# Patient Record
Sex: MALE | Race: WHITE | NOT HISPANIC OR LATINO | URBAN - NONMETROPOLITAN AREA
[De-identification: names, ages, dates, MRNs, and addresses within clinical notes are randomized per-mention and may not be internally consistent; named-entity substitution may affect disease eponyms.]

---

## 2021-09-16 ENCOUNTER — LAB OUTSIDE AN ENCOUNTER (OUTPATIENT)
Dept: URBAN - NONMETROPOLITAN AREA CLINIC 2 | Facility: CLINIC | Age: 71
End: 2021-09-16

## 2021-09-16 ENCOUNTER — WEB ENCOUNTER (OUTPATIENT)
Dept: URBAN - NONMETROPOLITAN AREA CLINIC 2 | Facility: CLINIC | Age: 71
End: 2021-09-16

## 2021-09-16 ENCOUNTER — TELEPHONE ENCOUNTER (OUTPATIENT)
Dept: URBAN - METROPOLITAN AREA CLINIC 92 | Facility: CLINIC | Age: 71
End: 2021-09-16

## 2021-09-16 ENCOUNTER — OFFICE VISIT (OUTPATIENT)
Dept: URBAN - NONMETROPOLITAN AREA CLINIC 2 | Facility: CLINIC | Age: 71
End: 2021-09-16
Payer: MEDICARE

## 2021-09-16 VITALS
HEART RATE: 59 BPM | HEIGHT: 73 IN | SYSTOLIC BLOOD PRESSURE: 190 MMHG | TEMPERATURE: 97.3 F | BODY MASS INDEX: 24.92 KG/M2 | DIASTOLIC BLOOD PRESSURE: 94 MMHG | WEIGHT: 188 LBS

## 2021-09-16 DIAGNOSIS — K51.018 ULCERATIVE PANCOLITIS WITH OTHER COMPLICATION: ICD-10-CM

## 2021-09-16 DIAGNOSIS — K83.01 PSC (PRIMARY SCLEROSING CHOLANGITIS): ICD-10-CM

## 2021-09-16 DIAGNOSIS — Z12.11 COLON CANCER SCREENING: ICD-10-CM

## 2021-09-16 DIAGNOSIS — R19.7 DIARRHEA, UNSPECIFIED TYPE: ICD-10-CM

## 2021-09-16 PROCEDURE — 99205 OFFICE O/P NEW HI 60 MIN: CPT | Performed by: NURSE PRACTITIONER

## 2021-09-16 PROCEDURE — 99245 OFF/OP CONSLTJ NEW/EST HI 55: CPT | Performed by: NURSE PRACTITIONER

## 2021-09-16 RX ORDER — PROPRANOLOL HYDROCHLORIDE 40 MG/1
AS DIRECTED TABLET ORAL
Status: ACTIVE | COMMUNITY

## 2021-09-16 RX ORDER — BUDESONIDE 3 MG/1
3 CAP PO DAILY CAPSULE, COATED PELLETS ORAL ONCE A DAY
Qty: 90 CAPSULE | Refills: 1 | OUTPATIENT
Start: 2021-09-16

## 2021-09-16 RX ORDER — MESALAMINE 800 MG/1
AS DIRECTED TABLET, DELAYED RELEASE ORAL
Status: ACTIVE | COMMUNITY

## 2021-09-16 NOTE — HPI-TODAY'S VISIT:
Mr. Pavon is a 71-year-old male who is referred by Dr. Justin Hinson for consultation of ulcerative colitis, PSC, and diarrhea.  A copy of this note will be sent to the referring physician.  He states all of his issues began in January 2020.  He developed a viral upper respiratory disease which was likely Covid at the time but not diagnosed.  He has had a downhill drop of his health since.  Over the course of the following months he develops gastrointestinal distress.  He underwent cholecystectomy at [facility] in August 2020 with no relief.  He then had a syncopal episode later that fall and was found to be profoundly anemic.  He had an EGD and colonoscopy by Dr. Becerril and was diagnosed with ulcerative colitis in November 2020.  He was treated with steroids, mesalamine, and Humira.  He has been on and off of prednisone since.  He states so long as he is on prednisone he has 1-3 formed bowel movements daily, however anytime he comes off he goes up to 30 bowel movements daily.  His last flare started 6 to 8 weeks ago, he called Dr. Becerril's office and was placed on 20 mg of prednisone with a slow taper.  He is down to 10 mg daily and having 3-4 urgent bowel movements with some incontinence.  He does report mucus but no bleeding, he does occasionally have nocturnal diarrhea.  This spring he continued to have elevated liver enzymes, he underwent an evaluation for questionable IPMN versus PSC.  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  He was placed on azathioprine along with ursodiol.  Since this summer he continues to struggle with his GI complaints.  He is on Florastor daily.  He is taking mesalamine 2 capsules 3 times daily.  He is on Humira every other week.  He has seen no improvement in his symptoms since starting azathioprine this spring.  Unfortunately we have none of Dr. Becerril's records including his EGD, colonoscopy, pathology, liver biopsy, or imaging.  He is also undergoing neurologic work-up for tremor, however looking back this all seemed to start post viral, and has worsened with steroids.  His blood sugars have been labile along with his blood pressure.  Today he is not doing well and is here for a second opinion.  MB

## 2021-09-20 ENCOUNTER — OFFICE VISIT (OUTPATIENT)
Dept: URBAN - NONMETROPOLITAN AREA SURGERY CENTER 1 | Facility: SURGERY CENTER | Age: 71
End: 2021-09-20
Payer: MEDICARE

## 2021-09-20 ENCOUNTER — CLAIMS CREATED FROM THE CLAIM WINDOW (OUTPATIENT)
Dept: URBAN - METROPOLITAN AREA CLINIC 4 | Facility: CLINIC | Age: 71
End: 2021-09-20
Payer: MEDICARE

## 2021-09-20 DIAGNOSIS — K51.919 ULCERATIVE COLITIS, UNSPECIFIED WITH UNSPECIFIED COMPLICATIONS: ICD-10-CM

## 2021-09-20 DIAGNOSIS — K51.011 CHRONIC ULCERATIVE ENTEROCOLITIS WITH RECTAL BLEEDING: ICD-10-CM

## 2021-09-20 PROCEDURE — 88305 TISSUE EXAM BY PATHOLOGIST: CPT | Performed by: PATHOLOGY

## 2021-09-20 PROCEDURE — 88341 IMHCHEM/IMCYTCHM EA ADD ANTB: CPT | Performed by: PATHOLOGY

## 2021-09-20 PROCEDURE — G8907 PT DOC NO EVENTS ON DISCHARG: HCPCS | Performed by: INTERNAL MEDICINE

## 2021-09-20 PROCEDURE — 45380 COLONOSCOPY AND BIOPSY: CPT | Performed by: INTERNAL MEDICINE

## 2021-09-20 PROCEDURE — 88342 IMHCHEM/IMCYTCHM 1ST ANTB: CPT | Performed by: PATHOLOGY

## 2021-09-20 RX ORDER — MESALAMINE 800 MG/1
AS DIRECTED TABLET, DELAYED RELEASE ORAL
Status: ACTIVE | COMMUNITY

## 2021-09-20 RX ORDER — PROPRANOLOL HYDROCHLORIDE 40 MG/1
AS DIRECTED TABLET ORAL
Status: ACTIVE | COMMUNITY

## 2021-09-20 RX ORDER — BUDESONIDE 3 MG/1
3 CAP PO DAILY CAPSULE, COATED PELLETS ORAL ONCE A DAY
Qty: 90 CAPSULE | Refills: 1 | Status: ACTIVE | COMMUNITY
Start: 2021-09-16

## 2021-09-25 LAB
A/G RATIO: 1.2
ADALIMUMAB DRUG LEVEL: 2.1
ALBUMIN: 3.7
ALKALINE PHOSPHATASE: 238
ALT (SGPT): 40
ANTI-ADALIMUMAB ANTIBODY: 31
AST (SGOT): 30
BASO (ABSOLUTE): 0
BASOS: 1
BILIRUBIN, TOTAL: 0.4
BUN/CREATININE RATIO: 15
BUN: 17
CALCIUM: 9.3
CARBON DIOXIDE, TOTAL: 25
CHLORIDE: 103
CREATININE: 1.1
EGFR IF AFRICN AM: 78
EGFR IF NONAFRICN AM: 67
EOS (ABSOLUTE): 0.1
EOS: 1
GLOBULIN, TOTAL: 3.1
GLUCOSE: 64
HBSAG SCREEN: NEGATIVE
HEMATOCRIT: 41.5
HEMATOLOGY COMMENTS:: (no result)
HEMOGLOBIN: 13.4
HEP A AB, IGM: NEGATIVE
HEP B CORE AB, IGM: NEGATIVE
HEP C VIRUS AB: <0.1
IMMATURE CELLS: (no result)
IMMATURE GRANS (ABS): 0
IMMATURE GRANULOCYTES: 1
INR: 1
LYMPHS (ABSOLUTE): 1.6
LYMPHS: 18
MCH: 29.5
MCHC: 32.3
MCV: 91
MONOCYTES(ABSOLUTE): 0.8
MONOCYTES: 9
NEUTROPHILS (ABSOLUTE): 6.3
NEUTROPHILS: 70
NRBC: (no result)
PLATELETS: 307
POTASSIUM: 3.8
PROTEIN, TOTAL: 6.8
PROTHROMBIN TIME: 10.6
QUANTIFERON CRITERIA: (no result)
QUANTIFERON INCUBATION: (no result)
QUANTIFERON MITOGEN VALUE: 6.92
QUANTIFERON NIL VALUE: 0.04
QUANTIFERON TB1 AG VALUE: 0.08
QUANTIFERON TB2 AG VALUE: 0.09
QUANTIFERON-TB GOLD PLUS: NEGATIVE
RBC: 4.54
RDW: 13.1
SODIUM: 143
WBC: 8.8

## 2021-09-27 ENCOUNTER — TELEPHONE ENCOUNTER (OUTPATIENT)
Dept: URBAN - METROPOLITAN AREA CLINIC 92 | Facility: CLINIC | Age: 71
End: 2021-09-27

## 2021-09-27 RX ORDER — DIPHENHYDRAMINE HCL 2 %
1 CAPSULE AT BEDTIME AS NEEDED CREAM (GRAM) TOPICAL ONCE A DAY
Qty: 30 | Refills: 0 | OUTPATIENT
Start: 2021-09-28 | End: 2021-10-28

## 2021-09-27 RX ORDER — BUDESONIDE 9 MG/1
1 TABLET IN THE MORNING TABLET, EXTENDED RELEASE ORAL ONCE A DAY
Qty: 30 TABLET | Refills: 1 | OUTPATIENT
Start: 2021-09-28 | End: 2021-11-26

## 2021-09-27 RX ORDER — INFLIXIMAB 100 MG/10ML
AS DIRECTED INJECTION, POWDER, LYOPHILIZED, FOR SOLUTION INTRAVENOUS
Qty: 100 MILLIGRAMS | Refills: 0 | OUTPATIENT
Start: 2021-09-28 | End: 2021-10-28

## 2021-09-27 RX ORDER — ACETAMINOPHEN 650 MG
2 TABLETS AS NEEDED TABLET, EXTENDED RELEASE ORAL
Qty: 6 TABLET | Refills: 0 | OUTPATIENT
Start: 2021-09-28 | End: 2021-09-29

## 2021-10-13 ENCOUNTER — WEB ENCOUNTER (OUTPATIENT)
Dept: URBAN - NONMETROPOLITAN AREA CLINIC 2 | Facility: CLINIC | Age: 71
End: 2021-10-13

## 2021-10-13 ENCOUNTER — TELEPHONE ENCOUNTER (OUTPATIENT)
Dept: URBAN - NONMETROPOLITAN AREA CLINIC 2 | Facility: CLINIC | Age: 71
End: 2021-10-13

## 2021-10-20 ENCOUNTER — TELEPHONE ENCOUNTER (OUTPATIENT)
Dept: URBAN - METROPOLITAN AREA CLINIC 92 | Facility: CLINIC | Age: 71
End: 2021-10-20

## 2021-10-27 ENCOUNTER — OFFICE VISIT (OUTPATIENT)
Dept: URBAN - NONMETROPOLITAN AREA CLINIC 1 | Facility: CLINIC | Age: 71
End: 2021-10-27
Payer: MEDICARE

## 2021-10-27 DIAGNOSIS — K51.00 ULCERATIVE PANCOLITIS: ICD-10-CM

## 2021-10-27 PROCEDURE — 96415 CHEMO IV INFUSION ADDL HR: CPT | Performed by: INTERNAL MEDICINE

## 2021-10-27 PROCEDURE — 96413 CHEMO IV INFUSION 1 HR: CPT | Performed by: INTERNAL MEDICINE

## 2021-10-27 RX ORDER — DIPHENHYDRAMINE HCL 2 %
1 CAPSULE AT BEDTIME AS NEEDED CREAM (GRAM) TOPICAL ONCE A DAY
Qty: 30 | Refills: 0 | Status: ACTIVE | COMMUNITY
Start: 2021-09-28 | End: 2021-10-28

## 2021-10-27 RX ORDER — MESALAMINE 800 MG/1
AS DIRECTED TABLET, DELAYED RELEASE ORAL
Status: ACTIVE | COMMUNITY

## 2021-10-27 RX ORDER — BUDESONIDE 9 MG/1
1 TABLET IN THE MORNING TABLET, EXTENDED RELEASE ORAL ONCE A DAY
Qty: 30 TABLET | Refills: 1 | Status: ACTIVE | COMMUNITY
Start: 2021-09-28 | End: 2021-11-26

## 2021-10-27 RX ORDER — BUDESONIDE 3 MG/1
3 CAP PO DAILY CAPSULE, COATED PELLETS ORAL ONCE A DAY
Qty: 90 CAPSULE | Refills: 1 | Status: ACTIVE | COMMUNITY
Start: 2021-09-16

## 2021-10-27 RX ORDER — PROPRANOLOL HYDROCHLORIDE 40 MG/1
AS DIRECTED TABLET ORAL
Status: ACTIVE | COMMUNITY

## 2021-10-27 RX ORDER — INFLIXIMAB 100 MG/10ML
AS DIRECTED INJECTION, POWDER, LYOPHILIZED, FOR SOLUTION INTRAVENOUS
Qty: 100 MILLIGRAMS | Refills: 0 | Status: ACTIVE | COMMUNITY
Start: 2021-09-28 | End: 2021-10-28

## 2021-11-05 ENCOUNTER — OFFICE VISIT (OUTPATIENT)
Dept: URBAN - NONMETROPOLITAN AREA CLINIC 13 | Facility: CLINIC | Age: 71
End: 2021-11-05

## 2021-11-09 ENCOUNTER — OFFICE VISIT (OUTPATIENT)
Dept: URBAN - NONMETROPOLITAN AREA CLINIC 1 | Facility: CLINIC | Age: 71
End: 2021-11-09
Payer: MEDICARE

## 2021-11-09 DIAGNOSIS — K51.00 ULCERATIVE PAN COLITIS: ICD-10-CM

## 2021-11-09 PROCEDURE — 96415 CHEMO IV INFUSION ADDL HR: CPT | Performed by: INTERNAL MEDICINE

## 2021-11-09 PROCEDURE — 96413 CHEMO IV INFUSION 1 HR: CPT | Performed by: INTERNAL MEDICINE

## 2021-11-09 RX ORDER — BUDESONIDE 3 MG/1
3 CAP PO DAILY CAPSULE, COATED PELLETS ORAL ONCE A DAY
Qty: 90 CAPSULE | Refills: 1 | Status: ACTIVE | COMMUNITY
Start: 2021-09-16

## 2021-11-09 RX ORDER — MESALAMINE 800 MG/1
AS DIRECTED TABLET, DELAYED RELEASE ORAL
Status: ACTIVE | COMMUNITY

## 2021-11-09 RX ORDER — BUDESONIDE 9 MG/1
1 TABLET IN THE MORNING TABLET, EXTENDED RELEASE ORAL ONCE A DAY
Qty: 30 TABLET | Refills: 1 | Status: ACTIVE | COMMUNITY
Start: 2021-09-28 | End: 2021-11-26

## 2021-11-09 RX ORDER — PROPRANOLOL HYDROCHLORIDE 40 MG/1
AS DIRECTED TABLET ORAL
Status: ACTIVE | COMMUNITY

## 2021-12-03 ENCOUNTER — TELEPHONE ENCOUNTER (OUTPATIENT)
Dept: URBAN - NONMETROPOLITAN AREA CLINIC 2 | Facility: CLINIC | Age: 71
End: 2021-12-03

## 2021-12-03 RX ORDER — PREDNISONE 10 MG/1
4 TAB PO QD X 5 D, 3 TAB PO QD X 5 DAYS, 2 TAB PO QD X  5 D, 1 TAB PO QD X 5 DAYS, STOP TABLET ORAL AS DIRECTED
Qty: 50 TABLET | Refills: 0 | OUTPATIENT
Start: 2021-12-03 | End: 2021-12-28

## 2021-12-03 RX ORDER — MESALAMINE 800 MG/1
AS DIRECTED TABLET, DELAYED RELEASE ORAL
Status: ACTIVE | COMMUNITY

## 2021-12-03 RX ORDER — PROPRANOLOL HYDROCHLORIDE 40 MG/1
AS DIRECTED TABLET ORAL
Status: ACTIVE | COMMUNITY

## 2021-12-03 RX ORDER — BUDESONIDE 3 MG/1
3 CAP PO DAILY CAPSULE, COATED PELLETS ORAL ONCE A DAY
Qty: 90 CAPSULE | Refills: 1 | Status: ACTIVE | COMMUNITY
Start: 2021-09-16

## 2021-12-06 ENCOUNTER — TELEPHONE ENCOUNTER (OUTPATIENT)
Dept: URBAN - NONMETROPOLITAN AREA CLINIC 2 | Facility: CLINIC | Age: 71
End: 2021-12-06

## 2021-12-06 ENCOUNTER — WEB ENCOUNTER (OUTPATIENT)
Dept: URBAN - NONMETROPOLITAN AREA CLINIC 2 | Facility: CLINIC | Age: 71
End: 2021-12-06

## 2021-12-06 RX ORDER — PREDNISONE 10 MG/1
4 TAB PO QD X 5 D, 3 TAB PO QD X 5 DAYS, 2 TAB PO QD X  5 D, 1 TAB PO QD X 5 DAYS, STOP TABLET ORAL AS DIRECTED
Qty: 50 TABLET | Refills: 0 | Status: ACTIVE | COMMUNITY
Start: 2021-12-03 | End: 2021-12-28

## 2021-12-06 RX ORDER — MESALAMINE 800 MG/1
AS DIRECTED TABLET, DELAYED RELEASE ORAL
Status: ACTIVE | COMMUNITY

## 2021-12-06 RX ORDER — BUDESONIDE 3 MG/1
3 CAP PO DAILY CAPSULE, COATED PELLETS ORAL ONCE A DAY
Qty: 90 CAPSULE | Refills: 1 | Status: ACTIVE | COMMUNITY
Start: 2021-09-16

## 2021-12-06 RX ORDER — PROPRANOLOL HYDROCHLORIDE 40 MG/1
AS DIRECTED TABLET ORAL
Status: ACTIVE | COMMUNITY

## 2021-12-06 RX ORDER — VANCOMYCIN HYDROCHLORIDE 125 MG/1
AS DIRECTED CAPSULE ORAL QID
Qty: 56 CAPSULE | Refills: 0 | OUTPATIENT
Start: 2021-12-06 | End: 2021-12-20

## 2021-12-09 ENCOUNTER — OFFICE VISIT (OUTPATIENT)
Dept: URBAN - NONMETROPOLITAN AREA CLINIC 2 | Facility: CLINIC | Age: 71
End: 2021-12-09

## 2021-12-09 RX ORDER — PROPRANOLOL HYDROCHLORIDE 40 MG/1
AS DIRECTED TABLET ORAL
Status: ACTIVE | COMMUNITY

## 2021-12-09 RX ORDER — MESALAMINE 800 MG/1
AS DIRECTED TABLET, DELAYED RELEASE ORAL
Status: ACTIVE | COMMUNITY

## 2021-12-09 RX ORDER — PREDNISONE 10 MG/1
4 TAB PO QD X 5 D, 3 TAB PO QD X 5 DAYS, 2 TAB PO QD X  5 D, 1 TAB PO QD X 5 DAYS, STOP TABLET ORAL AS DIRECTED
Qty: 50 TABLET | Refills: 0 | Status: ACTIVE | COMMUNITY
Start: 2021-12-03 | End: 2021-12-28

## 2021-12-09 RX ORDER — BUDESONIDE 3 MG/1
3 CAP PO DAILY CAPSULE, COATED PELLETS ORAL ONCE A DAY
Qty: 90 CAPSULE | Refills: 1 | Status: ACTIVE | COMMUNITY
Start: 2021-09-16

## 2021-12-09 RX ORDER — VANCOMYCIN HYDROCHLORIDE 125 MG/1
AS DIRECTED CAPSULE ORAL QID
Qty: 56 CAPSULE | Refills: 0 | Status: ACTIVE | COMMUNITY
Start: 2021-12-06 | End: 2021-12-20

## 2021-12-16 ENCOUNTER — WEB ENCOUNTER (OUTPATIENT)
Dept: URBAN - NONMETROPOLITAN AREA CLINIC 2 | Facility: CLINIC | Age: 71
End: 2021-12-16

## 2021-12-16 RX ORDER — COLESTIPOL HYDROCHLORIDE 1 G/1
1 PO BID TABLET, FILM COATED ORAL BID
Qty: 180 TABLET | Refills: 3 | OUTPATIENT
Start: 2021-12-16

## 2021-12-16 RX ORDER — PROPRANOLOL HYDROCHLORIDE 40 MG/1
AS DIRECTED TABLET ORAL
Status: ACTIVE | COMMUNITY

## 2021-12-16 RX ORDER — MESALAMINE 800 MG/1
AS DIRECTED TABLET, DELAYED RELEASE ORAL
Status: ACTIVE | COMMUNITY

## 2021-12-16 RX ORDER — BUDESONIDE 3 MG/1
3 CAP PO DAILY CAPSULE, COATED PELLETS ORAL ONCE A DAY
Qty: 90 CAPSULE | Refills: 1 | Status: ACTIVE | COMMUNITY
Start: 2021-09-16

## 2021-12-16 RX ORDER — VANCOMYCIN HYDROCHLORIDE 125 MG/1
AS DIRECTED CAPSULE ORAL QID
Qty: 56 CAPSULE | Refills: 0 | Status: ACTIVE | COMMUNITY
Start: 2021-12-06 | End: 2021-12-20

## 2021-12-16 RX ORDER — PREDNISONE 10 MG/1
4 TAB PO QD X 5 D, 3 TAB PO QD X 5 DAYS, 2 TAB PO QD X  5 D, 1 TAB PO QD X 5 DAYS, STOP TABLET ORAL AS DIRECTED
Qty: 50 TABLET | Refills: 0 | Status: ACTIVE | COMMUNITY
Start: 2021-12-03 | End: 2021-12-28

## 2021-12-21 ENCOUNTER — OFFICE VISIT (OUTPATIENT)
Dept: URBAN - NONMETROPOLITAN AREA CLINIC 1 | Facility: CLINIC | Age: 71
End: 2021-12-21
Payer: MEDICARE

## 2021-12-21 DIAGNOSIS — K51.80 CHRONIC PANCOLONIC ULCERATIVE COLITIS: ICD-10-CM

## 2021-12-21 PROCEDURE — 96415 CHEMO IV INFUSION ADDL HR: CPT | Performed by: INTERNAL MEDICINE

## 2021-12-21 PROCEDURE — 96413 CHEMO IV INFUSION 1 HR: CPT | Performed by: INTERNAL MEDICINE

## 2021-12-21 RX ORDER — COLESTIPOL HYDROCHLORIDE 1 G/1
1 PO BID TABLET, FILM COATED ORAL BID
Qty: 180 TABLET | Refills: 3 | Status: ACTIVE | COMMUNITY
Start: 2021-12-16

## 2021-12-21 RX ORDER — BUDESONIDE 3 MG/1
3 CAP PO DAILY CAPSULE, COATED PELLETS ORAL ONCE A DAY
Qty: 90 CAPSULE | Refills: 1 | Status: ACTIVE | COMMUNITY
Start: 2021-09-16

## 2021-12-21 RX ORDER — PROPRANOLOL HYDROCHLORIDE 40 MG/1
AS DIRECTED TABLET ORAL
Status: ACTIVE | COMMUNITY

## 2021-12-21 RX ORDER — PREDNISONE 10 MG/1
4 TAB PO QD X 5 D, 3 TAB PO QD X 5 DAYS, 2 TAB PO QD X  5 D, 1 TAB PO QD X 5 DAYS, STOP TABLET ORAL AS DIRECTED
Qty: 50 TABLET | Refills: 0 | Status: ACTIVE | COMMUNITY
Start: 2021-12-03 | End: 2021-12-28

## 2021-12-21 RX ORDER — MESALAMINE 800 MG/1
AS DIRECTED TABLET, DELAYED RELEASE ORAL
Status: ACTIVE | COMMUNITY

## 2021-12-23 ENCOUNTER — WEB ENCOUNTER (OUTPATIENT)
Dept: URBAN - NONMETROPOLITAN AREA CLINIC 2 | Facility: CLINIC | Age: 71
End: 2021-12-23

## 2021-12-27 ENCOUNTER — WEB ENCOUNTER (OUTPATIENT)
Dept: URBAN - NONMETROPOLITAN AREA CLINIC 2 | Facility: CLINIC | Age: 71
End: 2021-12-27

## 2021-12-29 ENCOUNTER — OUT OF OFFICE VISIT (OUTPATIENT)
Dept: URBAN - METROPOLITAN AREA MEDICAL CENTER 1 | Facility: MEDICAL CENTER | Age: 71
End: 2021-12-29
Payer: MEDICARE

## 2021-12-29 DIAGNOSIS — K51.80 CHRONIC PANCOLONIC ULCERATIVE COLITIS: ICD-10-CM

## 2021-12-29 DIAGNOSIS — A04.71 CLOSTRIDIUM DIFFICILE COLITIS: ICD-10-CM

## 2021-12-29 DIAGNOSIS — R19.7 ACUTE DIARRHEA: ICD-10-CM

## 2021-12-29 PROCEDURE — G8427 DOCREV CUR MEDS BY ELIG CLIN: HCPCS | Performed by: INTERNAL MEDICINE

## 2021-12-29 PROCEDURE — 99222 1ST HOSP IP/OBS MODERATE 55: CPT | Performed by: INTERNAL MEDICINE

## 2021-12-30 ENCOUNTER — OUT OF OFFICE VISIT (OUTPATIENT)
Dept: URBAN - METROPOLITAN AREA MEDICAL CENTER 1 | Facility: MEDICAL CENTER | Age: 71
End: 2021-12-30
Payer: MEDICARE

## 2021-12-30 DIAGNOSIS — R19.7 ACUTE DIARRHEA: ICD-10-CM

## 2021-12-30 PROCEDURE — 45330 DIAGNOSTIC SIGMOIDOSCOPY: CPT | Performed by: INTERNAL MEDICINE

## 2022-01-10 ENCOUNTER — OFFICE VISIT (OUTPATIENT)
Dept: URBAN - NONMETROPOLITAN AREA CLINIC 2 | Facility: CLINIC | Age: 72
End: 2022-01-10

## 2022-01-10 ENCOUNTER — WEB ENCOUNTER (OUTPATIENT)
Dept: URBAN - NONMETROPOLITAN AREA CLINIC 2 | Facility: CLINIC | Age: 72
End: 2022-01-10

## 2022-01-10 RX ORDER — MESALAMINE 800 MG/1
AS DIRECTED TABLET, DELAYED RELEASE ORAL
Status: ACTIVE | COMMUNITY

## 2022-01-10 RX ORDER — PROPRANOLOL HYDROCHLORIDE 40 MG/1
AS DIRECTED TABLET ORAL
Status: ACTIVE | COMMUNITY

## 2022-01-10 RX ORDER — BUDESONIDE 3 MG/1
3 CAP PO DAILY CAPSULE, COATED PELLETS ORAL ONCE A DAY
Qty: 90 CAPSULE | Refills: 1 | Status: ACTIVE | COMMUNITY
Start: 2021-09-16

## 2022-01-10 RX ORDER — COLESTIPOL HYDROCHLORIDE 1 G/1
1 PO BID TABLET, FILM COATED ORAL BID
Qty: 180 TABLET | Refills: 3 | Status: ACTIVE | COMMUNITY
Start: 2021-12-16

## 2022-01-11 ENCOUNTER — OFFICE VISIT (OUTPATIENT)
Dept: URBAN - METROPOLITAN AREA TELEHEALTH 2 | Facility: TELEHEALTH | Age: 72
End: 2022-01-11
Payer: MEDICARE

## 2022-01-11 ENCOUNTER — WEB ENCOUNTER (OUTPATIENT)
Dept: URBAN - NONMETROPOLITAN AREA CLINIC 2 | Facility: CLINIC | Age: 72
End: 2022-01-11

## 2022-01-11 DIAGNOSIS — K51.018 ULCERATIVE PANCOLITIS WITH OTHER COMPLICATION: ICD-10-CM

## 2022-01-11 DIAGNOSIS — R19.7 DIARRHEA, UNSPECIFIED TYPE: ICD-10-CM

## 2022-01-11 DIAGNOSIS — Z12.11 COLON CANCER SCREENING: ICD-10-CM

## 2022-01-11 DIAGNOSIS — K21.9 GASTROESOPHAGEAL REFLUX DISEASE, UNSPECIFIED WHETHER ESOPHAGITIS PRESENT: ICD-10-CM

## 2022-01-11 DIAGNOSIS — K83.01 PSC (PRIMARY SCLEROSING CHOLANGITIS): ICD-10-CM

## 2022-01-11 PROCEDURE — 99214 OFFICE O/P EST MOD 30 MIN: CPT | Performed by: NURSE PRACTITIONER

## 2022-01-11 RX ORDER — PROPRANOLOL HYDROCHLORIDE 40 MG/1
AS DIRECTED TABLET ORAL
Status: ACTIVE | COMMUNITY

## 2022-01-11 RX ORDER — MESALAMINE 800 MG/1
AS DIRECTED TABLET, DELAYED RELEASE ORAL
Status: ACTIVE | COMMUNITY

## 2022-01-11 RX ORDER — FAMOTIDINE 20 MG/1
TAKE 1 TABLET BY MOUTH TWICE A DAY TABLET ORAL TWICE A DAY
Qty: 180 TABLET | Refills: 3 | OUTPATIENT
Start: 2022-01-11

## 2022-01-11 RX ORDER — SODIUM CHLORIDE 0.9 % (FLUSH) 0.9 %
1,000ML OVER 2 HOURS SYRINGE (ML) INJECTION
Qty: 1 INTRAVENOUS BAG | Refills: 0 | OUTPATIENT
Start: 2022-01-11 | End: 2022-01-12

## 2022-01-11 RX ORDER — BUDESONIDE 3 MG/1
3 CAP PO DAILY CAPSULE, COATED PELLETS ORAL ONCE A DAY
Qty: 90 CAPSULE | Refills: 1 | Status: ACTIVE | COMMUNITY
Start: 2021-09-16

## 2022-01-11 RX ORDER — COLESTIPOL HYDROCHLORIDE 1 G/1
1 PO BID TABLET, FILM COATED ORAL BID
Qty: 180 TABLET | Refills: 3 | Status: ACTIVE | COMMUNITY
Start: 2021-12-16

## 2022-01-11 NOTE — HPI-TODAY'S VISIT:
Mr. Pavon is a 71-year-old male who is referred by Dr. Justin Hinson for consultation of ulcerative colitis, PSC, and diarrhea.  A copy of this note will be sent to the referring physician.  He states all of his issues began in January 2020.  He developed a viral upper respiratory disease which was likely Covid at the time but not diagnosed.  He has had a downhill drop of his health since.  Over the course of the following months he develops gastrointestinal distress.  He underwent cholecystectomy at [facility] in August 2020 with no relief.  He then had a syncopal episode later that fall and was found to be profoundly anemic.  He had an EGD and colonoscopy by Dr. Becerril and was diagnosed with ulcerative colitis in November 2020.  He was treated with steroids, mesalamine, and Humira.  He has been on and off of prednisone since.  He states so long as he is on prednisone he has 1-3 formed bowel movements daily, however anytime he comes off he goes up to 30 bowel movements daily.  His last flare started 6 to 8 weeks ago, he called Dr. Becerril's office and was placed on 20 mg of prednisone with a slow taper.  He is down to 10 mg daily and having 3-4 urgent bowel movements with some incontinence.  He does report mucus but no bleeding, he does occasionally have nocturnal diarrhea.  This spring he continued to have elevated liver enzymes, he underwent an evaluation for questionable IPMN versus PSC.  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  He was placed on azathioprine along with ursodiol.  Since this summer he continues to struggle with his GI complaints.  He is on Florastor daily.  He is taking mesalamine 2 capsules 3 times daily.  He is on Humira every other week.  He has seen no improvement in his symptoms since starting azathioprine this spring.  Unfortunately we have none of Dr. Becerril's records including his EGD, colonoscopy, pathology, liver biopsy, or imaging.  He is also undergoing neurologic work-up for tremor, however looking back this all seemed to start post viral, and has worsened with steroids.  His blood sugars have been labile along with his blood pressure.  Today he is not doing well and is here for a second opinion.  MB   1/11/2022 Bony presents for follow-up of ulcerative colitis, questionable history of primary sclerosing cholangitis, and C. difficile. Since his last visit he underwent colonoscopy in September by Dr. Willis with moderate pancolitis. He was transitioned off Humira and started on steroids and Inflectra. After receiving 2 of his induction doses he developed progressive diarrhea. He was tested and found to be C. difficile positive. He completed a course of vancomycin with recurrent symptoms and treated twice. He underwent his third infusion with a flare of severe diarrhea in the third week in December. He was admitted and found to be toxin negative but antigen and PCR positive. He underwent flexible sigmoidoscopy with no significant pseudomembranous colitis. His steroids were discontinued and he was placed on a vancomycin taper by Dr. Carrillo. Today his diarrhea is slowing down. Yesterday he states he had around 15 loose bowel movements. Today he has only had approximately 6. He is not taking Florastor. He has been treated with colestipol in the past but is not taking this recently. I was able to review his liver biopsy ordered by Dr. Becerril, it is unclear that he has PSC from his liver biopsy. He does agree to a second opinion with hepatology at [place]. 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 He does agree to pursue Dificid plus or minus monoclonal antibody infusion if he has recurrence. He does agree to see infectious disease. We had a long discussion regarding dehydration. He does agree to fluids and repeat blood work this week. He understands that if he develops severe diarrhea abdominal cramping fever or distention that he is to proceed back to the emergency department. MB

## 2022-01-13 ENCOUNTER — WEB ENCOUNTER (OUTPATIENT)
Dept: URBAN - NONMETROPOLITAN AREA CLINIC 2 | Facility: CLINIC | Age: 72
End: 2022-01-13

## 2022-01-19 ENCOUNTER — OFFICE VISIT (OUTPATIENT)
Dept: URBAN - METROPOLITAN AREA TELEHEALTH 2 | Facility: TELEHEALTH | Age: 72
End: 2022-01-19

## 2022-01-24 ENCOUNTER — WEB ENCOUNTER (OUTPATIENT)
Dept: URBAN - NONMETROPOLITAN AREA CLINIC 2 | Facility: CLINIC | Age: 72
End: 2022-01-24

## 2022-01-24 ENCOUNTER — TELEPHONE ENCOUNTER (OUTPATIENT)
Dept: URBAN - NONMETROPOLITAN AREA CLINIC 2 | Facility: CLINIC | Age: 72
End: 2022-01-24

## 2022-01-25 ENCOUNTER — OFFICE VISIT (OUTPATIENT)
Dept: URBAN - NONMETROPOLITAN AREA CLINIC 2 | Facility: CLINIC | Age: 72
End: 2022-01-25
Payer: MEDICARE

## 2022-01-25 DIAGNOSIS — K21.9 GASTROESOPHAGEAL REFLUX DISEASE, UNSPECIFIED WHETHER ESOPHAGITIS PRESENT: ICD-10-CM

## 2022-01-25 DIAGNOSIS — A04.72 CLOSTRIDIUM DIFFICILE DIARRHEA: ICD-10-CM

## 2022-01-25 DIAGNOSIS — R19.7 DIARRHEA, UNSPECIFIED TYPE: ICD-10-CM

## 2022-01-25 DIAGNOSIS — Z12.11 COLON CANCER SCREENING: ICD-10-CM

## 2022-01-25 DIAGNOSIS — K51.018 ULCERATIVE PANCOLITIS WITH OTHER COMPLICATION: ICD-10-CM

## 2022-01-25 DIAGNOSIS — K83.01 PSC (PRIMARY SCLEROSING CHOLANGITIS): ICD-10-CM

## 2022-01-25 PROCEDURE — 99214 OFFICE O/P EST MOD 30 MIN: CPT | Performed by: INTERNAL MEDICINE

## 2022-01-25 RX ORDER — PROPRANOLOL HYDROCHLORIDE 40 MG/1
AS DIRECTED TABLET ORAL
Status: ACTIVE | COMMUNITY

## 2022-01-25 RX ORDER — FAMOTIDINE 20 MG/1
TAKE 1 TABLET BY MOUTH TWICE A DAY TABLET ORAL TWICE A DAY
Qty: 180 TABLET | Refills: 3 | Status: ACTIVE | COMMUNITY
Start: 2022-01-11

## 2022-01-25 RX ORDER — COLESTIPOL HYDROCHLORIDE 1 G/1
1 PO BID TABLET, FILM COATED ORAL BID
Qty: 180 TABLET | Refills: 3 | Status: ACTIVE | COMMUNITY
Start: 2021-12-16

## 2022-01-25 RX ORDER — FIDAXOMICIN 200 MG/1
1 TABLET TABLET, FILM COATED ORAL TWICE A DAY
Qty: 20 | OUTPATIENT
Start: 2022-01-25 | End: 2022-02-04

## 2022-01-25 RX ORDER — BUDESONIDE 3 MG/1
3 CAP PO DAILY CAPSULE, COATED PELLETS ORAL ONCE A DAY
Qty: 90 CAPSULE | Refills: 1 | Status: ACTIVE | COMMUNITY
Start: 2021-09-16

## 2022-01-25 RX ORDER — MESALAMINE 800 MG/1
AS DIRECTED TABLET, DELAYED RELEASE ORAL
Status: ACTIVE | COMMUNITY

## 2022-01-25 RX ORDER — VANCOMYCIN HYDROCHLORIDE 125 MG/1
AS DIRECTED CAPSULE ORAL
Qty: 28 CAPSULE | Refills: 1 | OUTPATIENT
Start: 2022-01-25 | End: 2022-02-22

## 2022-01-25 NOTE — HPI-TODAY'S VISIT:
Mr. Pavon is a 71-year-old male who is referred by Dr. Justin Hinson for consultation of ulcerative colitis, PSC, and diarrhea.  A copy of this note will be sent to the referring physician.  He states all of his issues began in January 2020.  He developed a viral upper respiratory disease which was likely Covid at the time but not diagnosed.  He has had a downhill drop of his health since.  Over the course of the following months he develops gastrointestinal distress.  He underwent cholecystectomy at [place] in August 2020 with no relief.  He then had a syncopal episode later that fall and was found to be profoundly anemic.  He had an EGD and colonoscopy by Dr. Becerril and was diagnosed with ulcerative colitis in November 2020.  He was treated with steroids, mesalamine, and Humira.  He has been on and off of prednisone since.  He states so long as he is on prednisone he has 1-3 formed bowel movements daily, however anytime he comes off he goes up to 30 bowel movements daily.  His last flare started 6 to 8 weeks ago, he called Dr. Becerril's office and was placed on 20 mg of prednisone with a slow taper.  He is down to 10 mg daily and having 3-4 urgent bowel movements with some incontinence.  He does report mucus but no bleeding, he does occasionally have nocturnal diarrhea.  This spring he continued to have elevated liver enzymes, he underwent an evaluation for questionable IPMN versus PSC.  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  He was placed on azathioprine along with ursodiol.  Since this summer he continues to struggle with his GI complaints.  He is on Florastor daily.  He is taking mesalamine 2 capsules 3 times daily.  He is on Humira every other week.  He has seen no improvement in his symptoms since starting azathioprine this spring.  Unfortunately we have none of Dr. Becerril's records including his EGD, colonoscopy, pathology, liver biopsy, or imaging.  He is also undergoing neurologic work-up for tremor, however looking back this all seemed to start post viral, and has worsened with steroids.  His blood sugars have been labile along with his blood pressure.  Today he is not doing well and is here for a second opinion.  MB   1/11/2022 Bony presents for follow-up of ulcerative colitis, questionable history of primary sclerosing cholangitis, and C. difficile. Since his last visit he underwent colonoscopy in September by Dr. Willis with moderate pancolitis. He was transitioned off Humira and started on steroids and Inflectra. After receiving 2 of his induction doses he developed progressive diarrhea. He was tested and found to be C. difficile positive. He completed a course of vancomycin with recurrent symptoms and treated twice. He underwent his third infusion with a flare of severe diarrhea in the third week in December. He was admitted and found to be toxin negative but antigen and PCR positive. He underwent flexible sigmoidoscopy with no significant pseudomembranous colitis. His steroids were discontinued and he was placed on a vancomycin taper by Dr. Carrillo. Today his diarrhea is slowing down. Yesterday he states he had around 15 loose bowel movements. Today he has only had approximately 6. He is not taking Florastor. He has been treated with colestipol in the past but is not taking this recently. I was able to review his liver biopsy ordered by Dr. Becerril, it is unclear that he has PSC from his liver biopsy. He does agree to a second opinion with hepatology at [place]. 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 He does agree to pursue Dificid plus or minus monoclonal antibody infusion if he has recurrence. He does agree to see infectious disease. We had a long discussion regarding dehydration. He does agree to fluids and repeat blood work this week. He understands that if he develops severe diarrhea abdominal cramping fever or distention that he is to proceed back to the emergency department. MB 1/25/2022 The patient presents today for follow-up of his ulcerative colitis and C. difficile with severe diarrhea.  Since our last visit, he has not been doing well.  He has been readmitted to the hospital secondary to diarrhea.  He was found to have severe C. difficile colitis.  He is having 10-20 bowel movements daily.  He denies any blood in his stool.  He denies any abdominal pain.  On flexible sigmoidoscopy, this was felt to be secondary to C. difficile.  He is tapering down his vancomycin, and he is taking colestipol twice a day.  We have discussed starting on cholestyramine 4 times a day and changing him to Dificid.  He does have a follow-up with infectious disease.  We are also going to check his labs today to make sure he is not dehydrated, and to make sure he does not need to be admitted for IV fluids.  At this point, we have discussed advancing his diet as tolerated.  Increasing his cholestyramine and adding Dificid.  Until the Dificid is improved, I do want him to take the vancomycin 4 times a day.  I have discussed this plan with the patient and his wife, and they are in agreement.

## 2022-01-26 ENCOUNTER — TELEPHONE ENCOUNTER (OUTPATIENT)
Dept: URBAN - METROPOLITAN AREA CLINIC 92 | Facility: CLINIC | Age: 72
End: 2022-01-26

## 2022-01-26 LAB
ALBUMIN: 3.9
ALKALINE PHOSPHATASE: 199
ALT (SGPT): 28
AST (SGOT): 25
BASO (ABSOLUTE): 0.1
BASOS: 1
BILIRUBIN, DIRECT: 0.12
BILIRUBIN, TOTAL: 0.3
BUN/CREATININE RATIO: 9
BUN: 12
C-REACTIVE PROTEIN, QUANT: 6
CARBON DIOXIDE, TOTAL: 19
CHLORIDE: 102
CREATININE: 1.27
EGFR IF AFRICN AM: 65
EGFR IF NONAFRICN AM: 56
EOS (ABSOLUTE): 0.3
EOS: 4
GLUCOSE: 260
HEMATOCRIT: 45.3
HEMATOLOGY COMMENTS:: (no result)
HEMOGLOBIN: 14.2
IMMATURE CELLS: (no result)
IMMATURE GRANS (ABS): 0
IMMATURE GRANULOCYTES: 0
LYMPHS (ABSOLUTE): 1.6
LYMPHS: 18
MCH: 28.4
MCHC: 31.3
MCV: 91
MONOCYTES(ABSOLUTE): 0.7
MONOCYTES: 8
NEUTROPHILS (ABSOLUTE): 6.2
NEUTROPHILS: 69
NRBC: (no result)
PLATELETS: 322
POTASSIUM: 4.2
PROTEIN, TOTAL: 7
RBC: 5
RDW: 14.1
SEDIMENTATION RATE-WESTERGREN: 36
SODIUM: 139
WBC: 8.9

## 2022-01-31 ENCOUNTER — WEB ENCOUNTER (OUTPATIENT)
Dept: URBAN - NONMETROPOLITAN AREA CLINIC 2 | Facility: CLINIC | Age: 72
End: 2022-01-31

## 2022-01-31 ENCOUNTER — TELEPHONE ENCOUNTER (OUTPATIENT)
Dept: URBAN - METROPOLITAN AREA CLINIC 92 | Facility: CLINIC | Age: 72
End: 2022-01-31

## 2022-01-31 RX ORDER — FIDAXOMICIN 200 MG/1
1 TABLET TABLET, FILM COATED ORAL TWICE A DAY
Qty: 20
Start: 2022-01-25 | End: 2022-02-10

## 2022-02-01 ENCOUNTER — TELEPHONE ENCOUNTER (OUTPATIENT)
Dept: URBAN - NONMETROPOLITAN AREA CLINIC 2 | Facility: CLINIC | Age: 72
End: 2022-02-01

## 2022-02-02 ENCOUNTER — WEB ENCOUNTER (OUTPATIENT)
Dept: URBAN - NONMETROPOLITAN AREA CLINIC 2 | Facility: CLINIC | Age: 72
End: 2022-02-02

## 2022-03-02 ENCOUNTER — TELEPHONE ENCOUNTER (OUTPATIENT)
Dept: URBAN - METROPOLITAN AREA CLINIC 92 | Facility: CLINIC | Age: 72
End: 2022-03-02

## 2022-03-02 ENCOUNTER — OFFICE VISIT (OUTPATIENT)
Dept: URBAN - METROPOLITAN AREA TELEHEALTH 2 | Facility: TELEHEALTH | Age: 72
End: 2022-03-02
Payer: MEDICARE

## 2022-03-02 DIAGNOSIS — K51.018 ULCERATIVE PANCOLITIS WITH OTHER COMPLICATION: ICD-10-CM

## 2022-03-02 DIAGNOSIS — K83.01 PSC (PRIMARY SCLEROSING CHOLANGITIS): ICD-10-CM

## 2022-03-02 DIAGNOSIS — Z12.11 COLON CANCER SCREENING: ICD-10-CM

## 2022-03-02 DIAGNOSIS — R19.7 DIARRHEA, UNSPECIFIED TYPE: ICD-10-CM

## 2022-03-02 DIAGNOSIS — A04.72 CLOSTRIDIUM DIFFICILE DIARRHEA: ICD-10-CM

## 2022-03-02 DIAGNOSIS — K21.9 GASTROESOPHAGEAL REFLUX DISEASE, UNSPECIFIED WHETHER ESOPHAGITIS PRESENT: ICD-10-CM

## 2022-03-02 PROCEDURE — 99214 OFFICE O/P EST MOD 30 MIN: CPT | Performed by: NURSE PRACTITIONER

## 2022-03-02 RX ORDER — INFLIXIMAB 100 MG/10ML
AS DIRECTED INJECTION, POWDER, LYOPHILIZED, FOR SOLUTION INTRAVENOUS
Qty: 100 MILLIGRAMS | Refills: 0 | OUTPATIENT
Start: 2022-03-02 | End: 2022-04-01

## 2022-03-02 RX ORDER — BUDESONIDE 3 MG/1
3 CAP PO DAILY CAPSULE, COATED PELLETS ORAL ONCE A DAY
Qty: 90 CAPSULE | Refills: 1 | OUTPATIENT
Start: 2022-03-02

## 2022-03-02 RX ORDER — MESALAMINE 800 MG/1
AS DIRECTED TABLET, DELAYED RELEASE ORAL
Status: ACTIVE | COMMUNITY

## 2022-03-02 RX ORDER — DIPHENHYDRAMINE HCL 2 %
1 CAPSULE AT BEDTIME AS NEEDED CREAM (GRAM) TOPICAL ONCE A DAY
Qty: 30 | Refills: 0 | OUTPATIENT
Start: 2022-03-02 | End: 2022-04-01

## 2022-03-02 RX ORDER — FAMOTIDINE 20 MG/1
TAKE 1 TABLET BY MOUTH TWICE A DAY TABLET ORAL TWICE A DAY
Qty: 180 TABLET | Refills: 3 | Status: ACTIVE | COMMUNITY
Start: 2022-01-11

## 2022-03-02 RX ORDER — ACETAMINOPHEN 650 MG
2 TABLETS AS NEEDED TABLET, EXTENDED RELEASE ORAL
Qty: 6 TABLET | Refills: 0 | OUTPATIENT
Start: 2022-03-02 | End: 2022-03-03

## 2022-03-02 RX ORDER — PROPRANOLOL HYDROCHLORIDE 40 MG/1
AS DIRECTED TABLET ORAL
Status: ACTIVE | COMMUNITY

## 2022-03-02 NOTE — HPI-TODAY'S VISIT:
Mr. Pavon is a 71-year-old male who is referred by Dr. Justin Hinson for consultation of ulcerative colitis, PSC, and diarrhea.  A copy of this note will be sent to the referring physician.  He states all of his issues began in January 2020.  He developed a viral upper respiratory disease which was likely Covid at the time but not diagnosed.  He has had a downhill drop of his health since.  Over the course of the following months he develops gastrointestinal distress.  He underwent cholecystectomy at [place] in August 2020 with no relief.  He then had a syncopal episode later that fall and was found to be profoundly anemic.  He had an EGD and colonoscopy by Dr. Becerril and was diagnosed with ulcerative colitis in November 2020.  He was treated with steroids, mesalamine, and Humira.  He has been on and off of prednisone since.  He states so long as he is on prednisone he has 1-3 formed bowel movements daily, however anytime he comes off he goes up to 30 bowel movements daily.  His last flare started 6 to 8 weeks ago, he called Dr. Becerril's office and was placed on 20 mg of prednisone with a slow taper.  He is down to 10 mg daily and having 3-4 urgent bowel movements with some incontinence.  He does report mucus but no bleeding, he does occasionally have nocturnal diarrhea.  This spring he continued to have elevated liver enzymes, he underwent an evaluation for questionable IPMN versus PSC.  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  He was placed on azathioprine along with ursodiol.  Since this summer he continues to struggle with his GI complaints.  He is on Florastor daily.  He is taking mesalamine 2 capsules 3 times daily.  He is on Humira every other week.  He has seen no improvement in his symptoms since starting azathioprine this spring.  Unfortunately we have none of Dr. Becerril's records including his EGD, colonoscopy, pathology, liver biopsy, or imaging.  He is also undergoing neurologic work-up for tremor, however looking back this all seemed to start post viral, and has worsened with steroids.  His blood sugars have been labile along with his blood pressure.  Today he is not doing well and is here for a second opinion.  MB   1/11/2022 Bony presents for follow-up of ulcerative colitis, questionable history of primary sclerosing cholangitis, and C. difficile. Since his last visit he underwent colonoscopy in September by Dr. Willis with moderate pancolitis. He was transitioned off Humira and started on steroids and Inflectra. After receiving 2 of his induction doses he developed progressive diarrhea. He was tested and found to be C. difficile positive. He completed a course of vancomycin with recurrent symptoms and treated twice. He underwent his third infusion with a flare of severe diarrhea in the third week in December. He was admitted and found to be toxin negative but antigen and PCR positive. He underwent flexible sigmoidoscopy with no significant pseudomembranous colitis. His steroids were discontinued and he was placed on a vancomycin taper by Dr. Carrillo. Today his diarrhea is slowing down. Yesterday he states he had around 15 loose bowel movements. Today he has only had approximately 6. He is not taking Florastor. He has been treated with colestipol in the past but is not taking this recently. I was able to review his liver biopsy ordered by Dr. Becerril, it is unclear that he has PSC from his liver biopsy. He does agree to a second opinion with hepatology at [place]. 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 He does agree to pursue Dificid plus or minus monoclonal antibody infusion if he has recurrence. He does agree to see infectious disease. We had a long discussion regarding dehydration. He does agree to fluids and repeat blood work this week. He understands that if he develops severe diarrhea abdominal cramping fever or distention that he is to proceed back to the emergency department. MB 1/25/2022 The patient presents today for follow-up of his ulcerative colitis and C. difficile with severe diarrhea.  Since our last visit, he has not been doing well.  He has been readmitted to the hospital secondary to diarrhea.  He was found to have severe C. difficile colitis.  He is having 10-20 bowel movements daily.  He denies any blood in his stool.  He denies any abdominal pain.  On flexible sigmoidoscopy, this was felt to be secondary to C. difficile.  He is tapering down his vancomycin, and he is taking colestipol twice a day.  We have discussed starting on cholestyramine 4 times a day and changing him to Dificid.  He does have a follow-up with infectious disease.  We are also going to check his labs today to make sure he is not dehydrated, and to make sure he does not need to be admitted for IV fluids.  At this point, we have discussed advancing his diet as tolerated.  Increasing his cholestyramine and adding Dificid.  Until the Dificid is improved, I do want him to take the vancomycin 4 times a day.  I have discussed this plan with the patient and his wife, and they are in agreement.  3/2/2022 Bony presents for follow up of UC and c. diff. SInce his last visit he did see ID. He never went on dificid and never had the monoclonal antibody as his repeat c. diff was negative, he completed the vanc taper and had the monoclonal ab infusion. He is doing better during the day, still loose urgent stools after meals. However at night he is having a BM every 45 minutes to an hour with tensmus. His last dose of inflectra 12/21 at 5mg/kg, that completed his induction. He is currently taking orally mesalamine 800mg TID. He is not taking the cholestyramine. His diarrhea at night is the worse. He doesn't feel like this c. diff diarrhea. Today he is still struggling with diarrhea at night and is due for infliximab. MB  This televisit was performed at the patient's home.

## 2022-03-15 ENCOUNTER — WEB ENCOUNTER (OUTPATIENT)
Dept: URBAN - NONMETROPOLITAN AREA CLINIC 2 | Facility: CLINIC | Age: 72
End: 2022-03-15

## 2022-03-22 ENCOUNTER — OFFICE VISIT (OUTPATIENT)
Dept: URBAN - NONMETROPOLITAN AREA CLINIC 1 | Facility: CLINIC | Age: 72
End: 2022-03-22
Payer: MEDICARE

## 2022-03-22 DIAGNOSIS — K51.00 PANCOLITIS: ICD-10-CM

## 2022-03-22 PROCEDURE — 96365 THER/PROPH/DIAG IV INF INIT: CPT | Performed by: INTERNAL MEDICINE

## 2022-03-22 PROCEDURE — 96366 THER/PROPH/DIAG IV INF ADDON: CPT | Performed by: INTERNAL MEDICINE

## 2022-03-22 RX ORDER — DIPHENHYDRAMINE HCL 2 %
1 CAPSULE AT BEDTIME AS NEEDED CREAM (GRAM) TOPICAL ONCE A DAY
Qty: 30 | Refills: 0 | Status: ACTIVE | COMMUNITY
Start: 2022-03-02 | End: 2022-04-01

## 2022-03-22 RX ORDER — PROPRANOLOL HYDROCHLORIDE 40 MG/1
AS DIRECTED TABLET ORAL
Status: ACTIVE | COMMUNITY

## 2022-03-22 RX ORDER — FAMOTIDINE 20 MG/1
TAKE 1 TABLET BY MOUTH TWICE A DAY TABLET ORAL TWICE A DAY
Qty: 180 TABLET | Refills: 3 | Status: ACTIVE | COMMUNITY
Start: 2022-01-11

## 2022-03-22 RX ORDER — INFLIXIMAB 100 MG/10ML
AS DIRECTED INJECTION, POWDER, LYOPHILIZED, FOR SOLUTION INTRAVENOUS
Qty: 100 MILLIGRAMS | Refills: 0 | Status: ACTIVE | COMMUNITY
Start: 2022-03-02 | End: 2022-04-01

## 2022-03-22 RX ORDER — BUDESONIDE 3 MG/1
3 CAP PO DAILY CAPSULE, COATED PELLETS ORAL ONCE A DAY
Qty: 90 CAPSULE | Refills: 1 | Status: ACTIVE | COMMUNITY
Start: 2022-03-02

## 2022-03-22 RX ORDER — MESALAMINE 800 MG/1
AS DIRECTED TABLET, DELAYED RELEASE ORAL
Status: ACTIVE | COMMUNITY

## 2022-03-24 ENCOUNTER — WEB ENCOUNTER (OUTPATIENT)
Dept: URBAN - NONMETROPOLITAN AREA CLINIC 2 | Facility: CLINIC | Age: 72
End: 2022-03-24

## 2022-03-24 RX ORDER — FAMOTIDINE 40 MG/1
TAKE 1 TABLET BY MOUTH TWICE A DAY TABLET, FILM COATED ORAL TWICE A DAY
Qty: 180 TABLET | Refills: 3 | OUTPATIENT
Start: 2022-03-24

## 2022-03-24 RX ORDER — BUDESONIDE 3 MG/1
3 CAP PO DAILY CAPSULE, COATED PELLETS ORAL ONCE A DAY
Qty: 90 CAPSULE | Refills: 1 | Status: ACTIVE | COMMUNITY
Start: 2022-03-02

## 2022-03-24 RX ORDER — MESALAMINE 800 MG/1
AS DIRECTED TABLET, DELAYED RELEASE ORAL
Status: ACTIVE | COMMUNITY

## 2022-03-24 RX ORDER — FAMOTIDINE 20 MG/1
TAKE 1 TABLET BY MOUTH TWICE A DAY TABLET ORAL TWICE A DAY
Qty: 180 TABLET | Refills: 3 | Status: ACTIVE | COMMUNITY
Start: 2022-01-11

## 2022-03-24 RX ORDER — PROPRANOLOL HYDROCHLORIDE 40 MG/1
AS DIRECTED TABLET ORAL
Status: ACTIVE | COMMUNITY

## 2022-03-24 RX ORDER — INFLIXIMAB 100 MG/10ML
AS DIRECTED INJECTION, POWDER, LYOPHILIZED, FOR SOLUTION INTRAVENOUS
Qty: 100 MILLIGRAMS | Refills: 0 | Status: ACTIVE | COMMUNITY
Start: 2022-03-02 | End: 2022-04-01

## 2022-03-24 RX ORDER — DIPHENHYDRAMINE HCL 2 %
1 CAPSULE AT BEDTIME AS NEEDED CREAM (GRAM) TOPICAL ONCE A DAY
Qty: 30 | Refills: 0 | Status: ACTIVE | COMMUNITY
Start: 2022-03-02 | End: 2022-04-01

## 2022-03-25 ENCOUNTER — WEB ENCOUNTER (OUTPATIENT)
Dept: URBAN - NONMETROPOLITAN AREA CLINIC 2 | Facility: CLINIC | Age: 72
End: 2022-03-25

## 2022-03-25 RX ORDER — FAMOTIDINE 40 MG/1
TAKE 1 TABLET BY MOUTH TWICE A DAY TABLET, FILM COATED ORAL TWICE A DAY
Qty: 180 TABLET | Refills: 3
Start: 2022-03-24

## 2022-03-30 ENCOUNTER — OFFICE VISIT (OUTPATIENT)
Dept: URBAN - NONMETROPOLITAN AREA CLINIC 2 | Facility: CLINIC | Age: 72
End: 2022-03-30
Payer: MEDICARE

## 2022-03-30 VITALS
SYSTOLIC BLOOD PRESSURE: 143 MMHG | HEART RATE: 67 BPM | WEIGHT: 185 LBS | HEIGHT: 73 IN | TEMPERATURE: 97.5 F | BODY MASS INDEX: 24.52 KG/M2 | DIASTOLIC BLOOD PRESSURE: 77 MMHG

## 2022-03-30 DIAGNOSIS — K51.018 ULCERATIVE PANCOLITIS WITH OTHER COMPLICATION: ICD-10-CM

## 2022-03-30 DIAGNOSIS — K21.9 GASTROESOPHAGEAL REFLUX DISEASE, UNSPECIFIED WHETHER ESOPHAGITIS PRESENT: ICD-10-CM

## 2022-03-30 DIAGNOSIS — R19.7 DIARRHEA, UNSPECIFIED TYPE: ICD-10-CM

## 2022-03-30 DIAGNOSIS — K83.01 PSC (PRIMARY SCLEROSING CHOLANGITIS): ICD-10-CM

## 2022-03-30 DIAGNOSIS — A04.72 CLOSTRIDIUM DIFFICILE DIARRHEA: ICD-10-CM

## 2022-03-30 DIAGNOSIS — Z12.11 COLON CANCER SCREENING: ICD-10-CM

## 2022-03-30 PROCEDURE — 99214 OFFICE O/P EST MOD 30 MIN: CPT | Performed by: NURSE PRACTITIONER

## 2022-03-30 RX ORDER — DIPHENHYDRAMINE HCL 2 %
1 CAPSULE AT BEDTIME AS NEEDED CREAM (GRAM) TOPICAL ONCE A DAY
Qty: 30 | Refills: 0 | Status: ACTIVE | COMMUNITY
Start: 2022-03-02 | End: 2022-04-01

## 2022-03-30 RX ORDER — MESALAMINE 800 MG/1
AS DIRECTED TABLET, DELAYED RELEASE ORAL
Status: ACTIVE | COMMUNITY

## 2022-03-30 RX ORDER — PROPRANOLOL HYDROCHLORIDE 40 MG/1
AS DIRECTED TABLET ORAL
Status: ACTIVE | COMMUNITY

## 2022-03-30 RX ORDER — FAMOTIDINE 40 MG/1
TAKE 1 TABLET BY MOUTH TWICE A DAY TABLET, FILM COATED ORAL TWICE A DAY
Qty: 180 TABLET | Refills: 3
Start: 2022-01-11

## 2022-03-30 RX ORDER — FAMOTIDINE 40 MG/1
TAKE 1 TABLET BY MOUTH TWICE A DAY TABLET, FILM COATED ORAL TWICE A DAY
Qty: 180 TABLET | Refills: 3 | Status: ACTIVE | COMMUNITY
Start: 2022-03-24

## 2022-03-30 RX ORDER — FLUCONAZOLE 100 MG/1
2 PO DAY ONE AND 1 PO DAILY X 13 DAYS (TOTAL OF 14 DAYS) TABLET ORAL DAILY
Qty: 15 TABLET | Refills: 0 | OUTPATIENT
Start: 2022-03-30 | End: 2022-04-13

## 2022-03-30 RX ORDER — BUDESONIDE 3 MG/1
3 CAP PO DAILY CAPSULE, COATED PELLETS ORAL ONCE A DAY
Qty: 90 CAPSULE | Refills: 1 | Status: ACTIVE | COMMUNITY
Start: 2022-03-02

## 2022-03-30 RX ORDER — HYOSCYAMINE SULFATE 0.12 MG/1
1 TABLET UNDER THE TONGUE AND ALLOW TO DISSOLVE  AS NEEDED TABLET SUBLINGUAL
Qty: 60 TABLET | Refills: 11 | OUTPATIENT
Start: 2022-03-30 | End: 2023-03-25

## 2022-03-30 RX ORDER — FAMOTIDINE 40 MG/1
TAKE 1 TABLET BY MOUTH TWICE A DAY TABLET, FILM COATED ORAL TWICE A DAY
Qty: 180 TABLET | Refills: 3 | Status: ACTIVE | COMMUNITY
Start: 2022-01-11

## 2022-03-30 RX ORDER — INFLIXIMAB 100 MG/10ML
AS DIRECTED INJECTION, POWDER, LYOPHILIZED, FOR SOLUTION INTRAVENOUS
Qty: 100 MILLIGRAMS | Refills: 0 | Status: ACTIVE | COMMUNITY
Start: 2022-03-02 | End: 2022-04-01

## 2022-03-30 NOTE — HPI-TODAY'S VISIT:
Mr. Pavon is a 71-year-old male who is referred by Dr. Justin Hinson for consultation of ulcerative colitis, PSC, and diarrhea.  A copy of this note will be sent to the referring physician.  He states all of his issues began in January 2020.  He developed a viral upper respiratory disease which was likely Covid at the time but not diagnosed.  He has had a downhill drop of his health since.  Over the course of the following months he develops gastrointestinal distress.  He underwent cholecystectomy at [place] in August 2020 with no relief.  He then had a syncopal episode later that fall and was found to be profoundly anemic.  He had an EGD and colonoscopy by Dr. Becerril and was diagnosed with ulcerative colitis in November 2020.  He was treated with steroids, mesalamine, and Humira.  He has been on and off of prednisone since.  He states so long as he is on prednisone he has 1-3 formed bowel movements daily, however anytime he comes off he goes up to 30 bowel movements daily.  His last flare started 6 to 8 weeks ago, he called Dr. Becerril's office and was placed on 20 mg of prednisone with a slow taper.  He is down to 10 mg daily and having 3-4 urgent bowel movements with some incontinence.  He does report mucus but no bleeding, he does occasionally have nocturnal diarrhea.  This spring he continued to have elevated liver enzymes, he underwent an evaluation for questionable IPMN versus PSC.  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  He was placed on azathioprine along with ursodiol.  Since this summer he continues to struggle with his GI complaints.  He is on Florastor daily.  He is taking mesalamine 2 capsules 3 times daily.  He is on Humira every other week.  He has seen no improvement in his symptoms since starting azathioprine this spring.  Unfortunately we have none of Dr. Becerril's records including his EGD, colonoscopy, pathology, liver biopsy, or imaging.  He is also undergoing neurologic work-up for tremor, however looking back this all seemed to start post viral, and has worsened with steroids.  His blood sugars have been labile along with his blood pressure.  Today he is not doing well and is here for a second opinion.  MB   1/11/2022 Bony presents for follow-up of ulcerative colitis, questionable history of primary sclerosing cholangitis, and C. difficile. Since his last visit he underwent colonoscopy in September by Dr. Willis with moderate pancolitis. He was transitioned off Humira and started on steroids and Inflectra. After receiving 2 of his induction doses he developed progressive diarrhea. He was tested and found to be C. difficile positive. He completed a course of vancomycin with recurrent symptoms and treated twice. He underwent his third infusion with a flare of severe diarrhea in the third week in December. He was admitted and found to be toxin negative but antigen and PCR positive. He underwent flexible sigmoidoscopy with no significant pseudomembranous colitis. His steroids were discontinued and he was placed on a vancomycin taper by Dr. Carrillo. Today his diarrhea is slowing down. Yesterday he states he had around 15 loose bowel movements. Today he has only had approximately 6. He is not taking Florastor. He has been treated with colestipol in the past but is not taking this recently. I was able to review his liver biopsy ordered by Dr. Becerril, it is unclear that he has PSC from his liver biopsy. He does agree to a second opinion with hepatology at [facility]. 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 He does agree to pursue Dificid plus or minus monoclonal antibody infusion if he has recurrence. He does agree to see infectious disease. We had a long discussion regarding dehydration. He does agree to fluids and repeat blood work this week. He understands that if he develops severe diarrhea abdominal cramping fever or distention that he is to proceed back to the emergency department. MB 1/25/2022 The patient presents today for follow-up of his ulcerative colitis and C. difficile with severe diarrhea.  Since our last visit, he has not been doing well.  He has been readmitted to the hospital secondary to diarrhea.  He was found to have severe C. difficile colitis.  He is having 10-20 bowel movements daily.  He denies any blood in his stool.  He denies any abdominal pain.  On flexible sigmoidoscopy, this was felt to be secondary to C. difficile.  He is tapering down his vancomycin, and he is taking colestipol twice a day.  We have discussed starting on cholestyramine 4 times a day and changing him to Dificid.  He does have a follow-up with infectious disease.  We are also going to check his labs today to make sure he is not dehydrated, and to make sure he does not need to be admitted for IV fluids.  At this point, we have discussed advancing his diet as tolerated.  Increasing his cholestyramine and adding Dificid.  Until the Dificid is improved, I do want him to take the vancomycin 4 times a day.  I have discussed this plan with the patient and his wife, and they are in agreement.  3/2/2022 Bony presents for follow up of UC and c. diff. SInce his last visit he did see ID. He never went on dificid and never had the monoclonal antibody as his repeat c. diff was negative, he completed the vanc taper and had the monoclonal ab infusion. He is doing better during the day, still loose urgent stools after meals. However at night he is having a BM every 45 minutes to an hour with tensmus. His last dose of inflectra 12/21 at 5mg/kg, that completed his induction. He is currently taking orally mesalamine 800mg TID. He is not taking the cholestyramine. His diarrhea at night is the worse. He doesn't feel like this c. diff diarrhea. Today he is still struggling with diarrhea at night and is due for infliximab. MB  This televisit was performed at the patient's home. 3/30/2022 Bony presents for follow-up of ulcerative colitis, C. difficile, reflux, diarrhea.  Since his last visit he continues to slowly improve.  He did follow-up with Dr. Landau with no indication of monoclonal antibody and repeat negative stool for C. difficile.  Currently he got his second maintenance dose of infliximab last week.  He is down to 4-5 loose bowel movements daily.  He is struggling with tenesmus but this is slowly improving.  He is almost done with his 8-week course of budesonide.  He is on cholestyramine twice daily, Florastor twice daily, and mesalamine 800 p.o. 3 times daily.  His reflux has been flaring since being off pantoprazole.  He does have some odynophagia.  I am concerned he may have a component of Candida.  He agrees to empiric treatment of Diflucan for 14 days.  His last EGD was in 2020 with reflux and gastritis by Dr. Becerril.  He is still awaiting his appointment with hepatology.  He is off azathioprine and Miles for now.  He is due for repeat blood work today.  I do want him to keep a stool study at home in case he has further symptoms of C. difficile as he lives an hour away.  We will see each other again after his next maintenance dose infusion and continue to monitor his symptoms closely.  MB

## 2022-04-05 ENCOUNTER — WEB ENCOUNTER (OUTPATIENT)
Dept: URBAN - NONMETROPOLITAN AREA CLINIC 2 | Facility: CLINIC | Age: 72
End: 2022-04-05

## 2022-04-05 RX ORDER — MESALAMINE 800 MG/1
1 PILL THREE TIMES DAILY TABLET, DELAYED RELEASE ORAL THREE TIMES A DAY
Qty: 90 | Refills: 11

## 2022-04-11 ENCOUNTER — OFFICE VISIT (OUTPATIENT)
Dept: URBAN - METROPOLITAN AREA TELEHEALTH 2 | Facility: TELEHEALTH | Age: 72
End: 2022-04-11
Payer: MEDICARE

## 2022-04-11 DIAGNOSIS — E11.9 DIABETES: ICD-10-CM

## 2022-04-11 DIAGNOSIS — K51.80 CHRONIC PANCOLONIC ULCERATIVE COLITIS: ICD-10-CM

## 2022-04-11 PROCEDURE — 97802 MEDICAL NUTRITION INDIV IN: CPT | Performed by: DIETITIAN, REGISTERED

## 2022-04-11 RX ORDER — BUDESONIDE 3 MG/1
3 CAP PO DAILY CAPSULE, COATED PELLETS ORAL ONCE A DAY
Qty: 90 CAPSULE | Refills: 1 | Status: ACTIVE | COMMUNITY
Start: 2022-03-02

## 2022-04-11 RX ORDER — HYOSCYAMINE SULFATE 0.12 MG/1
1 TABLET UNDER THE TONGUE AND ALLOW TO DISSOLVE  AS NEEDED TABLET SUBLINGUAL
Qty: 60 TABLET | Refills: 11 | Status: ACTIVE | COMMUNITY
Start: 2022-03-30 | End: 2023-03-25

## 2022-04-11 RX ORDER — FAMOTIDINE 40 MG/1
TAKE 1 TABLET BY MOUTH TWICE A DAY TABLET, FILM COATED ORAL TWICE A DAY
Qty: 180 TABLET | Refills: 3 | Status: ACTIVE | COMMUNITY
Start: 2022-01-11

## 2022-04-11 RX ORDER — FAMOTIDINE 40 MG/1
TAKE 1 TABLET BY MOUTH TWICE A DAY TABLET, FILM COATED ORAL TWICE A DAY
Qty: 180 TABLET | Refills: 3 | Status: ACTIVE | COMMUNITY
Start: 2022-03-24

## 2022-04-11 RX ORDER — PROPRANOLOL HYDROCHLORIDE 40 MG/1
AS DIRECTED TABLET ORAL
Status: ACTIVE | COMMUNITY

## 2022-04-11 RX ORDER — MESALAMINE 800 MG/1
1 PILL THREE TIMES DAILY TABLET, DELAYED RELEASE ORAL THREE TIMES A DAY
Qty: 90 | Refills: 11 | Status: ACTIVE | COMMUNITY

## 2022-04-11 RX ORDER — FLUCONAZOLE 100 MG/1
2 PO DAY ONE AND 1 PO DAILY X 13 DAYS (TOTAL OF 14 DAYS) TABLET ORAL DAILY
Qty: 15 TABLET | Refills: 0 | Status: ACTIVE | COMMUNITY
Start: 2022-03-30 | End: 2022-04-13

## 2022-04-19 ENCOUNTER — ERX REFILL RESPONSE (OUTPATIENT)
Dept: URBAN - NONMETROPOLITAN AREA CLINIC 2 | Facility: CLINIC | Age: 72
End: 2022-04-19

## 2022-04-19 RX ORDER — BUDESONIDE 3 MG/1
3 CAP PO DAILY CAPSULE, COATED PELLETS ORAL ONCE A DAY
Qty: 90 CAPSULE | Refills: 1 | OUTPATIENT

## 2022-05-04 ENCOUNTER — WEB ENCOUNTER (OUTPATIENT)
Dept: URBAN - NONMETROPOLITAN AREA CLINIC 2 | Facility: CLINIC | Age: 72
End: 2022-05-04

## 2022-05-04 ENCOUNTER — OFFICE VISIT (OUTPATIENT)
Dept: URBAN - METROPOLITAN AREA TELEHEALTH 2 | Facility: TELEHEALTH | Age: 72
End: 2022-05-04
Payer: MEDICARE

## 2022-05-04 DIAGNOSIS — A04.72 CLOSTRIDIUM DIFFICILE DIARRHEA: ICD-10-CM

## 2022-05-04 DIAGNOSIS — K21.9 GASTROESOPHAGEAL REFLUX DISEASE, UNSPECIFIED WHETHER ESOPHAGITIS PRESENT: ICD-10-CM

## 2022-05-04 DIAGNOSIS — K51.018 ULCERATIVE PANCOLITIS WITH OTHER COMPLICATION: ICD-10-CM

## 2022-05-04 DIAGNOSIS — K83.01 PSC (PRIMARY SCLEROSING CHOLANGITIS): ICD-10-CM

## 2022-05-04 PROCEDURE — 99214 OFFICE O/P EST MOD 30 MIN: CPT | Performed by: NURSE PRACTITIONER

## 2022-05-04 RX ORDER — PROPRANOLOL HYDROCHLORIDE 40 MG/1
AS DIRECTED TABLET ORAL
Status: ACTIVE | COMMUNITY

## 2022-05-04 RX ORDER — FAMOTIDINE 40 MG/1
TAKE 1 TABLET BY MOUTH TWICE A DAY TABLET, FILM COATED ORAL TWICE A DAY
Qty: 180 TABLET | Refills: 3 | Status: ACTIVE | COMMUNITY
Start: 2022-03-24

## 2022-05-04 RX ORDER — FAMOTIDINE 40 MG/1
TAKE 1 TABLET BY MOUTH TWICE A DAY TABLET, FILM COATED ORAL TWICE A DAY
Qty: 180 TABLET | Refills: 3 | Status: ACTIVE | COMMUNITY
Start: 2022-01-11

## 2022-05-04 RX ORDER — BUDESONIDE 3 MG/1
3 CAP PO DAILY CAPSULE, COATED PELLETS ORAL ONCE A DAY
Qty: 90 CAPSULE | Refills: 1 | Status: ACTIVE | COMMUNITY

## 2022-05-04 RX ORDER — METRONIDAZOLE 500 MG/1
1 TABLET TABLET ORAL THREE TIMES A DAY
Qty: 30 TABLETS | Refills: 0 | OUTPATIENT

## 2022-05-04 RX ORDER — HYOSCYAMINE SULFATE 0.12 MG/1
1 TABLET UNDER THE TONGUE AND ALLOW TO DISSOLVE  AS NEEDED TABLET SUBLINGUAL
Qty: 60 TABLET | Refills: 11 | Status: ACTIVE | COMMUNITY
Start: 2022-03-30 | End: 2023-03-25

## 2022-05-04 RX ORDER — MESALAMINE 800 MG/1
1 PILL THREE TIMES DAILY TABLET, DELAYED RELEASE ORAL THREE TIMES A DAY
Qty: 90 | Refills: 11 | Status: ACTIVE | COMMUNITY

## 2022-05-04 NOTE — HPI-TODAY'S VISIT:
Mr. Pavon is a 71-year-old male who is referred by Dr. Justin Hinson for consultation of ulcerative colitis, PSC, and diarrhea.  A copy of this note will be sent to the referring physician.  He states all of his issues began in January 2020.  He developed a viral upper respiratory disease which was likely Covid at the time but not diagnosed.  He has had a downhill drop of his health since.  Over the course of the following months he develops gastrointestinal distress.  He underwent cholecystectomy at [place] in August 2020 with no relief.  He then had a syncopal episode later that fall and was found to be profoundly anemic.  He had an EGD and colonoscopy by Dr. Becerril and was diagnosed with ulcerative colitis in November 2020.  He was treated with steroids, mesalamine, and Humira.  He has been on and off of prednisone since.  He states so long as he is on prednisone he has 1-3 formed bowel movements daily, however anytime he comes off he goes up to 30 bowel movements daily.  His last flare started 6 to 8 weeks ago, he called Dr. Becerril's office and was placed on 20 mg of prednisone with a slow taper.  He is down to 10 mg daily and having 3-4 urgent bowel movements with some incontinence.  He does report mucus but no bleeding, he does occasionally have nocturnal diarrhea.  This spring he continued to have elevated liver enzymes, he underwent an evaluation for questionable IPMN versus PSC.  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  He was placed on azathioprine along with ursodiol.  Since this summer he continues to struggle with his GI complaints.  He is on Florastor daily.  He is taking mesalamine 2 capsules 3 times daily.  He is on Humira every other week.  He has seen no improvement in his symptoms since starting azathioprine this spring.  Unfortunately we have none of Dr. Becerril's records including his EGD, colonoscopy, pathology, liver biopsy, or imaging.  He is also undergoing neurologic work-up for tremor, however looking back this all seemed to start post viral, and has worsened with steroids.  His blood sugars have been labile along with his blood pressure.  Today he is not doing well and is here for a second opinion.  MB   1/11/2022 Bony presents for follow-up of ulcerative colitis, questionable history of primary sclerosing cholangitis, and C. difficile. Since his last visit he underwent colonoscopy in September by Dr. Willis with moderate pancolitis. He was transitioned off Humira and started on steroids and Inflectra. After receiving 2 of his induction doses he developed progressive diarrhea. He was tested and found to be C. difficile positive. He completed a course of vancomycin with recurrent symptoms and treated twice. He underwent his third infusion with a flare of severe diarrhea in the third week in December. He was admitted and found to be toxin negative but antigen and PCR positive. He underwent flexible sigmoidoscopy with no significant pseudomembranous colitis. His steroids were discontinued and he was placed on a vancomycin taper by Dr. Carrillo. Today his diarrhea is slowing down. Yesterday he states he had around 15 loose bowel movements. Today he has only had approximately 6. He is not taking Florastor. He has been treated with colestipol in the past but is not taking this recently. I was able to review his liver biopsy ordered by Dr. Becerril, it is unclear that he has PSC from his liver biopsy. He does agree to a second opinion with hepatology at [place]. 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 He does agree to pursue Dificid plus or minus monoclonal antibody infusion if he has recurrence. He does agree to see infectious disease. We had a long discussion regarding dehydration. He does agree to fluids and repeat blood work this week. He understands that if he develops severe diarrhea abdominal cramping fever or distention that he is to proceed back to the emergency department. MB 1/25/2022 The patient presents today for follow-up of his ulcerative colitis and C. difficile with severe diarrhea.  Since our last visit, he has not been doing well.  He has been readmitted to the hospital secondary to diarrhea.  He was found to have severe C. difficile colitis.  He is having 10-20 bowel movements daily.  He denies any blood in his stool.  He denies any abdominal pain.  On flexible sigmoidoscopy, this was felt to be secondary to C. difficile.  He is tapering down his vancomycin, and he is taking colestipol twice a day.  We have discussed starting on cholestyramine 4 times a day and changing him to Dificid.  He does have a follow-up with infectious disease.  We are also going to check his labs today to make sure he is not dehydrated, and to make sure he does not need to be admitted for IV fluids.  At this point, we have discussed advancing his diet as tolerated.  Increasing his cholestyramine and adding Dificid.  Until the Dificid is improved, I do want him to take the vancomycin 4 times a day.  I have discussed this plan with the patient and his wife, and they are in agreement.  3/2/2022 Bony presents for follow up of UC and c. diff. SInce his last visit he did see ID. He never went on dificid and never had the monoclonal antibody as his repeat c. diff was negative, he completed the vanc taper and had the monoclonal ab infusion. He is doing better during the day, still loose urgent stools after meals. However at night he is having a BM every 45 minutes to an hour with tensmus. His last dose of inflectra 12/21 at 5mg/kg, that completed his induction. He is currently taking orally mesalamine 800mg TID. He is not taking the cholestyramine. His diarrhea at night is the worse. He doesn't feel like this c. diff diarrhea. Today he is still struggling with diarrhea at night and is due for infliximab. MB  This televisit was performed at the patient's home. 3/30/2022 Bony presents for follow-up of ulcerative colitis, C. difficile, reflux, diarrhea.  Since his last visit he continues to slowly improve.  He did follow-up with Dr. Landau with no indication of monoclonal antibody and repeat negative stool for C. difficile.  Currently he got his second maintenance dose of infliximab last week.  He is down to 4-5 loose bowel movements daily.  He is struggling with tenesmus but this is slowly improving.  He is almost done with his 8-week course of budesonide.  He is on cholestyramine twice daily, Florastor twice daily, and mesalamine 800 p.o. 3 times daily.  His reflux has been flaring since being off pantoprazole.  He does have some odynophagia.  I am concerned he may have a component of Candida.  He agrees to empiric treatment of Diflucan for 14 days.  His last EGD was in 2020 with reflux and gastritis by Dr. Becerril.  He is still awaiting his appointment with hepatology.  He is off azathioprine and Miles for now.  He is due for repeat blood work today.  I do want him to keep a stool study at home in case he has further symptoms of C. difficile as he lives an hour away.  We will see each other again after his next maintenance dose infusion and continue to monitor his symptoms closely.  MB   5/4/2022 Bony presents for follow up of UC, C. diff, reflux and diarrhea. He states last week he states he was feeling well. He was have a soft BM a few times a day. 6 days ago he developed acute urgent and watery diarrhea with a BM 20-30 times daily. He says he has incredible brain fog and incredible fatigue. He slept for 26 hours. He is up all night using the restroom. He is due for his next infliximab on 5/15. He denies any abdominal pain. He is very confused and is halucinating. Today he is very weak, he is down to 176lb. He is eating but not able to keep much down in regard to his diarrhea. MB

## 2022-05-05 ENCOUNTER — LAB OUTSIDE AN ENCOUNTER (OUTPATIENT)
Dept: URBAN - NONMETROPOLITAN AREA CLINIC 2 | Facility: CLINIC | Age: 72
End: 2022-05-05

## 2022-05-06 LAB — C DIFFICILE TOXINS A+B, EIA: NEGATIVE

## 2022-05-09 ENCOUNTER — WEB ENCOUNTER (OUTPATIENT)
Dept: URBAN - NONMETROPOLITAN AREA CLINIC 2 | Facility: CLINIC | Age: 72
End: 2022-05-09

## 2022-05-09 ENCOUNTER — TELEPHONE ENCOUNTER (OUTPATIENT)
Dept: URBAN - METROPOLITAN AREA CLINIC 92 | Facility: CLINIC | Age: 72
End: 2022-05-09

## 2022-05-09 LAB
A/G RATIO: (no result)
ALBUMIN: (no result)
ALKALINE PHOSPHATASE: (no result)
ALT (SGPT): (no result)
ANTI-INFLIXIMAB ANTIBODY: (no result)
AST (SGOT): (no result)
BASO (ABSOLUTE): (no result)
BASOS: (no result)
BILIRUBIN, TOTAL: (no result)
BUN/CREATININE RATIO: (no result)
BUN: (no result)
C-REACTIVE PROTEIN, QUANT: (no result)
CALCIUM: (no result)
CARBON DIOXIDE, TOTAL: (no result)
CHLORIDE: (no result)
CREATININE: (no result)
EGFR: (no result)
EOS (ABSOLUTE): (no result)
EOS: (no result)
GLOBULIN, TOTAL: (no result)
GLUCOSE: (no result)
HEMATOCRIT: (no result)
HEMATOLOGY COMMENTS:: (no result)
HEMOGLOBIN: (no result)
IMMATURE CELLS: (no result)
IMMATURE GRANS (ABS): (no result)
IMMATURE GRANULOCYTES: (no result)
INFLIXIMAB DRUG LEVEL: (no result)
LYMPHS (ABSOLUTE): (no result)
LYMPHS: (no result)
Lab: (no result)
Lab: (no result)
MCH: (no result)
MCHC: (no result)
MCV: (no result)
MONOCYTES(ABSOLUTE): (no result)
MONOCYTES: (no result)
NEUTROPHILS (ABSOLUTE): (no result)
NEUTROPHILS: (no result)
NRBC: (no result)
PLATELETS: (no result)
POTASSIUM: (no result)
PROTEIN, TOTAL: (no result)
RBC: (no result)
RDW: (no result)
REQUEST PROBLEM: (no result)
SEDIMENTATION RATE-WESTERGREN: (no result)
SODIUM: (no result)
SPECIMEN STATUS REPORT: (no result)
WBC: (no result)
WHITE BLOOD CELLS (WBC), STOOL: (no result)

## 2022-05-10 ENCOUNTER — WEB ENCOUNTER (OUTPATIENT)
Dept: URBAN - NONMETROPOLITAN AREA CLINIC 2 | Facility: CLINIC | Age: 72
End: 2022-05-10

## 2022-05-11 ENCOUNTER — WEB ENCOUNTER (OUTPATIENT)
Dept: URBAN - NONMETROPOLITAN AREA CLINIC 2 | Facility: CLINIC | Age: 72
End: 2022-05-11

## 2022-05-11 ENCOUNTER — LAB OUTSIDE AN ENCOUNTER (OUTPATIENT)
Dept: URBAN - NONMETROPOLITAN AREA CLINIC 2 | Facility: CLINIC | Age: 72
End: 2022-05-11

## 2022-05-11 RX ORDER — HYOSCYAMINE SULFATE 0.12 MG/1
1 TABLET UNDER THE TONGUE AND ALLOW TO DISSOLVE  AS NEEDED TABLET SUBLINGUAL
Qty: 60 TABLET | Refills: 11 | Status: ACTIVE | COMMUNITY
Start: 2022-03-30 | End: 2023-03-25

## 2022-05-11 RX ORDER — FAMOTIDINE 40 MG/1
TAKE 1 TABLET BY MOUTH TWICE A DAY TABLET, FILM COATED ORAL TWICE A DAY
Qty: 180 TABLET | Refills: 3 | Status: ACTIVE | COMMUNITY
Start: 2022-01-11

## 2022-05-11 RX ORDER — BUDESONIDE 3 MG/1
3 CAP PO DAILY CAPSULE, COATED PELLETS ORAL ONCE A DAY
Qty: 90 CAPSULE | Refills: 1 | Status: ACTIVE | COMMUNITY

## 2022-05-11 RX ORDER — METRONIDAZOLE 500 MG/1
1 TABLET TABLET ORAL THREE TIMES A DAY
Qty: 30 TABLETS | Refills: 0 | Status: ACTIVE | COMMUNITY

## 2022-05-11 RX ORDER — PROPRANOLOL HYDROCHLORIDE 40 MG/1
AS DIRECTED TABLET ORAL
Status: ACTIVE | COMMUNITY

## 2022-05-11 RX ORDER — PREDNISONE 10 MG/1
4 TAB BY MOUTH DAILY X 14 DAYS, 3 TAB DAILY X 7 DAYS, 2 TAB DAILY X 7 DAYS, 1 TAB DAILY X 7 DAYS, 1/2 TAB DAILY X 7-14 DAYS TABLET ORAL AS DIRECTED
Qty: 105 TABLET | Refills: 0 | OUTPATIENT
Start: 2022-05-11 | End: 2022-06-29

## 2022-05-11 RX ORDER — FAMOTIDINE 40 MG/1
TAKE 1 TABLET BY MOUTH TWICE A DAY TABLET, FILM COATED ORAL TWICE A DAY
Qty: 180 TABLET | Refills: 3 | Status: ACTIVE | COMMUNITY
Start: 2022-03-24

## 2022-05-11 RX ORDER — MESALAMINE 800 MG/1
1 PILL THREE TIMES DAILY TABLET, DELAYED RELEASE ORAL THREE TIMES A DAY
Qty: 90 | Refills: 11 | Status: ACTIVE | COMMUNITY

## 2022-05-16 ENCOUNTER — TELEPHONE ENCOUNTER (OUTPATIENT)
Dept: URBAN - METROPOLITAN AREA CLINIC 92 | Facility: CLINIC | Age: 72
End: 2022-05-16

## 2022-05-16 LAB
A/G RATIO: 1.2
ALBUMIN: 3.6
ALKALINE PHOSPHATASE: 136
ALT (SGPT): 31
ANTI-INFLIXIMAB ANTIBODY: 159
AST (SGOT): 18
BASO (ABSOLUTE): 0
BASOS: 0
BILIRUBIN, TOTAL: 0.8
BUN/CREATININE RATIO: 9
BUN: 12
C-REACTIVE PROTEIN, QUANT: 41
CALCIUM: 9.3
CARBON DIOXIDE, TOTAL: 24
CHLORIDE: 99
CREATININE: 1.35
EGFR: 56
EOS (ABSOLUTE): 0.1
EOS: 1
GLOBULIN, TOTAL: 3
GLUCOSE: 188
HEMATOCRIT: 42.1
HEMATOLOGY COMMENTS:: (no result)
HEMOGLOBIN: 13.2
IMMATURE CELLS: (no result)
IMMATURE GRANS (ABS): 0
IMMATURE GRANULOCYTES: 0
INFLIXIMAB DRUG LEVEL: <0.4
LYMPHS (ABSOLUTE): 0.9
LYMPHS: 11
MCH: 27.4
MCHC: 31.4
MCV: 87
MONOCYTES(ABSOLUTE): 0.5
MONOCYTES: 7
NEUTROPHILS (ABSOLUTE): 6.5
NEUTROPHILS: 81
NRBC: (no result)
PLATELETS: 244
POTASSIUM: 3.9
PROTEIN, TOTAL: 6.6
RBC: 4.82
RDW: 12.4
SEDIMENTATION RATE-WESTERGREN: 11
SODIUM: 137
WBC: 8

## 2022-05-16 RX ORDER — INFLIXIMAB 100 MG/10ML
AS DIRECTED INJECTION, POWDER, LYOPHILIZED, FOR SOLUTION INTRAVENOUS
Qty: 100 MILLIGRAMS | Refills: 0 | OUTPATIENT
Start: 2022-05-17 | End: 2022-06-16

## 2022-05-16 RX ORDER — ACETAMINOPHEN 650 MG
2 TABLETS AS NEEDED TABLET, EXTENDED RELEASE ORAL
Qty: 6 TABLET | Refills: 0 | OUTPATIENT
Start: 2022-05-17 | End: 2022-05-18

## 2022-05-16 RX ORDER — DIPHENHYDRAMINE HCL 2 %
1 CAPSULE AT BEDTIME AS NEEDED CREAM (GRAM) TOPICAL ONCE A DAY
Qty: 30 | Refills: 0 | OUTPATIENT
Start: 2022-05-17 | End: 2022-06-16

## 2022-05-17 ENCOUNTER — CLAIMS CREATED FROM THE CLAIM WINDOW (OUTPATIENT)
Dept: URBAN - NONMETROPOLITAN AREA CLINIC 1 | Facility: CLINIC | Age: 72
End: 2022-05-17
Payer: MEDICARE

## 2022-05-17 ENCOUNTER — TELEPHONE ENCOUNTER (OUTPATIENT)
Dept: URBAN - METROPOLITAN AREA CLINIC 23 | Facility: CLINIC | Age: 72
End: 2022-05-17

## 2022-05-17 ENCOUNTER — CLAIMS CREATED FROM THE CLAIM WINDOW (OUTPATIENT)
Dept: URBAN - NONMETROPOLITAN AREA CLINIC 1 | Facility: CLINIC | Age: 72
End: 2022-05-17

## 2022-05-17 DIAGNOSIS — Z12.11 COLON CANCER SCREENING: ICD-10-CM

## 2022-05-17 PROCEDURE — 992 APS NON BILLABLE: Performed by: INTERNAL MEDICINE

## 2022-05-17 PROCEDURE — 993 AGA: Performed by: INTERNAL MEDICINE

## 2022-05-17 RX ORDER — FAMOTIDINE 40 MG/1
TAKE 1 TABLET BY MOUTH TWICE A DAY TABLET, FILM COATED ORAL TWICE A DAY
Qty: 180 TABLET | Refills: 3 | Status: ACTIVE | COMMUNITY
Start: 2022-01-11

## 2022-05-17 RX ORDER — PROPRANOLOL HYDROCHLORIDE 40 MG/1
AS DIRECTED TABLET ORAL
Status: ACTIVE | COMMUNITY

## 2022-05-17 RX ORDER — MESALAMINE 800 MG/1
1 PILL THREE TIMES DAILY TABLET, DELAYED RELEASE ORAL THREE TIMES A DAY
Qty: 90 | Refills: 11 | Status: ACTIVE | COMMUNITY

## 2022-05-17 RX ORDER — FAMOTIDINE 40 MG/1
TAKE 1 TABLET BY MOUTH TWICE A DAY TABLET, FILM COATED ORAL TWICE A DAY
Qty: 180 TABLET | Refills: 3 | Status: ACTIVE | COMMUNITY
Start: 2022-03-24

## 2022-05-17 RX ORDER — PREDNISONE 10 MG/1
4 TAB BY MOUTH DAILY X 14 DAYS, 3 TAB DAILY X 7 DAYS, 2 TAB DAILY X 7 DAYS, 1 TAB DAILY X 7 DAYS, 1/2 TAB DAILY X 7-14 DAYS TABLET ORAL AS DIRECTED
Qty: 105 TABLET | Refills: 0 | Status: ACTIVE | COMMUNITY
Start: 2022-05-11 | End: 2022-06-29

## 2022-05-17 RX ORDER — HYOSCYAMINE SULFATE 0.12 MG/1
1 TABLET UNDER THE TONGUE AND ALLOW TO DISSOLVE  AS NEEDED TABLET SUBLINGUAL
Qty: 60 TABLET | Refills: 11 | Status: ACTIVE | COMMUNITY
Start: 2022-03-30 | End: 2023-03-25

## 2022-05-17 RX ORDER — METRONIDAZOLE 500 MG/1
1 TABLET TABLET ORAL THREE TIMES A DAY
Qty: 30 TABLETS | Refills: 0 | Status: ACTIVE | COMMUNITY

## 2022-05-17 RX ORDER — BUDESONIDE 3 MG/1
3 CAP PO DAILY CAPSULE, COATED PELLETS ORAL ONCE A DAY
Qty: 90 CAPSULE | Refills: 1 | Status: ACTIVE | COMMUNITY

## 2022-05-23 ENCOUNTER — TELEPHONE ENCOUNTER (OUTPATIENT)
Dept: URBAN - NONMETROPOLITAN AREA CLINIC 13 | Facility: CLINIC | Age: 72
End: 2022-05-23

## 2022-05-23 ENCOUNTER — WEB ENCOUNTER (OUTPATIENT)
Dept: URBAN - NONMETROPOLITAN AREA CLINIC 2 | Facility: CLINIC | Age: 72
End: 2022-05-23

## 2022-05-25 ENCOUNTER — CLAIMS CREATED FROM THE CLAIM WINDOW (OUTPATIENT)
Dept: URBAN - NONMETROPOLITAN AREA CLINIC 1 | Facility: CLINIC | Age: 72
End: 2022-05-25
Payer: MEDICARE

## 2022-05-25 ENCOUNTER — OFFICE VISIT (OUTPATIENT)
Dept: URBAN - NONMETROPOLITAN AREA CLINIC 1 | Facility: CLINIC | Age: 72
End: 2022-05-25

## 2022-05-25 ENCOUNTER — CLAIMS CREATED FROM THE CLAIM WINDOW (OUTPATIENT)
Dept: URBAN - NONMETROPOLITAN AREA CLINIC 1 | Facility: CLINIC | Age: 72
End: 2022-05-25

## 2022-05-25 DIAGNOSIS — K51.80 ULCERATIVE COLITIS: ICD-10-CM

## 2022-05-25 PROCEDURE — 96413 CHEMO IV INFUSION 1 HR: CPT | Performed by: INTERNAL MEDICINE

## 2022-05-25 PROCEDURE — 96365 THER/PROPH/DIAG IV INF INIT: CPT | Performed by: INTERNAL MEDICINE

## 2022-05-25 PROCEDURE — 96366 THER/PROPH/DIAG IV INF ADDON: CPT | Performed by: INTERNAL MEDICINE

## 2022-05-25 PROCEDURE — 96415 CHEMO IV INFUSION ADDL HR: CPT | Performed by: INTERNAL MEDICINE

## 2022-05-25 RX ORDER — PREDNISONE 10 MG/1
4 TAB BY MOUTH DAILY X 14 DAYS, 3 TAB DAILY X 7 DAYS, 2 TAB DAILY X 7 DAYS, 1 TAB DAILY X 7 DAYS, 1/2 TAB DAILY X 7-14 DAYS TABLET ORAL AS DIRECTED
Qty: 105 TABLET | Refills: 0 | Status: ACTIVE | COMMUNITY
Start: 2022-05-11 | End: 2022-06-29

## 2022-05-25 RX ORDER — MESALAMINE 800 MG/1
1 PILL THREE TIMES DAILY TABLET, DELAYED RELEASE ORAL THREE TIMES A DAY
Qty: 90 | Refills: 11 | Status: ACTIVE | COMMUNITY

## 2022-05-25 RX ORDER — BUDESONIDE 3 MG/1
3 CAP PO DAILY CAPSULE, COATED PELLETS ORAL ONCE A DAY
Qty: 90 CAPSULE | Refills: 1 | Status: ACTIVE | COMMUNITY

## 2022-05-25 RX ORDER — FAMOTIDINE 40 MG/1
TAKE 1 TABLET BY MOUTH TWICE A DAY TABLET, FILM COATED ORAL TWICE A DAY
Qty: 180 TABLET | Refills: 3 | Status: ACTIVE | COMMUNITY
Start: 2022-03-24

## 2022-05-25 RX ORDER — METRONIDAZOLE 500 MG/1
1 TABLET TABLET ORAL THREE TIMES A DAY
Qty: 30 TABLETS | Refills: 0 | Status: ACTIVE | COMMUNITY

## 2022-05-25 RX ORDER — HYOSCYAMINE SULFATE 0.12 MG/1
1 TABLET UNDER THE TONGUE AND ALLOW TO DISSOLVE  AS NEEDED TABLET SUBLINGUAL
Qty: 60 TABLET | Refills: 11 | Status: ACTIVE | COMMUNITY
Start: 2022-03-30 | End: 2023-03-25

## 2022-05-25 RX ORDER — PROPRANOLOL HYDROCHLORIDE 40 MG/1
AS DIRECTED TABLET ORAL
Status: ACTIVE | COMMUNITY

## 2022-05-25 RX ORDER — DIPHENHYDRAMINE HCL 2 %
1 CAPSULE AT BEDTIME AS NEEDED CREAM (GRAM) TOPICAL ONCE A DAY
Qty: 30 | Refills: 0 | Status: ACTIVE | COMMUNITY
Start: 2022-05-17 | End: 2022-06-16

## 2022-05-25 RX ORDER — INFLIXIMAB 100 MG/10ML
AS DIRECTED INJECTION, POWDER, LYOPHILIZED, FOR SOLUTION INTRAVENOUS
Qty: 100 MILLIGRAMS | Refills: 0 | Status: ACTIVE | COMMUNITY
Start: 2022-05-17 | End: 2022-06-16

## 2022-05-25 RX ORDER — FAMOTIDINE 40 MG/1
TAKE 1 TABLET BY MOUTH TWICE A DAY TABLET, FILM COATED ORAL TWICE A DAY
Qty: 180 TABLET | Refills: 3 | Status: ACTIVE | COMMUNITY
Start: 2022-01-11

## 2022-05-26 ENCOUNTER — WEB ENCOUNTER (OUTPATIENT)
Dept: URBAN - NONMETROPOLITAN AREA CLINIC 2 | Facility: CLINIC | Age: 72
End: 2022-05-26

## 2022-05-30 ENCOUNTER — WEB ENCOUNTER (OUTPATIENT)
Dept: URBAN - NONMETROPOLITAN AREA CLINIC 2 | Facility: CLINIC | Age: 72
End: 2022-05-30

## 2022-05-31 ENCOUNTER — OFFICE VISIT (OUTPATIENT)
Dept: URBAN - NONMETROPOLITAN AREA CLINIC 2 | Facility: CLINIC | Age: 72
End: 2022-05-31

## 2022-06-01 ENCOUNTER — WEB ENCOUNTER (OUTPATIENT)
Dept: URBAN - NONMETROPOLITAN AREA CLINIC 2 | Facility: CLINIC | Age: 72
End: 2022-06-01

## 2022-06-03 ENCOUNTER — TELEPHONE ENCOUNTER (OUTPATIENT)
Dept: URBAN - NONMETROPOLITAN AREA CLINIC 2 | Facility: CLINIC | Age: 72
End: 2022-06-03

## 2022-06-16 ENCOUNTER — OFFICE VISIT (OUTPATIENT)
Dept: URBAN - METROPOLITAN AREA MEDICAL CENTER 1 | Facility: MEDICAL CENTER | Age: 72
End: 2022-06-16
Payer: MEDICARE

## 2022-06-16 DIAGNOSIS — R19.7 ACUTE DIARRHEA: ICD-10-CM

## 2022-06-16 DIAGNOSIS — K51.00 ACUTE ULCERATIVE PANCOLITIS: ICD-10-CM

## 2022-06-16 PROCEDURE — 45331 SIGMOIDOSCOPY AND BIOPSY: CPT | Performed by: INTERNAL MEDICINE

## 2022-06-22 ENCOUNTER — OFFICE VISIT (OUTPATIENT)
Dept: URBAN - NONMETROPOLITAN AREA CLINIC 2 | Facility: CLINIC | Age: 72
End: 2022-06-22
Payer: MEDICARE

## 2022-06-22 VITALS
BODY MASS INDEX: 23.84 KG/M2 | TEMPERATURE: 97.8 F | HEIGHT: 72 IN | SYSTOLIC BLOOD PRESSURE: 114 MMHG | HEART RATE: 91 BPM | WEIGHT: 176 LBS | DIASTOLIC BLOOD PRESSURE: 68 MMHG

## 2022-06-22 DIAGNOSIS — A04.72 CLOSTRIDIUM DIFFICILE DIARRHEA: ICD-10-CM

## 2022-06-22 DIAGNOSIS — K21.9 GASTROESOPHAGEAL REFLUX DISEASE, UNSPECIFIED WHETHER ESOPHAGITIS PRESENT: ICD-10-CM

## 2022-06-22 DIAGNOSIS — K83.01 PSC (PRIMARY SCLEROSING CHOLANGITIS): ICD-10-CM

## 2022-06-22 DIAGNOSIS — Z12.11 COLON CANCER SCREENING: ICD-10-CM

## 2022-06-22 DIAGNOSIS — K51.018 ULCERATIVE PANCOLITIS WITH OTHER COMPLICATION: ICD-10-CM

## 2022-06-22 DIAGNOSIS — R19.7 DIARRHEA, UNSPECIFIED TYPE: ICD-10-CM

## 2022-06-22 PROCEDURE — 99214 OFFICE O/P EST MOD 30 MIN: CPT | Performed by: NURSE PRACTITIONER

## 2022-06-22 RX ORDER — FAMOTIDINE 40 MG/1
TAKE 1 TABLET BY MOUTH TWICE A DAY TABLET, FILM COATED ORAL TWICE A DAY
Qty: 180 TABLET | Refills: 3 | Status: ACTIVE | COMMUNITY
Start: 2022-03-24

## 2022-06-22 RX ORDER — HYOSCYAMINE SULFATE 0.12 MG/1
1 TABLET UNDER THE TONGUE AND ALLOW TO DISSOLVE  AS NEEDED TABLET SUBLINGUAL
Qty: 60 TABLET | Refills: 11 | Status: ACTIVE | COMMUNITY
Start: 2022-03-30 | End: 2023-03-25

## 2022-06-22 RX ORDER — PREDNISONE 10 MG/1
4 TAB BY MOUTH DAILY X 14 DAYS, 3 TAB DAILY X 7 DAYS, 2 TAB DAILY X 7 DAYS, 1 TAB DAILY X 7 DAYS, 1/2 TAB DAILY X 7-14 DAYS TABLET ORAL AS DIRECTED
Qty: 105 TABLET | Refills: 0 | Status: ACTIVE | COMMUNITY
Start: 2022-05-11 | End: 2022-06-29

## 2022-06-22 RX ORDER — BUDESONIDE 3 MG/1
3 CAP PO DAILY CAPSULE, COATED PELLETS ORAL ONCE A DAY
Qty: 90 CAPSULE | Refills: 1 | Status: ACTIVE | COMMUNITY

## 2022-06-22 RX ORDER — MESALAMINE 800 MG/1
1 PILL THREE TIMES DAILY TABLET, DELAYED RELEASE ORAL THREE TIMES A DAY
Qty: 90 | Refills: 11 | Status: ACTIVE | COMMUNITY

## 2022-06-22 RX ORDER — FAMOTIDINE 40 MG/1
TAKE 1 TABLET BY MOUTH TWICE A DAY TABLET, FILM COATED ORAL TWICE A DAY
Qty: 180 TABLET | Refills: 3 | Status: ACTIVE | COMMUNITY
Start: 2022-01-11

## 2022-06-22 RX ORDER — METRONIDAZOLE 500 MG/1
1 TABLET TABLET ORAL THREE TIMES A DAY
Qty: 30 TABLETS | Refills: 0 | Status: ACTIVE | COMMUNITY

## 2022-06-22 RX ORDER — PROPRANOLOL HYDROCHLORIDE 40 MG/1
AS DIRECTED TABLET ORAL
Status: ACTIVE | COMMUNITY

## 2022-06-22 RX ORDER — PREDNISONE 10 MG/1
4 TAB BY MOUTH DAILY X 14 DAYS, 3 TAB DAILY X 7 DAYS, 2 TAB DAILY X 7 DAYS, 1 TAB DAILY X 7 DAYS, 1/2 TAB DAILY X 7-14 DAYS TABLET ORAL AS DIRECTED
Qty: 105 TABLET | Refills: 0 | OUTPATIENT
Start: 2022-06-22 | End: 2022-08-10

## 2022-06-22 NOTE — HPI-TODAY'S VISIT:
Mr. Pavon is a 71-year-old male who is referred by Dr. Justin Hinson for consultation of ulcerative colitis, PSC, and diarrhea.  A copy of this note will be sent to the referring physician.  He states all of his issues began in January 2020.  He developed a viral upper respiratory disease which was likely Covid at the time but not diagnosed.  He has had a downhill drop of his health since.  Over the course of the following months he develops gastrointestinal distress.  He underwent cholecystectomy at [place] in August 2020 with no relief.  He then had a syncopal episode later that fall and was found to be profoundly anemic.  He had an EGD and colonoscopy by Dr. Becerril and was diagnosed with ulcerative colitis in November 2020.  He was treated with steroids, mesalamine, and Humira.  He has been on and off of prednisone since.  He states so long as he is on prednisone he has 1-3 formed bowel movements daily, however anytime he comes off he goes up to 30 bowel movements daily.  His last flare started 6 to 8 weeks ago, he called Dr. Becerril's office and was placed on 20 mg of prednisone with a slow taper.  He is down to 10 mg daily and having 3-4 urgent bowel movements with some incontinence.  He does report mucus but no bleeding, he does occasionally have nocturnal diarrhea.  This spring he continued to have elevated liver enzymes, he underwent an evaluation for questionable IPMN versus PSC.  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  He was placed on azathioprine along with ursodiol.  Since this summer he continues to struggle with his GI complaints.  He is on Florastor daily.  He is taking mesalamine 2 capsules 3 times daily.  He is on Humira every other week.  He has seen no improvement in his symptoms since starting azathioprine this spring.  Unfortunately we have none of Dr. Becerril's records including his EGD, colonoscopy, pathology, liver biopsy, or imaging.  He is also undergoing neurologic work-up for tremor, however looking back this all seemed to start post viral, and has worsened with steroids.  His blood sugars have been labile along with his blood pressure.  Today he is not doing well and is here for a second opinion.  MB   1/11/2022 Bony presents for follow-up of ulcerative colitis, questionable history of primary sclerosing cholangitis, and C. difficile. Since his last visit he underwent colonoscopy in September by Dr. Willis with moderate pancolitis. He was transitioned off Humira and started on steroids and Inflectra. After receiving 2 of his induction doses he developed progressive diarrhea. He was tested and found to be C. difficile positive. He completed a course of vancomycin with recurrent symptoms and treated twice. He underwent his third infusion with a flare of severe diarrhea in the third week in December. He was admitted and found to be toxin negative but antigen and PCR positive. He underwent flexible sigmoidoscopy with no significant pseudomembranous colitis. His steroids were discontinued and he was placed on a vancomycin taper by Dr. Carrillo. Today his diarrhea is slowing down. Yesterday he states he had around 15 loose bowel movements. Today he has only had approximately 6. He is not taking Florastor. He has been treated with colestipol in the past but is not taking this recently. I was able to review his liver biopsy ordered by Dr. Becerril, it is unclear that he has PSC from his liver biopsy. He does agree to a second opinion with hepatology at [facility]. 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 He does agree to pursue Dificid plus or minus monoclonal antibody infusion if he has recurrence. He does agree to see infectious disease. We had a long discussion regarding dehydration. He does agree to fluids and repeat blood work this week. He understands that if he develops severe diarrhea abdominal cramping fever or distention that he is to proceed back to the emergency department. MB 1/25/2022 The patient presents today for follow-up of his ulcerative colitis and C. difficile with severe diarrhea.  Since our last visit, he has not been doing well.  He has been readmitted to the hospital secondary to diarrhea.  He was found to have severe C. difficile colitis.  He is having 10-20 bowel movements daily.  He denies any blood in his stool.  He denies any abdominal pain.  On flexible sigmoidoscopy, this was felt to be secondary to C. difficile.  He is tapering down his vancomycin, and he is taking colestipol twice a day.  We have discussed starting on cholestyramine 4 times a day and changing him to Dificid.  He does have a follow-up with infectious disease.  We are also going to check his labs today to make sure he is not dehydrated, and to make sure he does not need to be admitted for IV fluids.  At this point, we have discussed advancing his diet as tolerated.  Increasing his cholestyramine and adding Dificid.  Until the Dificid is improved, I do want him to take the vancomycin 4 times a day.  I have discussed this plan with the patient and his wife, and they are in agreement.  3/2/2022 Bony presents for follow up of UC and c. diff. SInce his last visit he did see ID. He never went on dificid and never had the monoclonal antibody as his repeat c. diff was negative, he completed the vanc taper and had the monoclonal ab infusion. He is doing better during the day, still loose urgent stools after meals. However at night he is having a BM every 45 minutes to an hour with tensmus. His last dose of inflectra 12/21 at 5mg/kg, that completed his induction. He is currently taking orally mesalamine 800mg TID. He is not taking the cholestyramine. His diarrhea at night is the worse. He doesn't feel like this c. diff diarrhea. Today he is still struggling with diarrhea at night and is due for infliximab. MB  This televisit was performed at the patient's home. 3/30/2022 Bony presents for follow-up of ulcerative colitis, C. difficile, reflux, diarrhea.  Since his last visit he continues to slowly improve.  He did follow-up with Dr. Landau with no indication of monoclonal antibody and repeat negative stool for C. difficile.  Currently he got his second maintenance dose of infliximab last week.  He is down to 4-5 loose bowel movements daily.  He is struggling with tenesmus but this is slowly improving.  He is almost done with his 8-week course of budesonide.  He is on cholestyramine twice daily, Florastor twice daily, and mesalamine 800 p.o. 3 times daily.  His reflux has been flaring since being off pantoprazole.  He does have some odynophagia.  I am concerned he may have a component of Candida.  He agrees to empiric treatment of Diflucan for 14 days.  His last EGD was in 2020 with reflux and gastritis by Dr. Becerril.  He is still awaiting his appointment with hepatology.  He is off azathioprine and Miles for now.  He is due for repeat blood work today.  I do want him to keep a stool study at home in case he has further symptoms of C. difficile as he lives an hour away.  We will see each other again after his next maintenance dose infusion and continue to monitor his symptoms closely.  MB   5/4/2022 Bony presents for follow up of UC, C. diff, reflux and diarrhea. He states last week he states he was feeling well. He was have a soft BM a few times a day. 6 days ago he developed acute urgent and watery diarrhea with a BM 20-30 times daily. He says he has incredible brain fog and incredible fatigue. He slept for 26 hours. He is up all night using the restroom. He is due for his next infliximab on 5/15. He denies any abdominal pain. He is very confused and is halucinating. Today he is very weak, he is down to 176lb. He is eating but not able to keep much down in regard to his diarrhea. MB 6/22/2022 Bony presents for follow-up of ulcerative colitis.  Since his last visit his repeat drug titers show elevated serum antibody and 0 serum drug level.  We increased his infliximab dose to 10 mg/kg and have scheduled this every 6 weeks.  He received a dose on May 25 and had also started a prednisone taper which she is finishing up this week down to 5 mg.  His flexible sigmoidoscopy in June shows moderate left-sided disease, his biopsies actually showed mildly active disease.  Today we have discussed his work-up.  He agrees to repeat his infliximab titers at trough to see if he has any further antibody development and to see if his serum drug level is appropriate at the higher dose.  If not he agrees to consider via [facility].  If so he agrees to give the infliximab a few more doses to see if things improve.  He can continues the mesalamine 3 times daily, the Florastor twice daily, and the cholestyramine twice daily.  MB

## 2022-06-27 ENCOUNTER — TELEPHONE ENCOUNTER (OUTPATIENT)
Dept: URBAN - NONMETROPOLITAN AREA CLINIC 2 | Facility: CLINIC | Age: 72
End: 2022-06-27

## 2022-06-29 ENCOUNTER — LAB OUTSIDE AN ENCOUNTER (OUTPATIENT)
Dept: URBAN - NONMETROPOLITAN AREA CLINIC 2 | Facility: CLINIC | Age: 72
End: 2022-06-29

## 2022-07-05 ENCOUNTER — OFFICE VISIT (OUTPATIENT)
Dept: URBAN - NONMETROPOLITAN AREA CLINIC 1 | Facility: CLINIC | Age: 72
End: 2022-07-05
Payer: MEDICARE

## 2022-07-05 ENCOUNTER — WEB ENCOUNTER (OUTPATIENT)
Dept: URBAN - NONMETROPOLITAN AREA CLINIC 2 | Facility: CLINIC | Age: 72
End: 2022-07-05

## 2022-07-05 VITALS
WEIGHT: 184.2 LBS | HEIGHT: 72 IN | SYSTOLIC BLOOD PRESSURE: 145 MMHG | BODY MASS INDEX: 24.95 KG/M2 | DIASTOLIC BLOOD PRESSURE: 71 MMHG

## 2022-07-05 DIAGNOSIS — K51.80 CHRONIC PANCOLONIC ULCERATIVE COLITIS: ICD-10-CM

## 2022-07-05 PROCEDURE — 96366 THER/PROPH/DIAG IV INF ADDON: CPT | Performed by: INTERNAL MEDICINE

## 2022-07-05 PROCEDURE — 96365 THER/PROPH/DIAG IV INF INIT: CPT | Performed by: INTERNAL MEDICINE

## 2022-07-05 RX ORDER — METRONIDAZOLE 500 MG/1
1 TABLET TABLET ORAL THREE TIMES A DAY
Qty: 30 TABLETS | Refills: 0 | Status: ACTIVE | COMMUNITY

## 2022-07-05 RX ORDER — BUDESONIDE 3 MG/1
3 CAP PO DAILY CAPSULE, COATED PELLETS ORAL ONCE A DAY
Qty: 90 CAPSULE | Refills: 1 | Status: ACTIVE | COMMUNITY

## 2022-07-05 RX ORDER — FAMOTIDINE 40 MG/1
TAKE 1 TABLET BY MOUTH TWICE A DAY TABLET, FILM COATED ORAL TWICE A DAY
Qty: 180 TABLET | Refills: 3 | Status: ACTIVE | COMMUNITY
Start: 2022-03-24

## 2022-07-05 RX ORDER — PROPRANOLOL HYDROCHLORIDE 40 MG/1
AS DIRECTED TABLET ORAL
Status: ACTIVE | COMMUNITY

## 2022-07-05 RX ORDER — PREDNISONE 10 MG/1
4 TAB BY MOUTH DAILY X 14 DAYS, 3 TAB DAILY X 7 DAYS, 2 TAB DAILY X 7 DAYS, 1 TAB DAILY X 7 DAYS, 1/2 TAB DAILY X 7-14 DAYS TABLET ORAL AS DIRECTED
Qty: 105 TABLET | Refills: 0 | Status: ACTIVE | COMMUNITY
Start: 2022-06-22 | End: 2022-08-10

## 2022-07-05 RX ORDER — HYOSCYAMINE SULFATE 0.12 MG/1
1 TABLET UNDER THE TONGUE AND ALLOW TO DISSOLVE  AS NEEDED TABLET SUBLINGUAL
Qty: 60 TABLET | Refills: 11 | Status: ACTIVE | COMMUNITY
Start: 2022-03-30 | End: 2023-03-25

## 2022-07-05 RX ORDER — FAMOTIDINE 40 MG/1
TAKE 1 TABLET BY MOUTH TWICE A DAY TABLET, FILM COATED ORAL TWICE A DAY
Qty: 180 TABLET | Refills: 3 | Status: ACTIVE | COMMUNITY
Start: 2022-01-11

## 2022-07-05 RX ORDER — MESALAMINE 800 MG/1
1 PILL THREE TIMES DAILY TABLET, DELAYED RELEASE ORAL THREE TIMES A DAY
Qty: 90 | Refills: 11 | Status: ACTIVE | COMMUNITY

## 2022-07-07 ENCOUNTER — TELEPHONE ENCOUNTER (OUTPATIENT)
Dept: URBAN - NONMETROPOLITAN AREA CLINIC 1 | Facility: CLINIC | Age: 72
End: 2022-07-07

## 2022-07-07 LAB
A/G RATIO: 1.2
ALBUMIN: 3.8
ALKALINE PHOSPHATASE: 124
ALT (SGPT): 20
ANTI-INFLIXIMAB ANTIBODY: 45
AST (SGOT): 18
BASO (ABSOLUTE): 0
BASOS: 1
BILIRUBIN, TOTAL: 0.4
BUN/CREATININE RATIO: 9
BUN: 11
CALCIUM: 9.2
CARBON DIOXIDE, TOTAL: 23
CHLORIDE: 97
CREATININE: 1.29
EGFR: 59
EOS (ABSOLUTE): 0.1
EOS: 1
GLOBULIN, TOTAL: 3.1
GLUCOSE: 249
HEMATOCRIT: 41.8
HEMATOLOGY COMMENTS:: (no result)
HEMOGLOBIN: 13.4
IMMATURE CELLS: (no result)
IMMATURE GRANS (ABS): 0
IMMATURE GRANULOCYTES: 0
INFLIXIMAB DRUG LEVEL: 3.2
LYMPHS (ABSOLUTE): 1.3
LYMPHS: 18
MCH: 27.5
MCHC: 32.1
MCV: 86
MONOCYTES(ABSOLUTE): 0.5
MONOCYTES: 7
NEUTROPHILS (ABSOLUTE): 5.4
NEUTROPHILS: 73
NRBC: (no result)
PLATELETS: 299
POTASSIUM: 3.8
PROTEIN, TOTAL: 6.9
RBC: 4.88
RDW: 15
SODIUM: 136
WBC: 7.4

## 2022-07-07 RX ORDER — VEDOLIZUMAB 300 MG/5ML
AS DIRECTED INJECTION, POWDER, LYOPHILIZED, FOR SOLUTION INTRAVENOUS
Qty: 300 MILLIGRAMS | Refills: 0 | OUTPATIENT
Start: 2022-07-12 | End: 2022-10-10

## 2022-07-11 ENCOUNTER — WEB ENCOUNTER (OUTPATIENT)
Dept: URBAN - NONMETROPOLITAN AREA CLINIC 2 | Facility: CLINIC | Age: 72
End: 2022-07-11

## 2022-07-29 ENCOUNTER — TELEPHONE ENCOUNTER (OUTPATIENT)
Dept: URBAN - METROPOLITAN AREA CLINIC 98 | Facility: CLINIC | Age: 72
End: 2022-07-29

## 2022-08-16 ENCOUNTER — OFFICE VISIT (OUTPATIENT)
Dept: URBAN - NONMETROPOLITAN AREA CLINIC 1 | Facility: CLINIC | Age: 72
End: 2022-08-16

## 2022-08-16 RX ORDER — HYOSCYAMINE SULFATE 0.12 MG/1
1 TABLET UNDER THE TONGUE AND ALLOW TO DISSOLVE  AS NEEDED TABLET SUBLINGUAL
Qty: 60 TABLET | Refills: 11 | Status: ACTIVE | COMMUNITY
Start: 2022-03-30 | End: 2023-03-25

## 2022-08-16 RX ORDER — PROPRANOLOL HYDROCHLORIDE 40 MG/1
AS DIRECTED TABLET ORAL
Status: ACTIVE | COMMUNITY

## 2022-08-16 RX ORDER — BUDESONIDE 3 MG/1
3 CAP PO DAILY CAPSULE, COATED PELLETS ORAL ONCE A DAY
Qty: 90 CAPSULE | Refills: 1 | Status: ACTIVE | COMMUNITY

## 2022-08-16 RX ORDER — FAMOTIDINE 40 MG/1
TAKE 1 TABLET BY MOUTH TWICE A DAY TABLET, FILM COATED ORAL TWICE A DAY
Qty: 180 TABLET | Refills: 3 | Status: ACTIVE | COMMUNITY
Start: 2022-03-24

## 2022-08-16 RX ORDER — FAMOTIDINE 40 MG/1
TAKE 1 TABLET BY MOUTH TWICE A DAY TABLET, FILM COATED ORAL TWICE A DAY
Qty: 180 TABLET | Refills: 3 | Status: ACTIVE | COMMUNITY
Start: 2022-01-11

## 2022-08-16 RX ORDER — MESALAMINE 800 MG/1
1 PILL THREE TIMES DAILY TABLET, DELAYED RELEASE ORAL THREE TIMES A DAY
Qty: 90 | Refills: 11 | Status: ACTIVE | COMMUNITY

## 2022-08-16 RX ORDER — METRONIDAZOLE 500 MG/1
1 TABLET TABLET ORAL THREE TIMES A DAY
Qty: 30 TABLETS | Refills: 0 | Status: ACTIVE | COMMUNITY

## 2022-08-16 RX ORDER — VEDOLIZUMAB 300 MG/5ML
AS DIRECTED INJECTION, POWDER, LYOPHILIZED, FOR SOLUTION INTRAVENOUS
Qty: 300 MILLIGRAMS | Refills: 0 | Status: ACTIVE | COMMUNITY
Start: 2022-07-12 | End: 2022-10-10

## 2022-08-24 ENCOUNTER — OFFICE VISIT (OUTPATIENT)
Dept: URBAN - NONMETROPOLITAN AREA CLINIC 1 | Facility: CLINIC | Age: 72
End: 2022-08-24
Payer: MEDICARE

## 2022-08-24 VITALS
BODY MASS INDEX: 26.3 KG/M2 | DIASTOLIC BLOOD PRESSURE: 59 MMHG | HEIGHT: 72 IN | WEIGHT: 194.2 LBS | SYSTOLIC BLOOD PRESSURE: 118 MMHG

## 2022-08-24 DIAGNOSIS — K51.00 ULCERATIVE PANCOLITIS: ICD-10-CM

## 2022-08-24 PROCEDURE — 96365 THER/PROPH/DIAG IV INF INIT: CPT | Performed by: INTERNAL MEDICINE

## 2022-08-24 RX ORDER — VEDOLIZUMAB 300 MG/5ML
AS DIRECTED INJECTION, POWDER, LYOPHILIZED, FOR SOLUTION INTRAVENOUS
Qty: 300 MILLIGRAMS | Refills: 0 | Status: ACTIVE | COMMUNITY
Start: 2022-07-12 | End: 2022-10-10

## 2022-08-24 RX ORDER — METRONIDAZOLE 500 MG/1
1 TABLET TABLET ORAL THREE TIMES A DAY
Qty: 30 TABLETS | Refills: 0 | Status: ACTIVE | COMMUNITY

## 2022-08-24 RX ORDER — HYOSCYAMINE SULFATE 0.12 MG/1
1 TABLET UNDER THE TONGUE AND ALLOW TO DISSOLVE  AS NEEDED TABLET SUBLINGUAL
Qty: 60 TABLET | Refills: 11 | Status: ACTIVE | COMMUNITY
Start: 2022-03-30 | End: 2023-03-25

## 2022-08-24 RX ORDER — PROPRANOLOL HYDROCHLORIDE 40 MG/1
AS DIRECTED TABLET ORAL
Status: ACTIVE | COMMUNITY

## 2022-08-24 RX ORDER — MESALAMINE 800 MG/1
1 PILL THREE TIMES DAILY TABLET, DELAYED RELEASE ORAL THREE TIMES A DAY
Qty: 90 | Refills: 11 | Status: ACTIVE | COMMUNITY

## 2022-08-24 RX ORDER — FAMOTIDINE 40 MG/1
TAKE 1 TABLET BY MOUTH TWICE A DAY TABLET, FILM COATED ORAL TWICE A DAY
Qty: 180 TABLET | Refills: 3 | Status: ACTIVE | COMMUNITY
Start: 2022-03-24

## 2022-08-24 RX ORDER — FAMOTIDINE 40 MG/1
TAKE 1 TABLET BY MOUTH TWICE A DAY TABLET, FILM COATED ORAL TWICE A DAY
Qty: 180 TABLET | Refills: 3 | Status: ACTIVE | COMMUNITY
Start: 2022-01-11

## 2022-08-24 RX ORDER — BUDESONIDE 3 MG/1
3 CAP PO DAILY CAPSULE, COATED PELLETS ORAL ONCE A DAY
Qty: 90 CAPSULE | Refills: 1 | Status: ACTIVE | COMMUNITY

## 2022-09-01 ENCOUNTER — WEB ENCOUNTER (OUTPATIENT)
Dept: URBAN - NONMETROPOLITAN AREA CLINIC 2 | Facility: CLINIC | Age: 72
End: 2022-09-01

## 2022-09-01 RX ORDER — FAMOTIDINE 40 MG/1
TAKE 1 TABLET BY MOUTH TWICE A DAY TABLET, FILM COATED ORAL TWICE A DAY
Qty: 180 TABLET | Refills: 3 | Status: ACTIVE | COMMUNITY
Start: 2022-03-24

## 2022-09-01 RX ORDER — FAMOTIDINE 40 MG/1
TAKE 1 TABLET BY MOUTH TWICE A DAY TABLET, FILM COATED ORAL TWICE A DAY
Qty: 180 TABLET | Refills: 3 | Status: ACTIVE | COMMUNITY
Start: 2022-01-11

## 2022-09-01 RX ORDER — METRONIDAZOLE 500 MG/1
1 TABLET TABLET ORAL THREE TIMES A DAY
Qty: 30 TABLETS | Refills: 0 | Status: ACTIVE | COMMUNITY

## 2022-09-01 RX ORDER — PROPRANOLOL HYDROCHLORIDE 40 MG/1
AS DIRECTED TABLET ORAL
Status: ACTIVE | COMMUNITY

## 2022-09-01 RX ORDER — HYOSCYAMINE SULFATE 0.12 MG/1
1 TABLET UNDER THE TONGUE AND ALLOW TO DISSOLVE  AS NEEDED TABLET SUBLINGUAL
Qty: 60 TABLET | Refills: 11 | Status: ACTIVE | COMMUNITY
Start: 2022-03-30 | End: 2023-03-25

## 2022-09-01 RX ORDER — PREDNISONE 10 MG/1
4 TABLET DAILY BY MOUTH X 3 DAYS, 3 TAB DAILY X 3 DAYS, 2 TAB DAILY X 3 DAYS, 1 TAB DAILY X 3 DAYS TABLET ORAL AS DIRECTED
Qty: 30 TABLET | Refills: 0 | OUTPATIENT
Start: 2022-09-01 | End: 2022-09-13

## 2022-09-01 RX ORDER — MESALAMINE 800 MG/1
1 PILL THREE TIMES DAILY TABLET, DELAYED RELEASE ORAL THREE TIMES A DAY
Qty: 90 | Refills: 11 | Status: ACTIVE | COMMUNITY

## 2022-09-01 RX ORDER — VEDOLIZUMAB 300 MG/5ML
AS DIRECTED INJECTION, POWDER, LYOPHILIZED, FOR SOLUTION INTRAVENOUS
Qty: 300 MILLIGRAMS | Refills: 0 | Status: ACTIVE | COMMUNITY
Start: 2022-07-12 | End: 2022-10-10

## 2022-09-01 RX ORDER — BUDESONIDE 3 MG/1
3 CAP PO DAILY CAPSULE, COATED PELLETS ORAL ONCE A DAY
Qty: 90 CAPSULE | Refills: 1 | Status: ACTIVE | COMMUNITY

## 2022-09-02 ENCOUNTER — OFFICE VISIT (OUTPATIENT)
Dept: URBAN - NONMETROPOLITAN AREA CLINIC 2 | Facility: CLINIC | Age: 72
End: 2022-09-02
Payer: MEDICARE

## 2022-09-02 VITALS
HEART RATE: 70 BPM | BODY MASS INDEX: 25.6 KG/M2 | TEMPERATURE: 97.5 F | WEIGHT: 189 LBS | DIASTOLIC BLOOD PRESSURE: 68 MMHG | SYSTOLIC BLOOD PRESSURE: 141 MMHG | HEIGHT: 72 IN

## 2022-09-02 DIAGNOSIS — R19.7 DIARRHEA, UNSPECIFIED TYPE: ICD-10-CM

## 2022-09-02 DIAGNOSIS — Z12.11 COLON CANCER SCREENING: ICD-10-CM

## 2022-09-02 DIAGNOSIS — A04.72 CLOSTRIDIUM DIFFICILE DIARRHEA: ICD-10-CM

## 2022-09-02 DIAGNOSIS — K51.018 ULCERATIVE PANCOLITIS WITH OTHER COMPLICATION: ICD-10-CM

## 2022-09-02 DIAGNOSIS — K83.01 PSC (PRIMARY SCLEROSING CHOLANGITIS): ICD-10-CM

## 2022-09-02 DIAGNOSIS — K21.9 GASTROESOPHAGEAL REFLUX DISEASE, UNSPECIFIED WHETHER ESOPHAGITIS PRESENT: ICD-10-CM

## 2022-09-02 PROCEDURE — 99214 OFFICE O/P EST MOD 30 MIN: CPT | Performed by: NURSE PRACTITIONER

## 2022-09-02 RX ORDER — PROPRANOLOL HYDROCHLORIDE 40 MG/1
AS DIRECTED TABLET ORAL
Status: ACTIVE | COMMUNITY

## 2022-09-02 RX ORDER — FAMOTIDINE 40 MG/1
TAKE 1 TABLET BY MOUTH TWICE A DAY TABLET, FILM COATED ORAL TWICE A DAY
Qty: 180 TABLET | Refills: 3 | Status: ACTIVE | COMMUNITY
Start: 2022-03-24

## 2022-09-02 RX ORDER — HYOSCYAMINE SULFATE 0.12 MG/1
1 TABLET UNDER THE TONGUE AND ALLOW TO DISSOLVE  AS NEEDED TABLET SUBLINGUAL
Qty: 60 TABLET | Refills: 11 | Status: ACTIVE | COMMUNITY
Start: 2022-03-30 | End: 2023-03-25

## 2022-09-02 RX ORDER — BUDESONIDE 3 MG/1
3 CAP PO DAILY CAPSULE, COATED PELLETS ORAL ONCE A DAY
Qty: 90 CAPSULE | Refills: 1 | Status: ACTIVE | COMMUNITY

## 2022-09-02 RX ORDER — MESALAMINE 800 MG/1
1 PILL THREE TIMES DAILY TABLET, DELAYED RELEASE ORAL THREE TIMES A DAY
Qty: 90 | Refills: 11 | Status: ACTIVE | COMMUNITY

## 2022-09-02 RX ORDER — FAMOTIDINE 40 MG/1
TAKE 1 TABLET BY MOUTH TWICE A DAY TABLET, FILM COATED ORAL TWICE A DAY
Qty: 180 TABLET | Refills: 3 | Status: ACTIVE | COMMUNITY
Start: 2022-01-11

## 2022-09-02 RX ORDER — METRONIDAZOLE 500 MG/1
1 TABLET TABLET ORAL THREE TIMES A DAY
Qty: 30 TABLETS | Refills: 0 | Status: ACTIVE | COMMUNITY

## 2022-09-02 RX ORDER — VEDOLIZUMAB 300 MG/5ML
AS DIRECTED INJECTION, POWDER, LYOPHILIZED, FOR SOLUTION INTRAVENOUS
Qty: 300 MILLIGRAMS | Refills: 0 | Status: ACTIVE | COMMUNITY
Start: 2022-07-12 | End: 2022-10-10

## 2022-09-02 RX ORDER — PREDNISONE 10 MG/1
4 TABLET DAILY BY MOUTH X 3 DAYS, 3 TAB DAILY X 3 DAYS, 2 TAB DAILY X 3 DAYS, 1 TAB DAILY X 3 DAYS TABLET ORAL AS DIRECTED
Qty: 30 TABLET | Refills: 0 | Status: ACTIVE | COMMUNITY
Start: 2022-09-01 | End: 2022-09-13

## 2022-09-02 NOTE — HPI-TODAY'S VISIT:
Mr. Pavon is a 71-year-old male who is referred by Dr. Justin Hinson for consultation of ulcerative colitis, PSC, and diarrhea.  A copy of this note will be sent to the referring physician.  He states all of his issues began in January 2020.  He developed a viral upper respiratory disease which was likely Covid at the time but not diagnosed.  He has had a downhill drop of his health since.  Over the course of the following months he develops gastrointestinal distress.  He underwent cholecystectomy at [facility] in August 2020 with no relief.  He then had a syncopal episode later that fall and was found to be profoundly anemic.  He had an EGD and colonoscopy by Dr. Becerril and was diagnosed with ulcerative colitis in November 2020.  He was treated with steroids, mesalamine, and Humira.  He has been on and off of prednisone since.  He states so long as he is on prednisone he has 1-3 formed bowel movements daily, however anytime he comes off he goes up to 30 bowel movements daily.  His last flare started 6 to 8 weeks ago, he called Dr. Becerril's office and was placed on 20 mg of prednisone with a slow taper.  He is down to 10 mg daily and having 3-4 urgent bowel movements with some incontinence.  He does report mucus but no bleeding, he does occasionally have nocturnal diarrhea.  This spring he continued to have elevated liver enzymes, he underwent an evaluation for questionable IPMN versus PSC.  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  He was placed on azathioprine along with ursodiol.  Since this summer he continues to struggle with his GI complaints.  He is on Florastor daily.  He is taking mesalamine 2 capsules 3 times daily.  He is on Humira every other week.  He has seen no improvement in his symptoms since starting azathioprine this spring.  Unfortunately we have none of Dr. Becerril's records including his EGD, colonoscopy, pathology, liver biopsy, or imaging.  He is also undergoing neurologic work-up for tremor, however looking back this all seemed to start post viral, and has worsened with steroids.  His blood sugars have been labile along with his blood pressure.  Today he is not doing well and is here for a second opinion.  MB   1/11/2022 Bony presents for follow-up of ulcerative colitis, questionable history of primary sclerosing cholangitis, and C. difficile. Since his last visit he underwent colonoscopy in September by Dr. Willis with moderate pancolitis. He was transitioned off Humira and started on steroids and Inflectra. After receiving 2 of his induction doses he developed progressive diarrhea. He was tested and found to be C. difficile positive. He completed a course of vancomycin with recurrent symptoms and treated twice. He underwent his third infusion with a flare of severe diarrhea in the third week in December. He was admitted and found to be toxin negative but antigen and PCR positive. He underwent flexible sigmoidoscopy with no significant pseudomembranous colitis. His steroids were discontinued and he was placed on a vancomycin taper by Dr. Carrillo. Today his diarrhea is slowing down. Yesterday he states he had around 15 loose bowel movements. Today he has only had approximately 6. He is not taking Florastor. He has been treated with colestipol in the past but is not taking this recently. I was able to review his liver biopsy ordered by Dr. Becerril, it is unclear that he has PSC from his liver biopsy. He does agree to a second opinion with hepatology at [facility]. 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 He does agree to pursue Dificid plus or minus monoclonal antibody infusion if he has recurrence. He does agree to see infectious disease. We had a long discussion regarding dehydration. He does agree to fluids and repeat blood work this week. He understands that if he develops severe diarrhea abdominal cramping fever or distention that he is to proceed back to the emergency department. MB 1/25/2022 The patient presents today for follow-up of his ulcerative colitis and C. difficile with severe diarrhea.  Since our last visit, he has not been doing well.  He has been readmitted to the hospital secondary to diarrhea.  He was found to have severe C. difficile colitis.  He is having 10-20 bowel movements daily.  He denies any blood in his stool.  He denies any abdominal pain.  On flexible sigmoidoscopy, this was felt to be secondary to C. difficile.  He is tapering down his vancomycin, and he is taking colestipol twice a day.  We have discussed starting on cholestyramine 4 times a day and changing him to Dificid.  He does have a follow-up with infectious disease.  We are also going to check his labs today to make sure he is not dehydrated, and to make sure he does not need to be admitted for IV fluids.  At this point, we have discussed advancing his diet as tolerated.  Increasing his cholestyramine and adding Dificid.  Until the Dificid is improved, I do want him to take the vancomycin 4 times a day.  I have discussed this plan with the patient and his wife, and they are in agreement.  3/2/2022 Bony presents for follow up of UC and c. diff. SInce his last visit he did see ID. He never went on dificid and never had the monoclonal antibody as his repeat c. diff was negative, he completed the vanc taper and had the monoclonal ab infusion. He is doing better during the day, still loose urgent stools after meals. However at night he is having a BM every 45 minutes to an hour with tensmus. His last dose of inflectra 12/21 at 5mg/kg, that completed his induction. He is currently taking orally mesalamine 800mg TID. He is not taking the cholestyramine. His diarrhea at night is the worse. He doesn't feel like this c. diff diarrhea. Today he is still struggling with diarrhea at night and is due for infliximab. MB  This televisit was performed at the patient's home. 3/30/2022 Bony presents for follow-up of ulcerative colitis, C. difficile, reflux, diarrhea.  Since his last visit he continues to slowly improve.  He did follow-up with Dr. Landau with no indication of monoclonal antibody and repeat negative stool for C. difficile.  Currently he got his second maintenance dose of infliximab last week.  He is down to 4-5 loose bowel movements daily.  He is struggling with tenesmus but this is slowly improving.  He is almost done with his 8-week course of budesonide.  He is on cholestyramine twice daily, Florastor twice daily, and mesalamine 800 p.o. 3 times daily.  His reflux has been flaring since being off pantoprazole.  He does have some odynophagia.  I am concerned he may have a component of Candida.  He agrees to empiric treatment of Diflucan for 14 days.  His last EGD was in 2020 with reflux and gastritis by Dr. Becerril.  He is still awaiting his appointment with hepatology.  He is off azathioprine and Miles for now.  He is due for repeat blood work today.  I do want him to keep a stool study at home in case he has further symptoms of C. difficile as he lives an hour away.  We will see each other again after his next maintenance dose infusion and continue to monitor his symptoms closely.  MB   5/4/2022 Bony presents for follow up of UC, C. diff, reflux and diarrhea. He states last week he states he was feeling well. He was have a soft BM a few times a day. 6 days ago he developed acute urgent and watery diarrhea with a BM 20-30 times daily. He says he has incredible brain fog and incredible fatigue. He slept for 26 hours. He is up all night using the restroom. He is due for his next infliximab on 5/15. He denies any abdominal pain. He is very confused and is halucinating. Today he is very weak, he is down to 176lb. He is eating but not able to keep much down in regard to his diarrhea. MB 6/22/2022 Bony presents for follow-up of ulcerative colitis.  Since his last visit his repeat drug titers show elevated serum antibody and 0 serum drug level.  We increased his infliximab dose to 10 mg/kg and have scheduled this every 6 weeks.  He received a dose on May 25 and had also started a prednisone taper which she is finishing up this week down to 5 mg.  His flexible sigmoidoscopy in June shows moderate left-sided disease, his biopsies actually showed mildly active disease.  Today we have discussed his work-up.  He agrees to repeat his infliximab titers at trough to see if he has any further antibody development and to see if his serum drug level is appropriate at the higher dose.  If not he agrees to consider via Zaria Jules.  If so he agrees to give the infliximab a few more doses to see if things improve.  He can continues the mesalamine 3 times daily, the Florastor twice daily, and the cholestyramine twice daily.  MB 9/2/2022 Bony presents for follow-up of ulcerative colitis.  Since his last visit his repeat infliximab titers done June 29 at trough shows a low titer of antibody development and a low serum drug level even despite 10 mg/kg every 6-week dosing.  He did receive 2 doses at the high dose at the increased interval.  His repeat flexible sigmoidoscopy in June reveals left-sided moderate disease.  We made the decision to transition to Entyvio.  Since his last visit he has gotten 1 dose of Entyvio.  He did have significant arthralgias after the infusion however he also stopped his prednisone taper 2 days prior to starting the Entyvio.  He contacted me earlier this week with up to 10 watery bowel movements again daily.  We restarted a prednisone taper and his diarrhea is better today.  His arthralgias are also better.  His blood sugars remain labile however he has been managing this with insulin.  Today we have discussed his plan.  We will plan to proceed with induction of Entyvio.  I will see him after induction and check his titers.  If he does not have appropriate serum drug level would consider every 4 week dosing depending on his sequela.  MB

## 2022-09-07 ENCOUNTER — TELEPHONE ENCOUNTER (OUTPATIENT)
Dept: URBAN - NONMETROPOLITAN AREA CLINIC 1 | Facility: CLINIC | Age: 72
End: 2022-09-07

## 2022-09-07 ENCOUNTER — OFFICE VISIT (OUTPATIENT)
Dept: URBAN - NONMETROPOLITAN AREA CLINIC 1 | Facility: CLINIC | Age: 72
End: 2022-09-07
Payer: MEDICARE

## 2022-09-07 VITALS
HEIGHT: 72 IN | WEIGHT: 195 LBS | BODY MASS INDEX: 26.41 KG/M2 | SYSTOLIC BLOOD PRESSURE: 182 MMHG | DIASTOLIC BLOOD PRESSURE: 77 MMHG

## 2022-09-07 DIAGNOSIS — K51.00 ULCERATIVE PANCOLITIS: ICD-10-CM

## 2022-09-07 PROCEDURE — 96365 THER/PROPH/DIAG IV INF INIT: CPT | Performed by: INTERNAL MEDICINE

## 2022-09-07 RX ORDER — FAMOTIDINE 40 MG/1
TAKE 1 TABLET BY MOUTH TWICE A DAY TABLET, FILM COATED ORAL TWICE A DAY
Qty: 180 TABLET | Refills: 3 | Status: ACTIVE | COMMUNITY
Start: 2022-03-24

## 2022-09-07 RX ORDER — MESALAMINE 800 MG/1
1 PILL THREE TIMES DAILY TABLET, DELAYED RELEASE ORAL THREE TIMES A DAY
Qty: 90 | Refills: 11 | Status: ACTIVE | COMMUNITY

## 2022-09-07 RX ORDER — METRONIDAZOLE 500 MG/1
1 TABLET TABLET ORAL THREE TIMES A DAY
Qty: 30 TABLETS | Refills: 0 | Status: ACTIVE | COMMUNITY

## 2022-09-07 RX ORDER — PROPRANOLOL HYDROCHLORIDE 40 MG/1
AS DIRECTED TABLET ORAL
Status: ACTIVE | COMMUNITY

## 2022-09-07 RX ORDER — VEDOLIZUMAB 300 MG/5ML
AS DIRECTED INJECTION, POWDER, LYOPHILIZED, FOR SOLUTION INTRAVENOUS
Qty: 300 MILLIGRAMS | Refills: 0 | Status: ACTIVE | COMMUNITY
Start: 2022-07-12 | End: 2022-10-10

## 2022-09-07 RX ORDER — PREDNISONE 10 MG/1
4 TABLET DAILY BY MOUTH X 3 DAYS, 3 TAB DAILY X 3 DAYS, 2 TAB DAILY X 3 DAYS, 1 TAB DAILY X 3 DAYS TABLET ORAL AS DIRECTED
Qty: 30 TABLET | Refills: 0 | Status: ACTIVE | COMMUNITY
Start: 2022-09-01 | End: 2022-09-13

## 2022-09-07 RX ORDER — BUDESONIDE 3 MG/1
3 CAP PO DAILY CAPSULE, COATED PELLETS ORAL ONCE A DAY
Qty: 90 CAPSULE | Refills: 1 | Status: ACTIVE | COMMUNITY

## 2022-09-07 RX ORDER — FAMOTIDINE 40 MG/1
TAKE 1 TABLET BY MOUTH TWICE A DAY TABLET, FILM COATED ORAL TWICE A DAY
Qty: 180 TABLET | Refills: 3 | Status: ACTIVE | COMMUNITY
Start: 2022-01-11

## 2022-09-07 RX ORDER — HYOSCYAMINE SULFATE 0.12 MG/1
1 TABLET UNDER THE TONGUE AND ALLOW TO DISSOLVE  AS NEEDED TABLET SUBLINGUAL
Qty: 60 TABLET | Refills: 11 | Status: ACTIVE | COMMUNITY
Start: 2022-03-30 | End: 2023-03-25

## 2022-09-10 LAB
A/G RATIO: 1.1
ABSOLUTE BASOPHILS: 21
ABSOLUTE EOSINOPHILS: 0
ABSOLUTE LYMPHOCYTES: 880
ABSOLUTE MONOCYTES: 297
ABSOLUTE NEUTROPHILS: 9402
ALBUMIN: 3.8
ALKALINE PHOSPHATASE: 123
ALT (SGPT): 20
AST (SGOT): 17
BASOPHILS: 0.2
BILIRUBIN, TOTAL: 0.3
BUN/CREATININE RATIO: 11
BUN: 16
C-REACTIVE PROTEIN, QUANT: 26.6
CALCIUM: 9.5
CARBON DIOXIDE, TOTAL: 21
CHLORIDE: 102
CREATININE: 1.43
EGFR: 52
EOSINOPHILS: 0
GLOBULIN, TOTAL: 3.6
GLUCOSE: 280
HEMATOCRIT: 35.9
HEMOGLOBIN: 11.9
LYMPHOCYTES: 8.3
MCH: 27.2
MCHC: 33.1
MCV: 82
MONOCYTES: 2.8
MPV: 11.4
NEUTROPHILS: 88.7
PLATELET COUNT: 379
POTASSIUM: 4.5
PROTEIN, TOTAL: 7.4
RDW: 13
RED BLOOD CELL COUNT: 4.38
SED RATE BY MODIFIED: 55
SODIUM: 135
VEDOLIZUMAB AB, S: <9.8
VEDOLIZUMAB QN, S: 29.7
VEMAB INTERPRETATION: (no result)
WHITE BLOOD CELL COUNT: 10.6

## 2022-09-14 ENCOUNTER — TELEPHONE ENCOUNTER (OUTPATIENT)
Dept: URBAN - METROPOLITAN AREA CLINIC 92 | Facility: CLINIC | Age: 72
End: 2022-09-14

## 2022-09-19 ENCOUNTER — WEB ENCOUNTER (OUTPATIENT)
Dept: URBAN - NONMETROPOLITAN AREA CLINIC 2 | Facility: CLINIC | Age: 72
End: 2022-09-19

## 2022-09-20 ENCOUNTER — OFFICE VISIT (OUTPATIENT)
Dept: URBAN - NONMETROPOLITAN AREA CLINIC 1 | Facility: CLINIC | Age: 72
End: 2022-09-20

## 2022-09-20 ENCOUNTER — OFFICE VISIT (OUTPATIENT)
Dept: URBAN - NONMETROPOLITAN AREA CLINIC 2 | Facility: CLINIC | Age: 72
End: 2022-09-20
Payer: MEDICARE

## 2022-09-20 ENCOUNTER — TELEPHONE ENCOUNTER (OUTPATIENT)
Dept: URBAN - METROPOLITAN AREA CLINIC 92 | Facility: CLINIC | Age: 72
End: 2022-09-20

## 2022-09-20 VITALS
HEIGHT: 72 IN | BODY MASS INDEX: 26.01 KG/M2 | SYSTOLIC BLOOD PRESSURE: 95 MMHG | WEIGHT: 192 LBS | TEMPERATURE: 97.5 F | DIASTOLIC BLOOD PRESSURE: 63 MMHG | HEART RATE: 68 BPM

## 2022-09-20 DIAGNOSIS — A04.72 CLOSTRIDIUM DIFFICILE DIARRHEA: ICD-10-CM

## 2022-09-20 DIAGNOSIS — K83.01 PSC (PRIMARY SCLEROSING CHOLANGITIS): ICD-10-CM

## 2022-09-20 DIAGNOSIS — R19.7 DIARRHEA, UNSPECIFIED TYPE: ICD-10-CM

## 2022-09-20 DIAGNOSIS — K51.018 ULCERATIVE PANCOLITIS WITH OTHER COMPLICATION: ICD-10-CM

## 2022-09-20 DIAGNOSIS — Z12.11 COLON CANCER SCREENING: ICD-10-CM

## 2022-09-20 DIAGNOSIS — K21.9 GASTROESOPHAGEAL REFLUX DISEASE, UNSPECIFIED WHETHER ESOPHAGITIS PRESENT: ICD-10-CM

## 2022-09-20 DIAGNOSIS — K51.00 ULCERATIVE PANCOLITIS: ICD-10-CM

## 2022-09-20 PROCEDURE — 99214 OFFICE O/P EST MOD 30 MIN: CPT | Performed by: INTERNAL MEDICINE

## 2022-09-20 RX ORDER — PREDNISONE 10 MG/1
4 TAB PO X 7D, 3 TAB PO X 7D 2 TAB PO X 7 DAYS 1TAB PO X 7DAYS TABLET ORAL ONCE A DAY
Qty: 70 TABLET | Refills: 0 | OUTPATIENT
Start: 2022-09-20 | End: 2022-10-18

## 2022-09-20 RX ORDER — MESALAMINE 800 MG/1
1 PILL THREE TIMES DAILY TABLET, DELAYED RELEASE ORAL THREE TIMES A DAY
Qty: 90 | Refills: 11 | Status: ACTIVE | COMMUNITY

## 2022-09-20 RX ORDER — METRONIDAZOLE 500 MG/1
1 TABLET TABLET ORAL THREE TIMES A DAY
Qty: 30 TABLETS | Refills: 0 | Status: ACTIVE | COMMUNITY

## 2022-09-20 RX ORDER — UPADACITINIB 45 MG/1
1 TABLET TABLET, EXTENDED RELEASE ORAL ONCE A DAY
Qty: 28 TABLET | Refills: 1 | OUTPATIENT
Start: 2022-09-20 | End: 2022-11-14

## 2022-09-20 RX ORDER — FAMOTIDINE 40 MG/1
TAKE 1 TABLET BY MOUTH TWICE A DAY TABLET, FILM COATED ORAL TWICE A DAY
Qty: 180 TABLET | Refills: 3 | Status: ACTIVE | COMMUNITY
Start: 2022-03-24

## 2022-09-20 RX ORDER — HYOSCYAMINE SULFATE 0.12 MG/1
1 TABLET UNDER THE TONGUE AND ALLOW TO DISSOLVE  AS NEEDED TABLET SUBLINGUAL
Qty: 60 TABLET | Refills: 11 | Status: ACTIVE | COMMUNITY
Start: 2022-03-30 | End: 2023-03-25

## 2022-09-20 RX ORDER — VEDOLIZUMAB 300 MG/5ML
AS DIRECTED INJECTION, POWDER, LYOPHILIZED, FOR SOLUTION INTRAVENOUS
Qty: 300 MILLIGRAMS | Refills: 0 | Status: ACTIVE | COMMUNITY
Start: 2022-07-12 | End: 2022-10-10

## 2022-09-20 RX ORDER — PROPRANOLOL HYDROCHLORIDE 40 MG/1
AS DIRECTED TABLET ORAL
Status: ACTIVE | COMMUNITY

## 2022-09-20 RX ORDER — FAMOTIDINE 40 MG/1
TAKE 1 TABLET BY MOUTH TWICE A DAY TABLET, FILM COATED ORAL TWICE A DAY
Qty: 180 TABLET | Refills: 3 | Status: ACTIVE | COMMUNITY
Start: 2022-01-11

## 2022-09-20 RX ORDER — BUDESONIDE 3 MG/1
3 CAP PO DAILY CAPSULE, COATED PELLETS ORAL ONCE A DAY
Qty: 90 CAPSULE | Refills: 1 | Status: ACTIVE | COMMUNITY

## 2022-09-20 RX ORDER — UPADACITINIB 15 MG/1
1 TABLET TABLET, EXTENDED RELEASE ORAL ONCE A DAY
Qty: 30 TABLET | Refills: 11 | OUTPATIENT
Start: 2022-09-20 | End: 2023-09-15

## 2022-09-20 NOTE — HPI-TODAY'S VISIT:
Mr. Pavon is a 71-year-old male who is referred by Dr. Justin Hinson for consultation of ulcerative colitis, PSC, and diarrhea.  A copy of this note will be sent to the referring physician.  He states all of his issues began in January 2020.  He developed a viral upper respiratory disease which was likely Covid at the time but not diagnosed.  He has had a downhill drop of his health since.  Over the course of the following months he develops gastrointestinal distress.  He underwent cholecystectomy at [facility] in August 2020 with no relief.  He then had a syncopal episode later that fall and was found to be profoundly anemic.  He had an EGD and colonoscopy by Dr. Becerril and was diagnosed with ulcerative colitis in November 2020.  He was treated with steroids, mesalamine, and Humira.  He has been on and off of prednisone since.  He states so long as he is on prednisone he has 1-3 formed bowel movements daily, however anytime he comes off he goes up to 30 bowel movements daily.  His last flare started 6 to 8 weeks ago, he called Dr. Becerril's office and was placed on 20 mg of prednisone with a slow taper.  He is down to 10 mg daily and having 3-4 urgent bowel movements with some incontinence.  He does report mucus but no bleeding, he does occasionally have nocturnal diarrhea.  This spring he continued to have elevated liver enzymes, he underwent an evaluation for questionable IPMN versus PSC.  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  He was placed on azathioprine along with ursodiol.  Since this summer he continues to struggle with his GI complaints.  He is on Florastor daily.  He is taking mesalamine 2 capsules 3 times daily.  He is on Humira every other week.  He has seen no improvement in his symptoms since starting azathioprine this spring.  Unfortunately we have none of Dr. Becerril's records including his EGD, colonoscopy, pathology, liver biopsy, or imaging.  He is also undergoing neurologic work-up for tremor, however looking back this all seemed to start post viral, and has worsened with steroids.  His blood sugars have been labile along with his blood pressure.  Today he is not doing well and is here for a second opinion.  MB   1/11/2022 Bony presents for follow-up of ulcerative colitis, questionable history of primary sclerosing cholangitis, and C. difficile. Since his last visit he underwent colonoscopy in September by Dr. Willis with moderate pancolitis. He was transitioned off Humira and started on steroids and Inflectra. After receiving 2 of his induction doses he developed progressive diarrhea. He was tested and found to be C. difficile positive. He completed a course of vancomycin with recurrent symptoms and treated twice. He underwent his third infusion with a flare of severe diarrhea in the third week in December. He was admitted and found to be toxin negative but antigen and PCR positive. He underwent flexible sigmoidoscopy with no significant pseudomembranous colitis. His steroids were discontinued and he was placed on a vancomycin taper by Dr. Carrillo. Today his diarrhea is slowing down. Yesterday he states he had around 15 loose bowel movements. Today he has only had approximately 6. He is not taking Florastor. He has been treated with colestipol in the past but is not taking this recently. I was able to review his liver biopsy ordered by Dr. Becerril, it is unclear that he has PSC from his liver biopsy. He does agree to a second opinion with hepatology at [facility]. 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 He does agree to pursue Dificid plus or minus monoclonal antibody infusion if he has recurrence. He does agree to see infectious disease. We had a long discussion regarding dehydration. He does agree to fluids and repeat blood work this week. He understands that if he develops severe diarrhea abdominal cramping fever or distention that he is to proceed back to the emergency department. MB 1/25/2022 The patient presents today for follow-up of his ulcerative colitis and C. difficile with severe diarrhea.  Since our last visit, he has not been doing well.  He has been readmitted to the hospital secondary to diarrhea.  He was found to have severe C. difficile colitis.  He is having 10-20 bowel movements daily.  He denies any blood in his stool.  He denies any abdominal pain.  On flexible sigmoidoscopy, this was felt to be secondary to C. difficile.  He is tapering down his vancomycin, and he is taking colestipol twice a day.  We have discussed starting on cholestyramine 4 times a day and changing him to Dificid.  He does have a follow-up with infectious disease.  We are also going to check his labs today to make sure he is not dehydrated, and to make sure he does not need to be admitted for IV fluids.  At this point, we have discussed advancing his diet as tolerated.  Increasing his cholestyramine and adding Dificid.  Until the Dificid is improved, I do want him to take the vancomycin 4 times a day.  I have discussed this plan with the patient and his wife, and they are in agreement.  3/2/2022 Bony presents for follow up of UC and c. diff. SInce his last visit he did see ID. He never went on dificid and never had the monoclonal antibody as his repeat c. diff was negative, he completed the vanc taper and had the monoclonal ab infusion. He is doing better during the day, still loose urgent stools after meals. However at night he is having a BM every 45 minutes to an hour with tensmus. His last dose of inflectra 12/21 at 5mg/kg, that completed his induction. He is currently taking orally mesalamine 800mg TID. He is not taking the cholestyramine. His diarrhea at night is the worse. He doesn't feel like this c. diff diarrhea. Today he is still struggling with diarrhea at night and is due for infliximab. MB  This televisit was performed at the patient's home. 3/30/2022 Bony presents for follow-up of ulcerative colitis, C. difficile, reflux, diarrhea.  Since his last visit he continues to slowly improve.  He did follow-up with Dr. Landau with no indication of monoclonal antibody and repeat negative stool for C. difficile.  Currently he got his second maintenance dose of infliximab last week.  He is down to 4-5 loose bowel movements daily.  He is struggling with tenesmus but this is slowly improving.  He is almost done with his 8-week course of budesonide.  He is on cholestyramine twice daily, Florastor twice daily, and mesalamine 800 p.o. 3 times daily.  His reflux has been flaring since being off pantoprazole.  He does have some odynophagia.  I am concerned he may have a component of Candida.  He agrees to empiric treatment of Diflucan for 14 days.  His last EGD was in 2020 with reflux and gastritis by Dr. Becerril.  He is still awaiting his appointment with hepatology.  He is off azathioprine and Miles for now.  He is due for repeat blood work today.  I do want him to keep a stool study at home in case he has further symptoms of C. difficile as he lives an hour away.  We will see each other again after his next maintenance dose infusion and continue to monitor his symptoms closely.  MB   5/4/2022 Bony presents for follow up of UC, C. diff, reflux and diarrhea. He states last week he states he was feeling well. He was have a soft BM a few times a day. 6 days ago he developed acute urgent and watery diarrhea with a BM 20-30 times daily. He says he has incredible brain fog and incredible fatigue. He slept for 26 hours. He is up all night using the restroom. He is due for his next infliximab on 5/15. He denies any abdominal pain. He is very confused and is halucinating. Today he is very weak, he is down to 176lb. He is eating but not able to keep much down in regard to his diarrhea. MB 6/22/2022 Bony presents for follow-up of ulcerative colitis.  Since his last visit his repeat drug titers show elevated serum antibody and 0 serum drug level.  We increased his infliximab dose to 10 mg/kg and have scheduled this every 6 weeks.  He received a dose on May 25 and had also started a prednisone taper which she is finishing up this week down to 5 mg.  His flexible sigmoidoscopy in June shows moderate left-sided disease, his biopsies actually showed mildly active disease.  Today we have discussed his work-up.  He agrees to repeat his infliximab titers at trough to see if he has any further antibody development and to see if his serum drug level is appropriate at the higher dose.  If not he agrees to consider via Zaria Jules.  If so he agrees to give the infliximab a few more doses to see if things improve.  He can continues the mesalamine 3 times daily, the Florastor twice daily, and the cholestyramine twice daily.  MB 9/2/2022 Bony presents for follow-up of ulcerative colitis.  Since his last visit his repeat infliximab titers done June 29 at trough shows a low titer of antibody development and a low serum drug level even despite 10 mg/kg every 6-week dosing.  He did receive 2 doses at the high dose at the increased interval.  His repeat flexible sigmoidoscopy in June reveals left-sided moderate disease.  We made the decision to transition to Entyvio.  Since his last visit he has gotten 1 dose of Entyvio.  He did have significant arthralgias after the infusion however he also stopped his prednisone taper 2 days prior to starting the Entyvio.  He contacted me earlier this week with up to 10 watery bowel movements again daily.  We restarted a prednisone taper and his diarrhea is better today.  His arthralgias are also better.  His blood sugars remain labile however he has been managing this with insulin.  Today we have discussed his plan.  We will plan to proceed with induction of Entyvio.  I will see him after induction and check his titers.  If he does not have appropriate serum drug level would consider every 4 week dosing depending on his sequela.  MB 9/20/2022 The patient presents today for follow-up of his ulcerative colitis.  Since her last visit, we switched him from infliximab to Entyvio.  He has gotten 2 doses of Entyvio.  After both of his doses, he developed significant arthralgias.  Also, over the past 5 days, he has been off prednisone.  He is having up to 20 loose watery bowel movements daily.  He feels extremely weak and lethargic.  On his last colonoscopy, he was found to have active colitis.  At this point, we have discussed several options.  We have discussed proceeding with a Yuan inhibitor with Rinvoq.  We are going to try to get this approved through his insurance.  In the meantime, I am going to give him another prednisone taper.  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  They could be a component of coming off prednisone.  He also feels that he has long COVID, and he is wondering if this is contributing to his symptoms as well.  Fortunately, his labs are fairly normal other than his creatinine has been mildly elevated.  Today, we are going to switch him from Entyvio to Rinvoq and proceed with prednisone taper.  We have discussed the other option would possibly be Stelara.

## 2022-10-07 ENCOUNTER — P2P PATIENT RECORD (OUTPATIENT)
Age: 72
End: 2022-10-07

## 2022-10-10 ENCOUNTER — WEB ENCOUNTER (OUTPATIENT)
Dept: URBAN - NONMETROPOLITAN AREA CLINIC 2 | Facility: CLINIC | Age: 72
End: 2022-10-10

## 2022-10-11 ENCOUNTER — OFFICE VISIT (OUTPATIENT)
Dept: URBAN - NONMETROPOLITAN AREA CLINIC 1 | Facility: CLINIC | Age: 72
End: 2022-10-11

## 2022-10-12 ENCOUNTER — OFFICE VISIT (OUTPATIENT)
Dept: URBAN - NONMETROPOLITAN AREA CLINIC 1 | Facility: CLINIC | Age: 72
End: 2022-10-12

## 2022-10-14 ENCOUNTER — WEB ENCOUNTER (OUTPATIENT)
Dept: URBAN - NONMETROPOLITAN AREA CLINIC 2 | Facility: CLINIC | Age: 72
End: 2022-10-14

## 2022-10-17 ENCOUNTER — WEB ENCOUNTER (OUTPATIENT)
Dept: URBAN - NONMETROPOLITAN AREA CLINIC 2 | Facility: CLINIC | Age: 72
End: 2022-10-17

## 2022-10-19 ENCOUNTER — WEB ENCOUNTER (OUTPATIENT)
Dept: URBAN - NONMETROPOLITAN AREA CLINIC 2 | Facility: CLINIC | Age: 72
End: 2022-10-19

## 2022-10-19 RX ORDER — FAMOTIDINE 40 MG/1
TAKE 1 TABLET BY MOUTH TWICE A DAY TABLET, FILM COATED ORAL TWICE A DAY
Qty: 180 TABLET | Refills: 3 | Status: ACTIVE | COMMUNITY
Start: 2022-03-24

## 2022-10-19 RX ORDER — PREDNISONE 10 MG/1
4 TAB BY MOUTH DAILY X 14 DAYS, 3 TAB DAILY X 7 DAYS, 2 TAB DAILY X 7 DAYS, 1 TAB DAILY X 7 DAYS, 1/2 TAB DAILY X 7-14 DAYS TABLET ORAL AS DIRECTED
Qty: 105 TABLET | Refills: 0 | OUTPATIENT
Start: 2022-10-19 | End: 2022-12-06

## 2022-10-19 RX ORDER — UPADACITINIB 45 MG/1
1 TABLET TABLET, EXTENDED RELEASE ORAL ONCE A DAY
Qty: 28 TABLET | Refills: 1 | Status: ACTIVE | COMMUNITY
Start: 2022-09-20 | End: 2022-11-14

## 2022-10-19 RX ORDER — METRONIDAZOLE 500 MG/1
1 TABLET TABLET ORAL THREE TIMES A DAY
Qty: 30 TABLETS | Refills: 0 | Status: ACTIVE | COMMUNITY

## 2022-10-19 RX ORDER — MESALAMINE 800 MG/1
1 PILL THREE TIMES DAILY TABLET, DELAYED RELEASE ORAL THREE TIMES A DAY
Qty: 90 | Refills: 11 | Status: ACTIVE | COMMUNITY

## 2022-10-19 RX ORDER — FAMOTIDINE 40 MG/1
TAKE 1 TABLET BY MOUTH TWICE A DAY TABLET, FILM COATED ORAL TWICE A DAY
Qty: 180 TABLET | Refills: 3 | Status: ACTIVE | COMMUNITY
Start: 2022-01-11

## 2022-10-19 RX ORDER — HYOSCYAMINE SULFATE 0.12 MG/1
1 TABLET UNDER THE TONGUE AND ALLOW TO DISSOLVE  AS NEEDED TABLET SUBLINGUAL
Qty: 60 TABLET | Refills: 11 | Status: ACTIVE | COMMUNITY
Start: 2022-03-30 | End: 2023-03-25

## 2022-10-19 RX ORDER — UPADACITINIB 15 MG/1
1 TABLET TABLET, EXTENDED RELEASE ORAL ONCE A DAY
Qty: 30 TABLET | Refills: 11 | Status: ACTIVE | COMMUNITY
Start: 2022-09-20 | End: 2023-09-15

## 2022-10-19 RX ORDER — BUDESONIDE 3 MG/1
3 CAP PO DAILY CAPSULE, COATED PELLETS ORAL ONCE A DAY
Qty: 90 CAPSULE | Refills: 1 | Status: ACTIVE | COMMUNITY

## 2022-10-19 RX ORDER — PROPRANOLOL HYDROCHLORIDE 40 MG/1
AS DIRECTED TABLET ORAL
Status: ACTIVE | COMMUNITY

## 2022-10-20 ENCOUNTER — WEB ENCOUNTER (OUTPATIENT)
Dept: URBAN - NONMETROPOLITAN AREA CLINIC 2 | Facility: CLINIC | Age: 72
End: 2022-10-20

## 2022-10-20 RX ORDER — UPADACITINIB 15 MG/1
1 TABLET TABLET, EXTENDED RELEASE ORAL ONCE A DAY
Qty: 30 TABLET | Refills: 11
Start: 2022-09-20 | End: 2023-10-21

## 2022-10-20 RX ORDER — UPADACITINIB 45 MG/1
1 TABLET TABLET, EXTENDED RELEASE ORAL ONCE A DAY
Qty: 28 TABLET | Refills: 1
Start: 2022-09-20 | End: 2022-12-21

## 2022-10-24 ENCOUNTER — WEB ENCOUNTER (OUTPATIENT)
Dept: URBAN - NONMETROPOLITAN AREA CLINIC 2 | Facility: CLINIC | Age: 72
End: 2022-10-24

## 2022-10-25 ENCOUNTER — OFFICE VISIT (OUTPATIENT)
Dept: URBAN - NONMETROPOLITAN AREA CLINIC 2 | Facility: CLINIC | Age: 72
End: 2022-10-25

## 2022-10-25 ENCOUNTER — WEB ENCOUNTER (OUTPATIENT)
Dept: URBAN - NONMETROPOLITAN AREA CLINIC 2 | Facility: CLINIC | Age: 72
End: 2022-10-25

## 2022-11-07 ENCOUNTER — WEB ENCOUNTER (OUTPATIENT)
Dept: URBAN - NONMETROPOLITAN AREA CLINIC 2 | Facility: CLINIC | Age: 72
End: 2022-11-07

## 2022-11-08 ENCOUNTER — WEB ENCOUNTER (OUTPATIENT)
Dept: URBAN - NONMETROPOLITAN AREA CLINIC 2 | Facility: CLINIC | Age: 72
End: 2022-11-08

## 2022-11-11 ENCOUNTER — WEB ENCOUNTER (OUTPATIENT)
Dept: URBAN - NONMETROPOLITAN AREA CLINIC 2 | Facility: CLINIC | Age: 72
End: 2022-11-11

## 2022-11-16 ENCOUNTER — OFFICE VISIT (OUTPATIENT)
Dept: URBAN - METROPOLITAN AREA TELEHEALTH 2 | Facility: TELEHEALTH | Age: 72
End: 2022-11-16
Payer: MEDICARE

## 2022-11-16 DIAGNOSIS — R19.4 ALTERATION IN BOWEL ELIMINATION: ICD-10-CM

## 2022-11-16 DIAGNOSIS — K83.01 PSC (PRIMARY SCLEROSING CHOLANGITIS): ICD-10-CM

## 2022-11-16 DIAGNOSIS — K21.9 GASTROESOPHAGEAL REFLUX DISEASE, UNSPECIFIED WHETHER ESOPHAGITIS PRESENT: ICD-10-CM

## 2022-11-16 DIAGNOSIS — K51.018 ULCERATIVE PANCOLITIS WITH OTHER COMPLICATION: ICD-10-CM

## 2022-11-16 DIAGNOSIS — Z12.11 COLON CANCER SCREENING: ICD-10-CM

## 2022-11-16 DIAGNOSIS — A04.72 CLOSTRIDIUM DIFFICILE DIARRHEA: ICD-10-CM

## 2022-11-16 PROCEDURE — 99214 OFFICE O/P EST MOD 30 MIN: CPT | Performed by: NURSE PRACTITIONER

## 2022-11-16 RX ORDER — BUDESONIDE 3 MG/1
3 CAP PO DAILY CAPSULE, COATED PELLETS ORAL ONCE A DAY
Qty: 90 CAPSULE | Refills: 1 | Status: ACTIVE | COMMUNITY

## 2022-11-16 RX ORDER — PROPRANOLOL HYDROCHLORIDE 40 MG/1
AS DIRECTED TABLET ORAL
Status: ACTIVE | COMMUNITY

## 2022-11-16 RX ORDER — UPADACITINIB 45 MG/1
1 TABLET TABLET, EXTENDED RELEASE ORAL ONCE A DAY
Qty: 28 TABLET | Refills: 1 | Status: ACTIVE | COMMUNITY
Start: 2022-09-20 | End: 2022-12-21

## 2022-11-16 RX ORDER — FAMOTIDINE 40 MG/1
TAKE 1 TABLET BY MOUTH TWICE A DAY TABLET, FILM COATED ORAL TWICE A DAY
Qty: 180 TABLET | Refills: 3 | Status: ACTIVE | COMMUNITY
Start: 2022-03-24

## 2022-11-16 RX ORDER — PREDNISONE 10 MG/1
4 TAB BY MOUTH DAILY X 14 DAYS, 3 TAB DAILY X 7 DAYS, 2 TAB DAILY X 7 DAYS, 1 TAB DAILY X 7 DAYS, 1/2 TAB DAILY X 7-14 DAYS TABLET ORAL AS DIRECTED
Qty: 105 TABLET | Refills: 0 | Status: ACTIVE | COMMUNITY
Start: 2022-10-19 | End: 2022-12-06

## 2022-11-16 RX ORDER — MESALAMINE 800 MG/1
1 PILL THREE TIMES DAILY TABLET, DELAYED RELEASE ORAL THREE TIMES A DAY
Qty: 90 | Refills: 11 | Status: ACTIVE | COMMUNITY

## 2022-11-16 RX ORDER — FAMOTIDINE 40 MG/1
TAKE 1 TABLET BY MOUTH TWICE A DAY TABLET, FILM COATED ORAL TWICE A DAY
Qty: 180 TABLET | Refills: 3 | Status: ACTIVE | COMMUNITY
Start: 2022-01-11

## 2022-11-16 RX ORDER — UPADACITINIB 15 MG/1
1 TABLET TABLET, EXTENDED RELEASE ORAL ONCE A DAY
Qty: 30 TABLET | Refills: 11 | Status: ACTIVE | COMMUNITY
Start: 2022-09-20 | End: 2023-10-21

## 2022-11-16 RX ORDER — HYOSCYAMINE SULFATE 0.12 MG/1
1 TABLET UNDER THE TONGUE AND ALLOW TO DISSOLVE  AS NEEDED TABLET SUBLINGUAL
Qty: 60 TABLET | Refills: 11 | Status: ACTIVE | COMMUNITY
Start: 2022-03-30 | End: 2023-03-25

## 2022-11-16 RX ORDER — METRONIDAZOLE 500 MG/1
1 TABLET TABLET ORAL THREE TIMES A DAY
Qty: 30 TABLETS | Refills: 0 | Status: ACTIVE | COMMUNITY

## 2022-11-16 NOTE — HPI-TODAY'S VISIT:
Mr. Pavon is a 71-year-old male who is referred by Dr. Justin Hinson for consultation of ulcerative colitis, PSC, and diarrhea.  A copy of this note will be sent to the referring physician.  He states all of his issues began in January 2020.  He developed a viral upper respiratory disease which was likely Covid at the time but not diagnosed.  He has had a downhill drop of his health since.  Over the course of the following months he develops gastrointestinal distress.  He underwent cholecystectomy at [facility] in August 2020 with no relief.  He then had a syncopal episode later that fall and was found to be profoundly anemic.  He had an EGD and colonoscopy by Dr. Becerril and was diagnosed with ulcerative colitis in November 2020.  He was treated with steroids, mesalamine, and Humira.  He has been on and off of prednisone since.  He states so long as he is on prednisone he has 1-3 formed bowel movements daily, however anytime he comes off he goes up to 30 bowel movements daily.  His last flare started 6 to 8 weeks ago, he called Dr. Becerril's office and was placed on 20 mg of prednisone with a slow taper.  He is down to 10 mg daily and having 3-4 urgent bowel movements with some incontinence.  He does report mucus but no bleeding, he does occasionally have nocturnal diarrhea.  This spring he continued to have elevated liver enzymes, he underwent an evaluation for questionable IPMN versus PSC.  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  He was placed on azathioprine along with ursodiol.  Since this summer he continues to struggle with his GI complaints.  He is on Florastor daily.  He is taking mesalamine 2 capsules 3 times daily.  He is on Humira every other week.  He has seen no improvement in his symptoms since starting azathioprine this spring.  Unfortunately we have none of Dr. Becerril's records including his EGD, colonoscopy, pathology, liver biopsy, or imaging.  He is also undergoing neurologic work-up for tremor, however looking back this all seemed to start post viral, and has worsened with steroids.  His blood sugars have been labile along with his blood pressure.  Today he is not doing well and is here for a second opinion.  MB   1/11/2022 Bony presents for follow-up of ulcerative colitis, questionable history of primary sclerosing cholangitis, and C. difficile. Since his last visit he underwent colonoscopy in September by Dr. Willis with moderate pancolitis. He was transitioned off Humira and started on steroids and Inflectra. After receiving 2 of his induction doses he developed progressive diarrhea. He was tested and found to be C. difficile positive. He completed a course of vancomycin with recurrent symptoms and treated twice. He underwent his third infusion with a flare of severe diarrhea in the third week in December. He was admitted and found to be toxin negative but antigen and PCR positive. He underwent flexible sigmoidoscopy with no significant pseudomembranous colitis. His steroids were discontinued and he was placed on a vancomycin taper by Dr. Carrillo. Today his diarrhea is slowing down. Yesterday he states he had around 15 loose bowel movements. Today he has only had approximately 6. He is not taking Florastor. He has been treated with colestipol in the past but is not taking this recently. I was able to review his liver biopsy ordered by Dr. Becerril, it is unclear that he has PSC from his liver biopsy. He does agree to a second opinion with hepatology at [facility]. 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 He does agree to pursue Dificid plus or minus monoclonal antibody infusion if he has recurrence. He does agree to see infectious disease. We had a long discussion regarding dehydration. He does agree to fluids and repeat blood work this week. He understands that if he develops severe diarrhea abdominal cramping fever or distention that he is to proceed back to the emergency department. MB 1/25/2022 The patient presents today for follow-up of his ulcerative colitis and C. difficile with severe diarrhea.  Since our last visit, he has not been doing well.  He has been readmitted to the hospital secondary to diarrhea.  He was found to have severe C. difficile colitis.  He is having 10-20 bowel movements daily.  He denies any blood in his stool.  He denies any abdominal pain.  On flexible sigmoidoscopy, this was felt to be secondary to C. difficile.  He is tapering down his vancomycin, and he is taking colestipol twice a day.  We have discussed starting on cholestyramine 4 times a day and changing him to Dificid.  He does have a follow-up with infectious disease.  We are also going to check his labs today to make sure he is not dehydrated, and to make sure he does not need to be admitted for IV fluids.  At this point, we have discussed advancing his diet as tolerated.  Increasing his cholestyramine and adding Dificid.  Until the Dificid is improved, I do want him to take the vancomycin 4 times a day.  I have discussed this plan with the patient and his wife, and they are in agreement.  3/2/2022 Bony presents for follow up of UC and c. diff. SInce his last visit he did see ID. He never went on dificid and never had the monoclonal antibody as his repeat c. diff was negative, he completed the vanc taper and had the monoclonal ab infusion. He is doing better during the day, still loose urgent stools after meals. However at night he is having a BM every 45 minutes to an hour with tensmus. His last dose of inflectra 12/21 at 5mg/kg, that completed his induction. He is currently taking orally mesalamine 800mg TID. He is not taking the cholestyramine. His diarrhea at night is the worse. He doesn't feel like this c. diff diarrhea. Today he is still struggling with diarrhea at night and is due for infliximab. MB  This televisit was performed at the patient's home. 3/30/2022 Bony presents for follow-up of ulcerative colitis, C. difficile, reflux, diarrhea.  Since his last visit he continues to slowly improve.  He did follow-up with Dr. Landau with no indication of monoclonal antibody and repeat negative stool for C. difficile.  Currently he got his second maintenance dose of infliximab last week.  He is down to 4-5 loose bowel movements daily.  He is struggling with tenesmus but this is slowly improving.  He is almost done with his 8-week course of budesonide.  He is on cholestyramine twice daily, Florastor twice daily, and mesalamine 800 p.o. 3 times daily.  His reflux has been flaring since being off pantoprazole.  He does have some odynophagia.  I am concerned he may have a component of Candida.  He agrees to empiric treatment of Diflucan for 14 days.  His last EGD was in 2020 with reflux and gastritis by Dr. Becerril.  He is still awaiting his appointment with hepatology.  He is off azathioprine and Miles for now.  He is due for repeat blood work today.  I do want him to keep a stool study at home in case he has further symptoms of C. difficile as he lives an hour away.  We will see each other again after his next maintenance dose infusion and continue to monitor his symptoms closely.  MB   5/4/2022 Bony presents for follow up of UC, C. diff, reflux and diarrhea. He states last week he states he was feeling well. He was have a soft BM a few times a day. 6 days ago he developed acute urgent and watery diarrhea with a BM 20-30 times daily. He says he has incredible brain fog and incredible fatigue. He slept for 26 hours. He is up all night using the restroom. He is due for his next infliximab on 5/15. He denies any abdominal pain. He is very confused and is halucinating. Today he is very weak, he is down to 176lb. He is eating but not able to keep much down in regard to his diarrhea. MB 6/22/2022 Bony presents for follow-up of ulcerative colitis.  Since his last visit his repeat drug titers show elevated serum antibody and 0 serum drug level.  We increased his infliximab dose to 10 mg/kg and have scheduled this every 6 weeks.  He received a dose on May 25 and had also started a prednisone taper which she is finishing up this week down to 5 mg.  His flexible sigmoidoscopy in June shows moderate left-sided disease, his biopsies actually showed mildly active disease.  Today we have discussed his work-up.  He agrees to repeat his infliximab titers at trough to see if he has any further antibody development and to see if his serum drug level is appropriate at the higher dose.  If not he agrees to consider via [facility] Jules.  If so he agrees to give the infliximab a few more doses to see if things improve.  He can continues the mesalamine 3 times daily, the Florastor twice daily, and the cholestyramine twice daily.  MB 9/2/2022 Bony presents for follow-up of ulcerative colitis.  Since his last visit his repeat infliximab titers done June 29 at trough shows a low titer of antibody development and a low serum drug level even despite 10 mg/kg every 6-week dosing.  He did receive 2 doses at the high dose at the increased interval.  His repeat flexible sigmoidoscopy in June reveals left-sided moderate disease.  We made the decision to transition to Entyvio.  Since his last visit he has gotten 1 dose of Entyvio.  He did have significant arthralgias after the infusion however he also stopped his prednisone taper 2 days prior to starting the Entyvio.  He contacted me earlier this week with up to 10 watery bowel movements again daily.  We restarted a prednisone taper and his diarrhea is better today.  His arthralgias are also better.  His blood sugars remain labile however he has been managing this with insulin.  Today we have discussed his plan.  We will plan to proceed with induction of Entyvio.  I will see him after induction and check his titers.  If he does not have appropriate serum drug level would consider every 4 week dosing depending on his sequela.  MB 9/20/2022 The patient presents today for follow-up of his ulcerative colitis.  Since her last visit, we switched him from infliximab to Entyvio.  He has gotten 2 doses of Entyvio.  After both of his doses, he developed significant arthralgias.  Also, over the past 5 days, he has been off prednisone.  He is having up to 20 loose watery bowel movements daily.  He feels extremely weak and lethargic.  On his last colonoscopy, he was found to have active colitis.  At this point, we have discussed several options.  We have discussed proceeding with a Yuan inhibitor with Rinvoq.  We are going to try to get this approved through his insurance.  In the meantime, I am going to give him another prednisone taper.  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  They could be a component of coming off prednisone.  He also feels that he has long COVID, and he is wondering if this is contributing to his symptoms as well.  Fortunately, his labs are fairly normal other than his creatinine has been mildly elevated.  Today, we are going to switch him from Entyvio to Rinvoq and proceed with prednisone taper.  We have discussed the other option would possibly be Stelara. 11/16/2022 Bony presents follow up of UC. He has not gotten the Rinvoq because he has not uploaded his medicare card to their system. He and his wife will perform this today. Per haja's discussion with fadia kapoor he will be approved once they recieve this information. He is down to 5mg of prednisone and doing fairly well. He is having 1-3 urgent BMs most days. He is fatigued. His blood sugars are still labile. Today he agrees to pursue uploading the required documentation to obtain assistance and let me know when he receives therapy. MB  This telehealth visit was provided at the patient's home.

## 2022-12-06 ENCOUNTER — WEB ENCOUNTER (OUTPATIENT)
Dept: URBAN - NONMETROPOLITAN AREA CLINIC 2 | Facility: CLINIC | Age: 72
End: 2022-12-06

## 2023-01-04 ENCOUNTER — WEB ENCOUNTER (OUTPATIENT)
Dept: URBAN - NONMETROPOLITAN AREA CLINIC 2 | Facility: CLINIC | Age: 73
End: 2023-01-04

## 2023-01-18 ENCOUNTER — WEB ENCOUNTER (OUTPATIENT)
Dept: URBAN - NONMETROPOLITAN AREA CLINIC 2 | Facility: CLINIC | Age: 73
End: 2023-01-18

## 2023-01-20 ENCOUNTER — WEB ENCOUNTER (OUTPATIENT)
Dept: URBAN - NONMETROPOLITAN AREA CLINIC 2 | Facility: CLINIC | Age: 73
End: 2023-01-20

## 2023-01-30 ENCOUNTER — TELEPHONE ENCOUNTER (OUTPATIENT)
Dept: URBAN - NONMETROPOLITAN AREA CLINIC 2 | Facility: CLINIC | Age: 73
End: 2023-01-30

## 2023-01-31 ENCOUNTER — WEB ENCOUNTER (OUTPATIENT)
Dept: URBAN - NONMETROPOLITAN AREA CLINIC 2 | Facility: CLINIC | Age: 73
End: 2023-01-31

## 2023-02-02 LAB
A/G RATIO: 1.3
ABSOLUTE BASOPHILS: 20
ABSOLUTE EOSINOPHILS: 561
ABSOLUTE LYMPHOCYTES: 1597
ABSOLUTE MONOCYTES: 904
ABSOLUTE NEUTROPHILS: 3518
ALBUMIN: 3.9
ALKALINE PHOSPHATASE: 107
ALT (SGPT): 23
AST (SGOT): 23
BASOPHILS: 0.3
BILIRUBIN, TOTAL: 0.4
BUN/CREATININE RATIO: 10
BUN: 14
CALCIUM: 9.1
CARBON DIOXIDE, TOTAL: 26
CHLORIDE: 106
CREATININE: 1.41
EGFR: 53
EOSINOPHILS: 8.5
GLOBULIN, TOTAL: 3
GLUCOSE: 155
HEMATOCRIT: 33
HEMOGLOBIN: 10.6
LYMPHOCYTES: 24.2
MCH: 25.5
MCHC: 32.1
MCV: 79.5
MONOCYTES: 13.7
MPV: 10.9
NEUTROPHILS: 53.3
PLATELET COUNT: 378
POTASSIUM: 3.7
PROTEIN, TOTAL: 6.9
RDW: 17.9
RED BLOOD CELL COUNT: 4.15
SODIUM: 141
WHITE BLOOD CELL COUNT: 6.6

## 2023-02-06 ENCOUNTER — WEB ENCOUNTER (OUTPATIENT)
Dept: URBAN - NONMETROPOLITAN AREA CLINIC 2 | Facility: CLINIC | Age: 73
End: 2023-02-06

## 2023-02-06 LAB
CALPROTECTIN, FECAL: 573
CAMPYLOBACTER SPP. AG,EIA: (no result)
CLOSTRIDIUM DIFFICILE: (no result)
GIARDIA AG, EIA, STOOL: (no result)
LEUKOCYTES: (no result)
OVA AND PARASITES, CONC AND PERM SMEAR: (no result)
SALMONELLA AND SHIGELLA, CULTURE: (no result)
SHIGA TOXINS, EIA W/RFL TO E.COLI O157 CULTURE: (no result)

## 2023-02-07 ENCOUNTER — WEB ENCOUNTER (OUTPATIENT)
Dept: URBAN - NONMETROPOLITAN AREA CLINIC 2 | Facility: CLINIC | Age: 73
End: 2023-02-07

## 2023-02-07 ENCOUNTER — TELEPHONE ENCOUNTER (OUTPATIENT)
Dept: URBAN - NONMETROPOLITAN AREA CLINIC 2 | Facility: CLINIC | Age: 73
End: 2023-02-07

## 2023-02-08 ENCOUNTER — OFFICE VISIT (OUTPATIENT)
Dept: URBAN - NONMETROPOLITAN AREA CLINIC 13 | Facility: CLINIC | Age: 73
End: 2023-02-08
Payer: MEDICARE

## 2023-02-08 ENCOUNTER — LAB OUTSIDE AN ENCOUNTER (OUTPATIENT)
Dept: URBAN - NONMETROPOLITAN AREA CLINIC 13 | Facility: CLINIC | Age: 73
End: 2023-02-08

## 2023-02-08 VITALS
HEART RATE: 69 BPM | BODY MASS INDEX: 27.09 KG/M2 | TEMPERATURE: 98.3 F | DIASTOLIC BLOOD PRESSURE: 71 MMHG | SYSTOLIC BLOOD PRESSURE: 136 MMHG | HEIGHT: 72 IN | WEIGHT: 200 LBS

## 2023-02-08 DIAGNOSIS — K83.01 PSC (PRIMARY SCLEROSING CHOLANGITIS): ICD-10-CM

## 2023-02-08 DIAGNOSIS — A04.72 CLOSTRIDIUM DIFFICILE DIARRHEA: ICD-10-CM

## 2023-02-08 DIAGNOSIS — K51.018 ULCERATIVE PANCOLITIS WITH OTHER COMPLICATION: ICD-10-CM

## 2023-02-08 DIAGNOSIS — K21.9 ACID REFLUX: ICD-10-CM

## 2023-02-08 PROBLEM — 235595009: Status: ACTIVE | Noted: 2022-01-11

## 2023-02-08 PROCEDURE — 99214 OFFICE O/P EST MOD 30 MIN: CPT | Performed by: NURSE PRACTITIONER

## 2023-02-08 RX ORDER — METRONIDAZOLE 500 MG/1
1 TABLET TABLET ORAL THREE TIMES A DAY
Qty: 30 TABLETS | Refills: 0 | Status: ACTIVE | COMMUNITY

## 2023-02-08 RX ORDER — UPADACITINIB 15 MG/1
1 TABLET TABLET, EXTENDED RELEASE ORAL ONCE A DAY
Qty: 30 TABLET | Refills: 11 | Status: ACTIVE | COMMUNITY
Start: 2022-09-20 | End: 2023-10-21

## 2023-02-08 RX ORDER — PROPRANOLOL HYDROCHLORIDE 40 MG/1
AS DIRECTED TABLET ORAL
Status: ACTIVE | COMMUNITY

## 2023-02-08 RX ORDER — FAMOTIDINE 40 MG/1
TAKE 1 TABLET BY MOUTH TWICE A DAY TABLET, FILM COATED ORAL TWICE A DAY
Qty: 180 TABLET | Refills: 3 | Status: ACTIVE | COMMUNITY
Start: 2022-03-24

## 2023-02-08 RX ORDER — BUDESONIDE 3 MG/1
3 CAP PO DAILY CAPSULE, COATED PELLETS ORAL ONCE A DAY
Qty: 90 CAPSULE | Refills: 1 | Status: ACTIVE | COMMUNITY

## 2023-02-08 RX ORDER — HYOSCYAMINE SULFATE 0.12 MG/1
1 TABLET UNDER THE TONGUE AND ALLOW TO DISSOLVE  AS NEEDED TABLET SUBLINGUAL
Qty: 60 TABLET | Refills: 11 | Status: ACTIVE | COMMUNITY
Start: 2022-03-30 | End: 2023-03-25

## 2023-02-08 RX ORDER — FAMOTIDINE 40 MG/1
TAKE 1 TABLET BY MOUTH TWICE A DAY TABLET, FILM COATED ORAL TWICE A DAY
Qty: 180 TABLET | Refills: 3 | Status: ACTIVE | COMMUNITY
Start: 2022-01-11

## 2023-02-08 RX ORDER — MESALAMINE 800 MG/1
1 PILL THREE TIMES DAILY TABLET, DELAYED RELEASE ORAL THREE TIMES A DAY
Qty: 90 | Refills: 11 | Status: ACTIVE | COMMUNITY

## 2023-02-08 RX ORDER — SODIUM PICOSULFATE, MAGNESIUM OXIDE, AND ANHYDROUS CITRIC ACID 10; 3.5; 12 MG/160ML; G/160ML; G/160ML
160ML X 2 AS DIRECTED LIQUID ORAL ONCE
Qty: 320 MILLILITER | Refills: 0 | OUTPATIENT
Start: 2023-02-08 | End: 2023-02-09

## 2023-02-08 NOTE — HPI-TODAY'S VISIT:
Mr. Pavon is a 71-year-old male who is referred by Dr. Justin Hinson for consultation of ulcerative colitis, PSC, and diarrhea.  A copy of this note will be sent to the referring physician.  He states all of his issues began in January 2020.  He developed a viral upper respiratory disease which was likely Covid at the time but not diagnosed.  He has had a downhill drop of his health since.  Over the course of the following months he develops gastrointestinal distress.  He underwent cholecystectomy at [place] in August 2020 with no relief.  He then had a syncopal episode later that fall and was found to be profoundly anemic.  He had an EGD and colonoscopy by Dr. Becerril and was diagnosed with ulcerative colitis in November 2020.  He was treated with steroids, mesalamine, and Humira.  He has been on and off of prednisone since.  He states so long as he is on prednisone he has 1-3 formed bowel movements daily, however anytime he comes off he goes up to 30 bowel movements daily.  His last flare started 6 to 8 weeks ago, he called Dr. Becerril's office and was placed on 20 mg of prednisone with a slow taper.  He is down to 10 mg daily and having 3-4 urgent bowel movements with some incontinence.  He does report mucus but no bleeding, he does occasionally have nocturnal diarrhea.  This spring he continued to have elevated liver enzymes, he underwent an evaluation for questionable IPMN versus PSC.  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  He was placed on azathioprine along with ursodiol.  Since this summer he continues to struggle with his GI complaints.  He is on Florastor daily.  He is taking mesalamine 2 capsules 3 times daily.  He is on Humira every other week.  He has seen no improvement in his symptoms since starting azathioprine this spring.  Unfortunately we have none of Dr. Becerril's records including his EGD, colonoscopy, pathology, liver biopsy, or imaging.  He is also undergoing neurologic work-up for tremor, however looking back this all seemed to start post viral, and has worsened with steroids.  His blood sugars have been labile along with his blood pressure.  Today he is not doing well and is here for a second opinion.  MB   1/11/2022 Bony presents for follow-up of ulcerative colitis, questionable history of primary sclerosing cholangitis, and C. difficile. Since his last visit he underwent colonoscopy in September by Dr. Willis with moderate pancolitis. He was transitioned off Humira and started on steroids and Inflectra. After receiving 2 of his induction doses he developed progressive diarrhea. He was tested and found to be C. difficile positive. He completed a course of vancomycin with recurrent symptoms and treated twice. He underwent his third infusion with a flare of severe diarrhea in the third week in December. He was admitted and found to be toxin negative but antigen and PCR positive. He underwent flexible sigmoidoscopy with no significant pseudomembranous colitis. His steroids were discontinued and he was placed on a vancomycin taper by Dr. Carrillo. Today his diarrhea is slowing down. Yesterday he states he had around 15 loose bowel movements. Today he has only had approximately 6. He is not taking Florastor. He has been treated with colestipol in the past but is not taking this recently. I was able to review his liver biopsy ordered by Dr. Becerril, it is unclear that he has PSC from his liver biopsy. He does agree to a second opinion with hepatology at [place]. 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 He does agree to pursue Dificid plus or minus monoclonal antibody infusion if he has recurrence. He does agree to see infectious disease. We had a long discussion regarding dehydration. He does agree to fluids and repeat blood work this week. He understands that if he develops severe diarrhea abdominal cramping fever or distention that he is to proceed back to the emergency department. MB 1/25/2022 The patient presents today for follow-up of his ulcerative colitis and C. difficile with severe diarrhea.  Since our last visit, he has not been doing well.  He has been readmitted to the hospital secondary to diarrhea.  He was found to have severe C. difficile colitis.  He is having 10-20 bowel movements daily.  He denies any blood in his stool.  He denies any abdominal pain.  On flexible sigmoidoscopy, this was felt to be secondary to C. difficile.  He is tapering down his vancomycin, and he is taking colestipol twice a day.  We have discussed starting on cholestyramine 4 times a day and changing him to Dificid.  He does have a follow-up with infectious disease.  We are also going to check his labs today to make sure he is not dehydrated, and to make sure he does not need to be admitted for IV fluids.  At this point, we have discussed advancing his diet as tolerated.  Increasing his cholestyramine and adding Dificid.  Until the Dificid is improved, I do want him to take the vancomycin 4 times a day.  I have discussed this plan with the patient and his wife, and they are in agreement.  3/2/2022 Bony presents for follow up of UC and c. diff. SInce his last visit he did see ID. He never went on dificid and never had the monoclonal antibody as his repeat c. diff was negative, he completed the vanc taper and had the monoclonal ab infusion. He is doing better during the day, still loose urgent stools after meals. However at night he is having a BM every 45 minutes to an hour with tensmus. His last dose of inflectra 12/21 at 5mg/kg, that completed his induction. He is currently taking orally mesalamine 800mg TID. He is not taking the cholestyramine. His diarrhea at night is the worse. He doesn't feel like this c. diff diarrhea. Today he is still struggling with diarrhea at night and is due for infliximab. MB  This televisit was performed at the patient's home. 3/30/2022 Bony presents for follow-up of ulcerative colitis, C. difficile, reflux, diarrhea.  Since his last visit he continues to slowly improve.  He did follow-up with Dr. Landau with no indication of monoclonal antibody and repeat negative stool for C. difficile.  Currently he got his second maintenance dose of infliximab last week.  He is down to 4-5 loose bowel movements daily.  He is struggling with tenesmus but this is slowly improving.  He is almost done with his 8-week course of budesonide.  He is on cholestyramine twice daily, Florastor twice daily, and mesalamine 800 p.o. 3 times daily.  His reflux has been flaring since being off pantoprazole.  He does have some odynophagia.  I am concerned he may have a component of Candida.  He agrees to empiric treatment of Diflucan for 14 days.  His last EGD was in 2020 with reflux and gastritis by Dr. Becerril.  He is still awaiting his appointment with hepatology.  He is off azathioprine and Miles for now.  He is due for repeat blood work today.  I do want him to keep a stool study at home in case he has further symptoms of C. difficile as he lives an hour away.  We will see each other again after his next maintenance dose infusion and continue to monitor his symptoms closely.  MB   5/4/2022 Bony presents for follow up of UC, C. diff, reflux and diarrhea. He states last week he states he was feeling well. He was have a soft BM a few times a day. 6 days ago he developed acute urgent and watery diarrhea with a BM 20-30 times daily. He says he has incredible brain fog and incredible fatigue. He slept for 26 hours. He is up all night using the restroom. He is due for his next infliximab on 5/15. He denies any abdominal pain. He is very confused and is halucinating. Today he is very weak, he is down to 176lb. He is eating but not able to keep much down in regard to his diarrhea. MB 6/22/2022 Bony presents for follow-up of ulcerative colitis.  Since his last visit his repeat drug titers show elevated serum antibody and 0 serum drug level.  We increased his infliximab dose to 10 mg/kg and have scheduled this every 6 weeks.  He received a dose on May 25 and had also started a prednisone taper which she is finishing up this week down to 5 mg.  His flexible sigmoidoscopy in June shows moderate left-sided disease, his biopsies actually showed mildly active disease.  Today we have discussed his work-up.  He agrees to repeat his infliximab titers at trough to see if he has any further antibody development and to see if his serum drug level is appropriate at the higher dose.  If not he agrees to consider via Zaria Jules.  If so he agrees to give the infliximab a few more doses to see if things improve.  He can continues the mesalamine 3 times daily, the Florastor twice daily, and the cholestyramine twice daily.  MB 9/2/2022 Bony presents for follow-up of ulcerative colitis.  Since his last visit his repeat infliximab titers done June 29 at trough shows a low titer of antibody development and a low serum drug level even despite 10 mg/kg every 6-week dosing.  He did receive 2 doses at the high dose at the increased interval.  His repeat flexible sigmoidoscopy in June reveals left-sided moderate disease.  We made the decision to transition to Entyvio.  Since his last visit he has gotten 1 dose of Entyvio.  He did have significant arthralgias after the infusion however he also stopped his prednisone taper 2 days prior to starting the Entyvio.  He contacted me earlier this week with up to 10 watery bowel movements again daily.  We restarted a prednisone taper and his diarrhea is better today.  His arthralgias are also better.  His blood sugars remain labile however he has been managing this with insulin.  Today we have discussed his plan.  We will plan to proceed with induction of Entyvio.  I will see him after induction and check his titers.  If he does not have appropriate serum drug level would consider every 4 week dosing depending on his sequela.  MB 9/20/2022 The patient presents today for follow-up of his ulcerative colitis.  Since her last visit, we switched him from infliximab to Entyvio.  He has gotten 2 doses of Entyvio.  After both of his doses, he developed significant arthralgias.  Also, over the past 5 days, he has been off prednisone.  He is having up to 20 loose watery bowel movements daily.  He feels extremely weak and lethargic.  On his last colonoscopy, he was found to have active colitis.  At this point, we have discussed several options.  We have discussed proceeding with a Yuan inhibitor with Rinvoq.  We are going to try to get this approved through his insurance.  In the meantime, I am going to give him another prednisone taper.  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  They could be a component of coming off prednisone.  He also feels that he has long COVID, and he is wondering if this is contributing to his symptoms as well.  Fortunately, his labs are fairly normal other than his creatinine has been mildly elevated.  Today, we are going to switch him from Entyvio to Rinvoq and proceed with prednisone taper.  We have discussed the other option would possibly be Stelara. 11/16/2022 Bony presents follow up of UC. He has not gotten the Rinvoq because he has not uploaded his medicare card to their system. He and his wife will perform this today. Per haja's discussion with fadia kapoor he will be approved once they recieve this information. He is down to 5mg of prednisone and doing fairly well. He is having 1-3 urgent BMs most days. He is fatigued. His blood sugars are still labile. Today he agrees to pursue uploading the required documentation to obtain assistance and let me know when he receives therapy. MB  This telehealth visit was provided at the patient's home. 2/8/2023 Bony presents for follow-up of ulcerative colitis.  Since his last visit he started Cristela Skyler December 2.  He started at 45 mg daily which she took for 8 weeks.  On February 3 he decreased down to 15 mg daily for his maintenance dosing.  He subsequently developed increased urgent loose watery diarrhea.  He was having up to 15 bowel movements typically through the night.  His stool studies are negative for C. difficile but positive fecal calprotectin over 500.  His colonoscopy earlier this year shows pancolitis, his flex sig in June shows moderate persistent disease despite infliximab.  He was transition to Entyvio which we discontinued in December for invoke.  Today he continues to complain of loose urgent bowel movements.  Earlier this week his wife messaged me with lower extremity swelling, given the concern for DVT on" we sent him to the emergency room with no evidence of DVT.  He denies any abdominal pain but does report a low-grade fever.  He is following with [facility] on a trial for a long haul COVID.  He is on prednisone 5 mg every other day.  He feels that the Rinvoq did help his ulcerative colitis symptoms, he was on a long prednisone taper during this induction as well.  Today he is still having 10-15 loose bowel movements daily.  He is on mesalamine 800 twice daily.  He is taking Imodium as well.  His symptoms are predominantly at night.  He does agree to repeat colonoscopy, and will consider Stelara induction plus or minus written RINVOQ dosing to 30 mg daily depending on his results.  MB

## 2023-02-10 ENCOUNTER — WEB ENCOUNTER (OUTPATIENT)
Dept: URBAN - NONMETROPOLITAN AREA CLINIC 2 | Facility: CLINIC | Age: 73
End: 2023-02-10

## 2023-02-10 RX ORDER — HYOSCYAMINE SULFATE 0.12 MG/1
1 TABLET UNDER THE TONGUE AND ALLOW TO DISSOLVE  AS NEEDED TABLET SUBLINGUAL
Qty: 60 TABLET | Refills: 11 | Status: ACTIVE | COMMUNITY
Start: 2022-03-30 | End: 2023-03-25

## 2023-02-10 RX ORDER — FAMOTIDINE 40 MG/1
TAKE 1 TABLET BY MOUTH TWICE A DAY TABLET, FILM COATED ORAL TWICE A DAY
Qty: 180 TABLET | Refills: 3 | Status: ACTIVE | COMMUNITY
Start: 2022-03-24

## 2023-02-10 RX ORDER — USTEKINUMAB 130 MG/26ML
AS DIRECTED SOLUTION INTRAVENOUS ONCE
Qty: 390 MILLIGRAMS | Refills: 0 | OUTPATIENT
Start: 2023-02-15 | End: 2023-02-16

## 2023-02-10 RX ORDER — PROPRANOLOL HYDROCHLORIDE 40 MG/1
AS DIRECTED TABLET ORAL
Status: ACTIVE | COMMUNITY

## 2023-02-10 RX ORDER — METRONIDAZOLE 500 MG/1
1 TABLET TABLET ORAL THREE TIMES A DAY
Qty: 30 TABLETS | Refills: 0 | Status: ACTIVE | COMMUNITY

## 2023-02-10 RX ORDER — MESALAMINE 800 MG/1
1 PILL THREE TIMES DAILY TABLET, DELAYED RELEASE ORAL THREE TIMES A DAY
Qty: 90 | Refills: 11 | Status: ACTIVE | COMMUNITY

## 2023-02-10 RX ORDER — UPADACITINIB 15 MG/1
1 TABLET TABLET, EXTENDED RELEASE ORAL ONCE A DAY
Qty: 30 TABLET | Refills: 11 | Status: ACTIVE | COMMUNITY
Start: 2022-09-20 | End: 2023-10-21

## 2023-02-10 RX ORDER — FAMOTIDINE 40 MG/1
TAKE 1 TABLET BY MOUTH TWICE A DAY TABLET, FILM COATED ORAL TWICE A DAY
Qty: 180 TABLET | Refills: 3 | Status: ACTIVE | COMMUNITY
Start: 2022-01-11

## 2023-02-10 RX ORDER — BUDESONIDE 3 MG/1
3 CAP PO DAILY CAPSULE, COATED PELLETS ORAL ONCE A DAY
Qty: 90 CAPSULE | Refills: 1 | Status: ACTIVE | COMMUNITY

## 2023-02-10 RX ORDER — USTEKINUMAB 90 MG/ML
AS DIRECTED INJECTION, SOLUTION SUBCUTANEOUS
Qty: 1 PEN NEEDLE | Refills: 6 | OUTPATIENT
Start: 2023-02-15 | End: 2024-03-13

## 2023-02-13 ENCOUNTER — CLAIMS CREATED FROM THE CLAIM WINDOW (OUTPATIENT)
Dept: URBAN - NONMETROPOLITAN AREA SURGERY CENTER 1 | Facility: SURGERY CENTER | Age: 73
End: 2023-02-13

## 2023-02-13 ENCOUNTER — CLAIMS CREATED FROM THE CLAIM WINDOW (OUTPATIENT)
Dept: URBAN - NONMETROPOLITAN AREA SURGERY CENTER 1 | Facility: SURGERY CENTER | Age: 73
End: 2023-02-13
Payer: MEDICARE

## 2023-02-13 ENCOUNTER — CLAIMS CREATED FROM THE CLAIM WINDOW (OUTPATIENT)
Dept: URBAN - METROPOLITAN AREA CLINIC 4 | Facility: CLINIC | Age: 73
End: 2023-02-13
Payer: MEDICARE

## 2023-02-13 DIAGNOSIS — K51.919 ULCERATIVE COLITIS, UNSPECIFIED WITH UNSPECIFIED COMPLICATIONS: ICD-10-CM

## 2023-02-13 DIAGNOSIS — K51.011 CHRONIC ULCERATIVE ENTEROCOLITIS WITH RECTAL BLEEDING: ICD-10-CM

## 2023-02-13 PROCEDURE — G8907 PT DOC NO EVENTS ON DISCHARG: HCPCS | Performed by: INTERNAL MEDICINE

## 2023-02-13 PROCEDURE — 88305 TISSUE EXAM BY PATHOLOGIST: CPT | Performed by: PATHOLOGY

## 2023-02-13 PROCEDURE — 45380 COLONOSCOPY AND BIOPSY: CPT | Performed by: INTERNAL MEDICINE

## 2023-02-13 PROCEDURE — 88342 IMHCHEM/IMCYTCHM 1ST ANTB: CPT | Performed by: PATHOLOGY

## 2023-02-13 RX ORDER — HYOSCYAMINE SULFATE 0.12 MG/1
1 TABLET UNDER THE TONGUE AND ALLOW TO DISSOLVE  AS NEEDED TABLET SUBLINGUAL
Qty: 60 TABLET | Refills: 11 | Status: ACTIVE | COMMUNITY
Start: 2022-03-30 | End: 2023-03-25

## 2023-02-13 RX ORDER — METRONIDAZOLE 500 MG/1
1 TABLET TABLET ORAL THREE TIMES A DAY
Qty: 30 TABLETS | Refills: 0 | Status: ACTIVE | COMMUNITY

## 2023-02-13 RX ORDER — BUDESONIDE 3 MG/1
3 CAP PO DAILY CAPSULE, COATED PELLETS ORAL ONCE A DAY
Qty: 90 CAPSULE | Refills: 1 | Status: ACTIVE | COMMUNITY

## 2023-02-13 RX ORDER — FAMOTIDINE 40 MG/1
TAKE 1 TABLET BY MOUTH TWICE A DAY TABLET, FILM COATED ORAL TWICE A DAY
Qty: 180 TABLET | Refills: 3 | Status: ACTIVE | COMMUNITY
Start: 2022-01-11

## 2023-02-13 RX ORDER — FAMOTIDINE 40 MG/1
TAKE 1 TABLET BY MOUTH TWICE A DAY TABLET, FILM COATED ORAL TWICE A DAY
Qty: 180 TABLET | Refills: 3 | Status: ACTIVE | COMMUNITY
Start: 2022-03-24

## 2023-02-13 RX ORDER — MESALAMINE 800 MG/1
1 PILL THREE TIMES DAILY TABLET, DELAYED RELEASE ORAL THREE TIMES A DAY
Qty: 90 | Refills: 11 | Status: ACTIVE | COMMUNITY

## 2023-02-13 RX ORDER — PROPRANOLOL HYDROCHLORIDE 40 MG/1
AS DIRECTED TABLET ORAL
Status: ACTIVE | COMMUNITY

## 2023-02-13 RX ORDER — UPADACITINIB 15 MG/1
1 TABLET TABLET, EXTENDED RELEASE ORAL ONCE A DAY
Qty: 30 TABLET | Refills: 11 | Status: ACTIVE | COMMUNITY
Start: 2022-09-20 | End: 2023-10-21

## 2023-02-15 ENCOUNTER — LAB OUTSIDE AN ENCOUNTER (OUTPATIENT)
Dept: URBAN - NONMETROPOLITAN AREA CLINIC 2 | Facility: CLINIC | Age: 73
End: 2023-02-15

## 2023-02-17 ENCOUNTER — TELEPHONE ENCOUNTER (OUTPATIENT)
Dept: URBAN - METROPOLITAN AREA CLINIC 23 | Facility: CLINIC | Age: 73
End: 2023-02-17

## 2023-02-23 ENCOUNTER — WEB ENCOUNTER (OUTPATIENT)
Dept: URBAN - NONMETROPOLITAN AREA CLINIC 2 | Facility: CLINIC | Age: 73
End: 2023-02-23

## 2023-02-23 RX ORDER — UPADACITINIB 15 MG/1
1 TABLET TABLET, EXTENDED RELEASE ORAL ONCE A DAY
Qty: 30 TABLET | Refills: 11 | Status: ACTIVE | COMMUNITY
Start: 2022-09-20 | End: 2023-10-21

## 2023-02-23 RX ORDER — FAMOTIDINE 40 MG/1
TAKE 1 TABLET BY MOUTH TWICE A DAY TABLET, FILM COATED ORAL TWICE A DAY
Qty: 180 TABLET | Refills: 3 | Status: ACTIVE | COMMUNITY
Start: 2022-03-24

## 2023-02-23 RX ORDER — METRONIDAZOLE 500 MG/1
1 TABLET TABLET ORAL THREE TIMES A DAY
Qty: 30 TABLETS | Refills: 0 | Status: ACTIVE | COMMUNITY

## 2023-02-23 RX ORDER — BUDESONIDE 3 MG/1
3 CAP PO DAILY CAPSULE, COATED PELLETS ORAL ONCE A DAY
Qty: 90 CAPSULE | Refills: 1 | Status: ACTIVE | COMMUNITY

## 2023-02-23 RX ORDER — COLESTIPOL HYDROCHLORIDE 1 G/1
1 PO BID TABLET, FILM COATED ORAL BID
Qty: 180 TABLET | Refills: 3 | OUTPATIENT
Start: 2023-02-24

## 2023-02-23 RX ORDER — USTEKINUMAB 90 MG/ML
AS DIRECTED INJECTION, SOLUTION SUBCUTANEOUS
Qty: 1 PEN NEEDLE | Refills: 6 | Status: ACTIVE | COMMUNITY
Start: 2023-02-15 | End: 2024-03-13

## 2023-02-23 RX ORDER — BUDESONIDE 3 MG/1
3 CAP PO DAILY CAPSULE, DELAYED RELEASE PELLETS ORAL ONCE A DAY
Qty: 90 CAPSULE | Refills: 1 | OUTPATIENT
Start: 2023-02-24

## 2023-02-23 RX ORDER — HYOSCYAMINE SULFATE 0.12 MG/1
1 TABLET UNDER THE TONGUE AND ALLOW TO DISSOLVE  AS NEEDED TABLET SUBLINGUAL
Qty: 60 TABLET | Refills: 11 | Status: ACTIVE | COMMUNITY
Start: 2022-03-30 | End: 2023-03-25

## 2023-02-23 RX ORDER — FAMOTIDINE 40 MG/1
TAKE 1 TABLET BY MOUTH TWICE A DAY TABLET, FILM COATED ORAL TWICE A DAY
Qty: 180 TABLET | Refills: 3 | Status: ACTIVE | COMMUNITY
Start: 2022-01-11

## 2023-02-23 RX ORDER — PROPRANOLOL HYDROCHLORIDE 40 MG/1
AS DIRECTED TABLET ORAL
Status: ACTIVE | COMMUNITY

## 2023-02-23 RX ORDER — MESALAMINE 800 MG/1
1 PILL THREE TIMES DAILY TABLET, DELAYED RELEASE ORAL THREE TIMES A DAY
Qty: 90 | Refills: 11 | Status: ACTIVE | COMMUNITY

## 2023-02-24 ENCOUNTER — WEB ENCOUNTER (OUTPATIENT)
Dept: URBAN - NONMETROPOLITAN AREA CLINIC 2 | Facility: CLINIC | Age: 73
End: 2023-02-24

## 2023-02-24 LAB
MITOGEN-NIL: 2.56
QUANTIFERON NIL VALUE: 0.03
QUANTIFERON TB1 AG VALUE: 0.02
QUANTIFERON TB2 AG VALUE: 0.02
QUANTIFERON-TB GOLD PLUS: NEGATIVE

## 2023-03-01 ENCOUNTER — TELEPHONE ENCOUNTER (OUTPATIENT)
Dept: URBAN - NONMETROPOLITAN AREA CLINIC 13 | Facility: CLINIC | Age: 73
End: 2023-03-01

## 2023-03-01 ENCOUNTER — WEB ENCOUNTER (OUTPATIENT)
Dept: URBAN - NONMETROPOLITAN AREA CLINIC 2 | Facility: CLINIC | Age: 73
End: 2023-03-01

## 2023-03-02 ENCOUNTER — WEB ENCOUNTER (OUTPATIENT)
Dept: URBAN - NONMETROPOLITAN AREA CLINIC 2 | Facility: CLINIC | Age: 73
End: 2023-03-02

## 2023-03-03 ENCOUNTER — WEB ENCOUNTER (OUTPATIENT)
Dept: URBAN - NONMETROPOLITAN AREA CLINIC 2 | Facility: CLINIC | Age: 73
End: 2023-03-03

## 2023-03-08 ENCOUNTER — TELEPHONE ENCOUNTER (OUTPATIENT)
Dept: URBAN - METROPOLITAN AREA CLINIC 23 | Facility: CLINIC | Age: 73
End: 2023-03-08

## 2023-03-09 ENCOUNTER — WEB ENCOUNTER (OUTPATIENT)
Dept: URBAN - NONMETROPOLITAN AREA CLINIC 2 | Facility: CLINIC | Age: 73
End: 2023-03-09

## 2023-03-09 ENCOUNTER — OFFICE VISIT (OUTPATIENT)
Dept: URBAN - NONMETROPOLITAN AREA CLINIC 2 | Facility: CLINIC | Age: 73
End: 2023-03-09

## 2023-03-15 ENCOUNTER — TELEPHONE ENCOUNTER (OUTPATIENT)
Dept: URBAN - METROPOLITAN AREA CLINIC 23 | Facility: CLINIC | Age: 73
End: 2023-03-15

## 2023-03-15 ENCOUNTER — OFFICE VISIT (OUTPATIENT)
Dept: URBAN - METROPOLITAN AREA TELEHEALTH 2 | Facility: TELEHEALTH | Age: 73
End: 2023-03-15
Payer: MEDICARE

## 2023-03-15 DIAGNOSIS — R19.7 DIARRHEA, UNSPECIFIED TYPE: ICD-10-CM

## 2023-03-15 DIAGNOSIS — K83.01 PSC (PRIMARY SCLEROSING CHOLANGITIS): ICD-10-CM

## 2023-03-15 DIAGNOSIS — K51.018 ULCERATIVE PANCOLITIS WITH OTHER COMPLICATION: ICD-10-CM

## 2023-03-15 DIAGNOSIS — K21.9 GASTROESOPHAGEAL REFLUX DISEASE, UNSPECIFIED WHETHER ESOPHAGITIS PRESENT: ICD-10-CM

## 2023-03-15 PROCEDURE — 99214 OFFICE O/P EST MOD 30 MIN: CPT | Performed by: NURSE PRACTITIONER

## 2023-03-15 RX ORDER — COLESTIPOL HYDROCHLORIDE 1 G/1
1 PO BID TABLET, FILM COATED ORAL BID
Qty: 180 TABLET | Refills: 3 | Status: ACTIVE | COMMUNITY
Start: 2023-02-24

## 2023-03-15 RX ORDER — PROPRANOLOL HYDROCHLORIDE 40 MG/1
AS DIRECTED TABLET ORAL
Status: ACTIVE | COMMUNITY

## 2023-03-15 RX ORDER — MESALAMINE 800 MG/1
1 PILL THREE TIMES DAILY TABLET, DELAYED RELEASE ORAL THREE TIMES A DAY
Qty: 90 | Refills: 11 | Status: ACTIVE | COMMUNITY

## 2023-03-15 RX ORDER — USTEKINUMAB 90 MG/ML
AS DIRECTED INJECTION, SOLUTION SUBCUTANEOUS
Qty: 1 PEN NEEDLE | Refills: 6 | Status: ACTIVE | COMMUNITY
Start: 2023-02-15 | End: 2024-03-13

## 2023-03-15 RX ORDER — HYOSCYAMINE SULFATE 0.12 MG/1
1 TABLET UNDER THE TONGUE AND ALLOW TO DISSOLVE  AS NEEDED TABLET SUBLINGUAL
Qty: 60 TABLET | Refills: 11 | Status: ACTIVE | COMMUNITY
Start: 2022-03-30 | End: 2023-03-25

## 2023-03-15 RX ORDER — PREDNISONE 1 MG/1
TAKE 4 TAB EVERY OTHER DAY X 1 MONTH, 3 TAB EVERY OTHER DAY X 1 MONTH, 2 TAB EVERY OTHER DAY X 1 MONTH, 1 TAB EVERY OTHER DAY X 1 MONTH, STOP TABLET ORAL EVERY OTHER DAY
Qty: 100 | Refills: 1 | OUTPATIENT
Start: 2023-03-15 | End: 2023-09-11

## 2023-03-15 RX ORDER — FAMOTIDINE 40 MG/1
TAKE 1 TABLET BY MOUTH TWICE A DAY TABLET, FILM COATED ORAL TWICE A DAY
Qty: 180 TABLET | Refills: 3 | Status: ACTIVE | COMMUNITY
Start: 2022-01-11

## 2023-03-15 RX ORDER — METRONIDAZOLE 500 MG/1
1 TABLET TABLET ORAL THREE TIMES A DAY
Qty: 30 TABLETS | Refills: 0 | Status: ACTIVE | COMMUNITY

## 2023-03-15 RX ORDER — BUDESONIDE 3 MG/1
3 CAP PO DAILY CAPSULE, COATED PELLETS ORAL ONCE A DAY
Qty: 90 CAPSULE | Refills: 1 | Status: ACTIVE | COMMUNITY

## 2023-03-15 RX ORDER — FAMOTIDINE 40 MG/1
TAKE 1 TABLET BY MOUTH TWICE A DAY TABLET, FILM COATED ORAL TWICE A DAY
Qty: 180 TABLET | Refills: 3 | Status: ACTIVE | COMMUNITY
Start: 2022-03-24

## 2023-03-15 RX ORDER — BUDESONIDE 3 MG/1
3 CAP PO DAILY CAPSULE, DELAYED RELEASE PELLETS ORAL ONCE A DAY
Qty: 90 CAPSULE | Refills: 1 | Status: ACTIVE | COMMUNITY
Start: 2023-02-24

## 2023-03-15 RX ORDER — UPADACITINIB 15 MG/1
1 TABLET TABLET, EXTENDED RELEASE ORAL ONCE A DAY
Qty: 30 TABLET | Refills: 11 | Status: ACTIVE | COMMUNITY
Start: 2022-09-20 | End: 2023-10-21

## 2023-03-15 NOTE — HPI-TODAY'S VISIT:
Mr. Pavon is a 71-year-old male who is referred by Dr. Justin Hinson for consultation of ulcerative colitis, PSC, and diarrhea.  A copy of this note will be sent to the referring physician.  He states all of his issues began in January 2020.  He developed a viral upper respiratory disease which was likely Covid at the time but not diagnosed.  He has had a downhill drop of his health since.  Over the course of the following months he develops gastrointestinal distress.  He underwent cholecystectomy at [facility] in August 2020 with no relief.  He then had a syncopal episode later that fall and was found to be profoundly anemic.  He had an EGD and colonoscopy by Dr. Becerril and was diagnosed with ulcerative colitis in November 2020.  He was treated with steroids, mesalamine, and Humira.  He has been on and off of prednisone since.  He states so long as he is on prednisone he has 1-3 formed bowel movements daily, however anytime he comes off he goes up to 30 bowel movements daily.  His last flare started 6 to 8 weeks ago, he called Dr. Becerril's office and was placed on 20 mg of prednisone with a slow taper.  He is down to 10 mg daily and having 3-4 urgent bowel movements with some incontinence.  He does report mucus but no bleeding, he does occasionally have nocturnal diarrhea.  This spring he continued to have elevated liver enzymes, he underwent an evaluation for questionable IPMN versus PSC.  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  He was placed on azathioprine along with ursodiol.  Since this summer he continues to struggle with his GI complaints.  He is on Florastor daily.  He is taking mesalamine 2 capsules 3 times daily.  He is on Humira every other week.  He has seen no improvement in his symptoms since starting azathioprine this spring.  Unfortunately we have none of Dr. Becerril's records including his EGD, colonoscopy, pathology, liver biopsy, or imaging.  He is also undergoing neurologic work-up for tremor, however looking back this all seemed to start post viral, and has worsened with steroids.  His blood sugars have been labile along with his blood pressure.  Today he is not doing well and is here for a second opinion.  MB   1/11/2022 Bony presents for follow-up of ulcerative colitis, questionable history of primary sclerosing cholangitis, and C. difficile. Since his last visit he underwent colonoscopy in September by Dr. Willis with moderate pancolitis. He was transitioned off Humira and started on steroids and Inflectra. After receiving 2 of his induction doses he developed progressive diarrhea. He was tested and found to be C. difficile positive. He completed a course of vancomycin with recurrent symptoms and treated twice. He underwent his third infusion with a flare of severe diarrhea in the third week in December. He was admitted and found to be toxin negative but antigen and PCR positive. He underwent flexible sigmoidoscopy with no significant pseudomembranous colitis. His steroids were discontinued and he was placed on a vancomycin taper by Dr. Carrillo. Today his diarrhea is slowing down. Yesterday he states he had around 15 loose bowel movements. Today he has only had approximately 6. He is not taking Florastor. He has been treated with colestipol in the past but is not taking this recently. I was able to review his liver biopsy ordered by Dr. Becerril, it is unclear that he has PSC from his liver biopsy. He does agree to a second opinion with hepatology at [facility]. 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 He does agree to pursue Dificid plus or minus monoclonal antibody infusion if he has recurrence. He does agree to see infectious disease. We had a long discussion regarding dehydration. He does agree to fluids and repeat blood work this week. He understands that if he develops severe diarrhea abdominal cramping fever or distention that he is to proceed back to the emergency department. MB 1/25/2022 The patient presents today for follow-up of his ulcerative colitis and C. difficile with severe diarrhea.  Since our last visit, he has not been doing well.  He has been readmitted to the hospital secondary to diarrhea.  He was found to have severe C. difficile colitis.  He is having 10-20 bowel movements daily.  He denies any blood in his stool.  He denies any abdominal pain.  On flexible sigmoidoscopy, this was felt to be secondary to C. difficile.  He is tapering down his vancomycin, and he is taking colestipol twice a day.  We have discussed starting on cholestyramine 4 times a day and changing him to Dificid.  He does have a follow-up with infectious disease.  We are also going to check his labs today to make sure he is not dehydrated, and to make sure he does not need to be admitted for IV fluids.  At this point, we have discussed advancing his diet as tolerated.  Increasing his cholestyramine and adding Dificid.  Until the Dificid is improved, I do want him to take the vancomycin 4 times a day.  I have discussed this plan with the patient and his wife, and they are in agreement.  3/2/2022 Bony presents for follow up of UC and c. diff. SInce his last visit he did see ID. He never went on dificid and never had the monoclonal antibody as his repeat c. diff was negative, he completed the vanc taper and had the monoclonal ab infusion. He is doing better during the day, still loose urgent stools after meals. However at night he is having a BM every 45 minutes to an hour with tensmus. His last dose of inflectra 12/21 at 5mg/kg, that completed his induction. He is currently taking orally mesalamine 800mg TID. He is not taking the cholestyramine. His diarrhea at night is the worse. He doesn't feel like this c. diff diarrhea. Today he is still struggling with diarrhea at night and is due for infliximab. MB  This televisit was performed at the patient's home. 3/30/2022 Bony presents for follow-up of ulcerative colitis, C. difficile, reflux, diarrhea.  Since his last visit he continues to slowly improve.  He did follow-up with Dr. Landau with no indication of monoclonal antibody and repeat negative stool for C. difficile.  Currently he got his second maintenance dose of infliximab last week.  He is down to 4-5 loose bowel movements daily.  He is struggling with tenesmus but this is slowly improving.  He is almost done with his 8-week course of budesonide.  He is on cholestyramine twice daily, Florastor twice daily, and mesalamine 800 p.o. 3 times daily.  His reflux has been flaring since being off pantoprazole.  He does have some odynophagia.  I am concerned he may have a component of Candida.  He agrees to empiric treatment of Diflucan for 14 days.  His last EGD was in 2020 with reflux and gastritis by Dr. Becerril.  He is still awaiting his appointment with hepatology.  He is off azathioprine and Miles for now.  He is due for repeat blood work today.  I do want him to keep a stool study at home in case he has further symptoms of C. difficile as he lives an hour away.  We will see each other again after his next maintenance dose infusion and continue to monitor his symptoms closely.  MB   5/4/2022 Bony presents for follow up of UC, C. diff, reflux and diarrhea. He states last week he states he was feeling well. He was have a soft BM a few times a day. 6 days ago he developed acute urgent and watery diarrhea with a BM 20-30 times daily. He says he has incredible brain fog and incredible fatigue. He slept for 26 hours. He is up all night using the restroom. He is due for his next infliximab on 5/15. He denies any abdominal pain. He is very confused and is halucinating. Today he is very weak, he is down to 176lb. He is eating but not able to keep much down in regard to his diarrhea. MB 6/22/2022 Bony presents for follow-up of ulcerative colitis.  Since his last visit his repeat drug titers show elevated serum antibody and 0 serum drug level.  We increased his infliximab dose to 10 mg/kg and have scheduled this every 6 weeks.  He received a dose on May 25 and had also started a prednisone taper which she is finishing up this week down to 5 mg.  His flexible sigmoidoscopy in June shows moderate left-sided disease, his biopsies actually showed mildly active disease.  Today we have discussed his work-up.  He agrees to repeat his infliximab titers at trough to see if he has any further antibody development and to see if his serum drug level is appropriate at the higher dose.  If not he agrees to consider via [facility] Jules.  If so he agrees to give the infliximab a few more doses to see if things improve.  He can continues the mesalamine 3 times daily, the Florastor twice daily, and the cholestyramine twice daily.  MB 9/2/2022 Bony presents for follow-up of ulcerative colitis.  Since his last visit his repeat infliximab titers done June 29 at trough shows a low titer of antibody development and a low serum drug level even despite 10 mg/kg every 6-week dosing.  He did receive 2 doses at the high dose at the increased interval.  His repeat flexible sigmoidoscopy in June reveals left-sided moderate disease.  We made the decision to transition to Entyvio.  Since his last visit he has gotten 1 dose of Entyvio.  He did have significant arthralgias after the infusion however he also stopped his prednisone taper 2 days prior to starting the Entyvio.  He contacted me earlier this week with up to 10 watery bowel movements again daily.  We restarted a prednisone taper and his diarrhea is better today.  His arthralgias are also better.  His blood sugars remain labile however he has been managing this with insulin.  Today we have discussed his plan.  We will plan to proceed with induction of Entyvio.  I will see him after induction and check his titers.  If he does not have appropriate serum drug level would consider every 4 week dosing depending on his sequela.  MB 9/20/2022 The patient presents today for follow-up of his ulcerative colitis.  Since her last visit, we switched him from infliximab to Entyvio.  He has gotten 2 doses of Entyvio.  After both of his doses, he developed significant arthralgias.  Also, over the past 5 days, he has been off prednisone.  He is having up to 20 loose watery bowel movements daily.  He feels extremely weak and lethargic.  On his last colonoscopy, he was found to have active colitis.  At this point, we have discussed several options.  We have discussed proceeding with a Yuan inhibitor with Rinvoq.  We are going to try to get this approved through his insurance.  In the meantime, I am going to give him another prednisone taper.  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  They could be a component of coming off prednisone.  He also feels that he has long COVID, and he is wondering if this is contributing to his symptoms as well.  Fortunately, his labs are fairly normal other than his creatinine has been mildly elevated.  Today, we are going to switch him from Entyvio to Rinvoq and proceed with prednisone taper.  We have discussed the other option would possibly be Stelara. 11/16/2022 Bony presents follow up of UC. He has not gotten the Rinvoq because he has not uploaded his medicare card to their system. He and his wife will perform this today. Per haja's discussion with fadia kapoor he will be approved once they recieve this information. He is down to 5mg of prednisone and doing fairly well. He is having 1-3 urgent BMs most days. He is fatigued. His blood sugars are still labile. Today he agrees to pursue uploading the required documentation to obtain assistance and let me know when he receives therapy. MB  This telehealth visit was provided at the patient's home. 2/8/2023 Bony presents for follow-up of ulcerative colitis.  Since his last visit he started Cristela Skyler December 2.  He started at 45 mg daily which she took for 8 weeks.  On February 3 he decreased down to 15 mg daily for his maintenance dosing.  He subsequently developed increased urgent loose watery diarrhea.  He was having up to 15 bowel movements typically through the night.  His stool studies are negative for C. difficile but positive fecal calprotectin over 500.  His colonoscopy earlier this year shows pancolitis, his flex sig in June shows moderate persistent disease despite infliximab.  He was transition to Entyvio which we discontinued in December for invoke.  Today he continues to complain of loose urgent bowel movements.  Earlier this week his wife messaged me with lower extremity swelling, given the concern for DVT on" we sent him to the emergency room with no evidence of DVT.  He denies any abdominal pain but does report a low-grade fever.  He is following with [facility] on a trial for a long haul COVID.  He is on prednisone 5 mg every other day.  He feels that the Rinvoq did help his ulcerative colitis symptoms, he was on a long prednisone taper during this induction as well.  Today he is still having 10-15 loose bowel movements daily.  He is on mesalamine 800 twice daily.  He is taking Imodium as well.  His symptoms are predominantly at night.  He does agree to repeat colonoscopy, and will consider Stelara induction plus or minus written RINVOQ dosing to 30 mg daily depending on his results.  MB  3/15/2023 Bony presents for [facility]. Since his last visit he started colestipol 1 gm twice daily and budesonide 9mg daily. He has also been going on [facility] Tuesdays and Fridays to get fluids. He states in the past 8 days he is feeling great. He is currently on 15mg of Rinvoq. He is down to 3 BMs daily with no bleeding or mucous. He is occasionally having loose stools which he relates to eating a high sugar meal. He is on prednisone 5mg every other day. He would like to try and wean off prednisone. He feels like the Rinvoq is really helping. He wants to continue Rinvoq for now. He wants to hold off on surgery referral. He agrees to hold off on fluids unless he is having 6-8 watery stools for 3 days in a row. Today he is doing better overall. MB  This telehealth visit was provided at the patient's home.

## 2023-03-30 ENCOUNTER — LAB OUTSIDE AN ENCOUNTER (OUTPATIENT)
Dept: URBAN - NONMETROPOLITAN AREA CLINIC 2 | Facility: CLINIC | Age: 73
End: 2023-03-30

## 2023-04-10 ENCOUNTER — ERX REFILL RESPONSE (OUTPATIENT)
Dept: URBAN - NONMETROPOLITAN AREA CLINIC 2 | Facility: CLINIC | Age: 73
End: 2023-04-10

## 2023-04-10 RX ORDER — FAMOTIDINE 40 MG/1
TAKE 1 TABLET BY MOUTH TWICE A DAY TABLET, FILM COATED ORAL TWICE A DAY
Qty: 180 TABLET | Refills: 3 | OUTPATIENT

## 2023-04-21 ENCOUNTER — WEB ENCOUNTER (OUTPATIENT)
Dept: URBAN - NONMETROPOLITAN AREA CLINIC 2 | Facility: CLINIC | Age: 73
End: 2023-04-21

## 2023-04-26 ENCOUNTER — WEB ENCOUNTER (OUTPATIENT)
Dept: URBAN - NONMETROPOLITAN AREA CLINIC 2 | Facility: CLINIC | Age: 73
End: 2023-04-26

## 2023-05-16 ENCOUNTER — ERX REFILL RESPONSE (OUTPATIENT)
Dept: URBAN - NONMETROPOLITAN AREA CLINIC 2 | Facility: CLINIC | Age: 73
End: 2023-05-16

## 2023-05-16 RX ORDER — BUDESONIDE 3 MG/1
3 CAP PO DAILY CAPSULE, DELAYED RELEASE PELLETS ORAL ONCE A DAY
Qty: 90 CAPSULE | Refills: 1 | OUTPATIENT

## 2023-05-16 RX ORDER — MESALAMINE 800 MG/1
1 PILL THREE TIMES DAILY TABLET, DELAYED RELEASE ORAL THREE TIMES A DAY
Qty: 90 | Refills: 11 | OUTPATIENT

## 2023-05-16 RX ORDER — MESALAMINE 800 MG/1
TAKE 1 TABLET BY MOUTH 3 TIMES A DAY TABLET, DELAYED RELEASE ORAL
Qty: 90 TABLET | Refills: 3 | OUTPATIENT

## 2023-05-22 ENCOUNTER — TELEPHONE ENCOUNTER (OUTPATIENT)
Dept: URBAN - NONMETROPOLITAN AREA CLINIC 2 | Facility: CLINIC | Age: 73
End: 2023-05-22

## 2023-05-22 RX ORDER — PREDNISONE 1 MG/1
3 TABLETS WITH FOOD OR MILK TABLET ORAL EVERY OTHER DAY
Qty: 135 | Refills: 0 | OUTPATIENT
Start: 2023-05-22 | End: 2023-08-20

## 2023-06-01 ENCOUNTER — WEB ENCOUNTER (OUTPATIENT)
Dept: URBAN - NONMETROPOLITAN AREA CLINIC 2 | Facility: CLINIC | Age: 73
End: 2023-06-01

## 2023-06-01 RX ORDER — PREDNISONE 1 MG/1
3 TAB EVERY OTHER DAY X 1 MONTH, THEN 2 TAB EVERY OTHER DAY X 1 MONTH, THEN 1 TAB EVERY OTHER DAY X 1 MONTH TABLET ORAL EVERY OTHER DAY
Qty: 270 TABLETS | Refills: 0
Start: 2023-03-15 | End: 2023-08-30

## 2023-06-21 ENCOUNTER — LAB OUTSIDE AN ENCOUNTER (OUTPATIENT)
Dept: URBAN - METROPOLITAN AREA TELEHEALTH 2 | Facility: TELEHEALTH | Age: 73
End: 2023-06-21

## 2023-06-21 ENCOUNTER — OFFICE VISIT (OUTPATIENT)
Dept: URBAN - METROPOLITAN AREA TELEHEALTH 2 | Facility: TELEHEALTH | Age: 73
End: 2023-06-21
Payer: MEDICARE

## 2023-06-21 DIAGNOSIS — K51.018 ULCERATIVE PANCOLITIS WITH OTHER COMPLICATION: ICD-10-CM

## 2023-06-21 DIAGNOSIS — K83.01 PSC (PRIMARY SCLEROSING CHOLANGITIS): ICD-10-CM

## 2023-06-21 DIAGNOSIS — R74.8 ELEVATED LIVER ENZYMES: ICD-10-CM

## 2023-06-21 DIAGNOSIS — R19.7 DIARRHEA, UNSPECIFIED TYPE: ICD-10-CM

## 2023-06-21 PROBLEM — 197441003: Status: ACTIVE | Noted: 2021-09-16

## 2023-06-21 PROCEDURE — 99214 OFFICE O/P EST MOD 30 MIN: CPT | Performed by: NURSE PRACTITIONER

## 2023-06-21 RX ORDER — PROPRANOLOL HYDROCHLORIDE 40 MG/1
AS DIRECTED TABLET ORAL
Status: ACTIVE | COMMUNITY

## 2023-06-21 RX ORDER — SODIUM PICOSULFATE, MAGNESIUM OXIDE, AND ANHYDROUS CITRIC ACID 12; 3.5; 1 G/175ML; G/175ML; MG/175ML
175 ML THE FIRST DOSE THE EVENING BEFORE AND SECOND DOSE THE MORNING OF COLONOSCOPY LIQUID ORAL ONCE A DAY
Qty: 350 | OUTPATIENT
Start: 2023-06-21 | End: 2023-06-23

## 2023-06-21 RX ORDER — PREDNISONE 1 MG/1
3 TAB EVERY OTHER DAY X 1 MONTH, THEN 2 TAB EVERY OTHER DAY X 1 MONTH, THEN 1 TAB EVERY OTHER DAY X 1 MONTH TABLET ORAL EVERY OTHER DAY
Qty: 270 TABLETS | Refills: 0 | Status: ACTIVE | COMMUNITY
Start: 2023-03-15 | End: 2023-08-30

## 2023-06-21 RX ORDER — BUDESONIDE 3 MG/1
3 CAP PO DAILY CAPSULE, DELAYED RELEASE PELLETS ORAL ONCE A DAY
Qty: 90 CAPSULE | Refills: 1 | Status: ACTIVE | COMMUNITY

## 2023-06-21 RX ORDER — USTEKINUMAB 90 MG/ML
AS DIRECTED INJECTION, SOLUTION SUBCUTANEOUS
Qty: 1 PEN NEEDLE | Refills: 6 | Status: ACTIVE | COMMUNITY
Start: 2023-02-15 | End: 2024-03-13

## 2023-06-21 RX ORDER — COLESTIPOL HYDROCHLORIDE 1 G/1
1 PO BID TABLET, FILM COATED ORAL BID
Qty: 180 TABLET | Refills: 3 | Status: ACTIVE | COMMUNITY
Start: 2023-02-24

## 2023-06-21 RX ORDER — UPADACITINIB 15 MG/1
1 TABLET TABLET, EXTENDED RELEASE ORAL ONCE A DAY
Qty: 30 TABLET | Refills: 11 | Status: ACTIVE | COMMUNITY
Start: 2022-09-20 | End: 2023-10-21

## 2023-06-21 RX ORDER — MESALAMINE 800 MG/1
TAKE 1 TABLET BY MOUTH 3 TIMES A DAY TABLET, DELAYED RELEASE ORAL
Qty: 90 TABLET | Refills: 3 | Status: ACTIVE | COMMUNITY

## 2023-06-21 RX ORDER — PREDNISONE 1 MG/1
3 TABLETS WITH FOOD OR MILK TABLET ORAL EVERY OTHER DAY
Qty: 135 | Refills: 0 | Status: ACTIVE | COMMUNITY
Start: 2023-05-22 | End: 2023-08-20

## 2023-06-21 RX ORDER — FAMOTIDINE 40 MG/1
TAKE 1 TABLET BY MOUTH TWICE A DAY TABLET, FILM COATED ORAL TWICE A DAY
Qty: 180 TABLET | Refills: 3 | Status: ACTIVE | COMMUNITY

## 2023-06-21 RX ORDER — METRONIDAZOLE 500 MG/1
1 TABLET TABLET ORAL THREE TIMES A DAY
Qty: 30 TABLETS | Refills: 0 | Status: ACTIVE | COMMUNITY

## 2023-06-21 NOTE — HPI-TODAY'S VISIT:
Mr. Pavon is a 71-year-old male who is referred by Dr. Justin Hinson for consultation of ulcerative colitis, PSC, and diarrhea.  A copy of this note will be sent to the referring physician.  He states all of his issues began in January 2020.  He developed a viral upper respiratory disease which was likely Covid at the time but not diagnosed.  He has had a downhill drop of his health since.  Over the course of the following months he develops gastrointestinal distress.  He underwent cholecystectomy at [facility] in August 2020 with no relief.  He then had a syncopal episode later that fall and was found to be profoundly anemic.  He had an EGD and colonoscopy by Dr. Becerril and was diagnosed with ulcerative colitis in November 2020.  He was treated with steroids, mesalamine, and Humira.  He has been on and off of prednisone since.  He states so long as he is on prednisone he has 1-3 formed bowel movements daily, however anytime he comes off he goes up to 30 bowel movements daily.  His last flare started 6 to 8 weeks ago, he called Dr. Becerril's office and was placed on 20 mg of prednisone with a slow taper.  He is down to 10 mg daily and having 3-4 urgent bowel movements with some incontinence.  He does report mucus but no bleeding, he does occasionally have nocturnal diarrhea.  This spring he continued to have elevated liver enzymes, he underwent an evaluation for questionable IPMN versus PSC.  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  He was placed on azathioprine along with ursodiol.  Since this summer he continues to struggle with his GI complaints.  He is on Florastor daily.  He is taking mesalamine 2 capsules 3 times daily.  He is on Humira every other week.  He has seen no improvement in his symptoms since starting azathioprine this spring.  Unfortunately we have none of Dr. Becerril's records including his EGD, colonoscopy, pathology, liver biopsy, or imaging.  He is also undergoing neurologic work-up for tremor, however looking back this all seemed to start post viral, and has worsened with steroids.  His blood sugars have been labile along with his blood pressure.  Today he is not doing well and is here for a second opinion.  MB   1/11/2022 Bony presents for follow-up of ulcerative colitis, questionable history of primary sclerosing cholangitis, and C. difficile. Since his last visit he underwent colonoscopy in September by Dr. Willis with moderate pancolitis. He was transitioned off Humira and started on steroids and Inflectra. After receiving 2 of his induction doses he developed progressive diarrhea. He was tested and found to be C. difficile positive. He completed a course of vancomycin with recurrent symptoms and treated twice. He underwent his third infusion with a flare of severe diarrhea in the third week in December. He was admitted and found to be toxin negative but antigen and PCR positive. He underwent flexible sigmoidoscopy with no significant pseudomembranous colitis. His steroids were discontinued and he was placed on a vancomycin taper by Dr. Carrillo. Today his diarrhea is slowing down. Yesterday he states he had around 15 loose bowel movements. Today he has only had approximately 6. He is not taking Florastor. He has been treated with colestipol in the past but is not taking this recently. I was able to review his liver biopsy ordered by Dr. Becerril, it is unclear that he has PSC from his liver biopsy. He does agree to a second opinion with hepatology at [facility]. 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 He does agree to pursue Dificid plus or minus monoclonal antibody infusion if he has recurrence. He does agree to see infectious disease. We had a long discussion regarding dehydration. He does agree to fluids and repeat blood work this week. He understands that if he develops severe diarrhea abdominal cramping fever or distention that he is to proceed back to the emergency department. MB 1/25/2022 The patient presents today for follow-up of his ulcerative colitis and C. difficile with severe diarrhea.  Since our last visit, he has not been doing well.  He has been readmitted to the hospital secondary to diarrhea.  He was found to have severe C. difficile colitis.  He is having 10-20 bowel movements daily.  He denies any blood in his stool.  He denies any abdominal pain.  On flexible sigmoidoscopy, this was felt to be secondary to C. difficile.  He is tapering down his vancomycin, and he is taking colestipol twice a day.  We have discussed starting on cholestyramine 4 times a day and changing him to Dificid.  He does have a follow-up with infectious disease.  We are also going to check his labs today to make sure he is not dehydrated, and to make sure he does not need to be admitted for IV fluids.  At this point, we have discussed advancing his diet as tolerated.  Increasing his cholestyramine and adding Dificid.  Until the Dificid is improved, I do want him to take the vancomycin 4 times a day.  I have discussed this plan with the patient and his wife, and they are in agreement.  3/2/2022 Bony presents for follow up of UC and c. diff. SInce his last visit he did see ID. He never went on dificid and never had the monoclonal antibody as his repeat c. diff was negative, he completed the vanc taper and had the monoclonal ab infusion. He is doing better during the day, still loose urgent stools after meals. However at night he is having a BM every 45 minutes to an hour with tensmus. His last dose of inflectra 12/21 at 5mg/kg, that completed his induction. He is currently taking orally mesalamine 800mg TID. He is not taking the cholestyramine. His diarrhea at night is the worse. He doesn't feel like this c. diff diarrhea. Today he is still struggling with diarrhea at night and is due for infliximab. MB  This televisit was performed at the patient's home. 3/30/2022 Bony presents for follow-up of ulcerative colitis, C. difficile, reflux, diarrhea.  Since his last visit he continues to slowly improve.  He did follow-up with Dr. Landau with no indication of monoclonal antibody and repeat negative stool for C. difficile.  Currently he got his second maintenance dose of infliximab last week.  He is down to 4-5 loose bowel movements daily.  He is struggling with tenesmus but this is slowly improving.  He is almost done with his 8-week course of budesonide.  He is on cholestyramine twice daily, Florastor twice daily, and mesalamine 800 p.o. 3 times daily.  His reflux has been flaring since being off pantoprazole.  He does have some odynophagia.  I am concerned he may have a component of Candida.  He agrees to empiric treatment of Diflucan for 14 days.  His last EGD was in 2020 with reflux and gastritis by Dr. Becerril.  He is still awaiting his appointment with hepatology.  He is off azathioprine and Miles for now.  He is due for repeat blood work today.  I do want him to keep a stool study at home in case he has further symptoms of C. difficile as he lives an hour away.  We will see each other again after his next maintenance dose infusion and continue to monitor his symptoms closely.  MB   5/4/2022 Bony presents for follow up of UC, C. diff, reflux and diarrhea. He states last week he states he was feeling well. He was have a soft BM a few times a day. 6 days ago he developed acute urgent and watery diarrhea with a BM 20-30 times daily. He says he has incredible brain fog and incredible fatigue. He slept for 26 hours. He is up all night using the restroom. He is due for his next infliximab on 5/15. He denies any abdominal pain. He is very confused and is halucinating. Today he is very weak, he is down to 176lb. He is eating but not able to keep much down in regard to his diarrhea. MB 6/22/2022 Bony presents for follow-up of ulcerative colitis.  Since his last visit his repeat drug titers show elevated serum antibody and 0 serum drug level.  We increased his infliximab dose to 10 mg/kg and have scheduled this every 6 weeks.  He received a dose on May 25 and had also started a prednisone taper which she is finishing up this week down to 5 mg.  His flexible sigmoidoscopy in June shows moderate left-sided disease, his biopsies actually showed mildly active disease.  Today we have discussed his work-up.  He agrees to repeat his infliximab titers at trough to see if he has any further antibody development and to see if his serum drug level is appropriate at the higher dose.  If not he agrees to consider via [facility] J[facility].  If so he agrees to give the infliximab a few more doses to see if things improve.  He can continues the mesalamine 3 times daily, the Florastor twice daily, and the cholestyramine twice daily.  MB 9/2/2022 Bony presents for follow-up of ulcerative colitis.  Since his last visit his repeat infliximab titers done June 29 at trough shows a low titer of antibody development and a low serum drug level even despite 10 mg/kg every 6-week dosing.  He did receive 2 doses at the high dose at the increased interval.  His repeat flexible sigmoidoscopy in June reveals left-sided moderate disease.  We made the decision to transition to Entyvio.  Since his last visit he has gotten 1 dose of Entyvio.  He did have significant arthralgias after the infusion however he also stopped his prednisone taper 2 days prior to starting the Entyvio.  He contacted me earlier this week with up to 10 watery bowel movements again daily.  We restarted a prednisone taper and his diarrhea is better today.  His arthralgias are also better.  His blood sugars remain labile however he has been managing this with insulin.  Today we have discussed his plan.  We will plan to proceed with induction of Entyvio.  I will see him after induction and check his titers.  If he does not have appropriate serum drug level would consider every 4 week dosing depending on his sequela.  MB 9/20/2022 The patient presents today for follow-up of his ulcerative colitis.  Since her last visit, we switched him from infliximab to Entyvio.  He has gotten 2 doses of Entyvio.  After both of his doses, he developed significant arthralgias.  Also, over the past 5 days, he has been off prednisone.  He is having up to 20 loose watery bowel movements daily.  He feels extremely weak and lethargic.  On his last colonoscopy, he was found to have active colitis.  At this point, we have discussed several options.  We have discussed proceeding with a Yuan inhibitor with Rinvoq.  We are going to try to get this approved through his insurance.  In the meantime, I am going to give him another prednisone taper.  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  They could be a component of coming off prednisone.  He also feels that he has long COVID, and he is wondering if this is contributing to his symptoms as well.  Fortunately, his labs are fairly normal other than his creatinine has been mildly elevated.  Today, we are going to switch him from Entyvio to Rinvoq and proceed with prednisone taper.  We have discussed the other option would possibly be Stelara. 11/16/2022 Bony presents follow up of UC. He has not gotten the Rinvoq because he has not uploaded his medicare card to their system. He and his wife will perform this today. Per haja's discussion with fadia kapoor he will be approved once they recieve this information. He is down to 5mg of prednisone and doing fairly well. He is having 1-3 urgent BMs most days. He is fatigued. His blood sugars are still labile. Today he agrees to pursue uploading the required documentation to obtain assistance and let me know when he receives therapy. MB  This telehealth visit was provided at the patient's home. 2/8/2023 Bony presents for follow-up of ulcerative colitis.  Since his last visit he started Cristela Skyler December 2.  He started at 45 mg daily which she took for 8 weeks.  On February 3 he decreased down to 15 mg daily for his maintenance dosing.  He subsequently developed increased urgent loose watery diarrhea.  He was having up to 15 bowel movements typically through the night.  His stool studies are negative for C. difficile but positive fecal calprotectin over 500.  His colonoscopy earlier this year shows pancolitis, his flex sig in June shows moderate persistent disease despite infliximab.  He was transition to Entyvio which we discontinued in December for invoke.  Today he continues to complain of loose urgent bowel movements.  Earlier this week his wife messaged me with lower extremity swelling, given the concern for DVT on" we sent him to the emergency room with no evidence of DVT.  He denies any abdominal pain but does report a low-grade fever.  He is following with [facility] on a trial for a long haul COVID.  He is on prednisone 5 mg every other day.  He feels that the Rinvoq did help his ulcerative colitis symptoms, he was on a long prednisone taper during this induction as well.  Today he is still having 10-15 loose bowel movements daily.  He is on mesalamine 800 twice daily.  He is taking Imodium as well.  His symptoms are predominantly at night.  He does agree to repeat colonoscopy, and will consider Stelara induction plus or minus written RINVOQ dosing to 30 mg daily depending on his results.  MB  3/15/2023 Bony presents for [facility] of UC. Since his last visit he started colestipol 1 gm twice daily and budesonide 9mg daily. He has also been going on [facility] Tuesdays and Fridays to get fluids. He states in the past 8 days he is feeling great. He is currently on 15mg of Rinvoq. He is down to 3 BMs daily with no bleeding or mucous. He is occasionally having loose stools which he relates to eating a high sugar meal. He is on prednisone 5mg every other day. He would like to try and wean off prednisone. He feels like the Rinvoq is really helping. He wants to continue Rinvoq for now. He wants to hold off on surgery referral. He agrees to hold off on fluids unless he is having 6-8 watery stools for 3 days in a row. Today he is doing better overall. MB  This telehealth visit was provided at the patient's home. 6/21/2023 Bony presents for follow-up of ulcerative colitis and possible history of PSC.  Today he is doing better than he has in years.  He is down to a soft bowel movement once or twice daily.  He is on mesalamine 800 mg 3 times daily, colestipol twice daily, budesonide 9 mg daily, and prednisone 2 mg every other day weaning down.  His blood sugars have stabilized.  He is struggling with severe fatigue which we have discussed is likely secondary to his prednisone weaning.  Today we have discussed weaning off of his prednisone and then weaning off the budesonide.  His colonoscopy in February shows moderate to severe pancolitis.  He was originally due for surveillance in September of this year, he would like to pursue repeat colonoscopy given significant improvement in his symptoms to evaluate the reflux effect.  He is due for repeat labs, he has a questionable history of PSC although his imaging and biopsy in the past have not suggested this.  He said he did see Dr. La.  We will repeat his labs along with MRI imaging as his alkaline phosphatase has bumped up a hair.  Today he is doing well otherwise with no new GI complaints.  MB This telehealth visit was provided at the patient's home.

## 2023-06-26 LAB
A/G RATIO: 1.4
ABSOLUTE BASOPHILS: 20
ABSOLUTE EOSINOPHILS: 20
ABSOLUTE LYMPHOCYTES: 798
ABSOLUTE MONOCYTES: 616
ABSOLUTE NEUTROPHILS: 8646
ALBUMIN: 3.8
ALKALINE PHOSPHATASE: 143
ALT (SGPT): 35
AST (SGOT): 25
BASOPHILS: 0.2
BILIRUBIN, TOTAL: 0.6
BUN/CREATININE RATIO: 15
BUN: 22
C-REACTIVE PROTEIN, QUANT: 9.3
CALCIUM: 9.5
CARBON DIOXIDE, TOTAL: 24
CHLORIDE: 104
CREATININE: 1.49
EGFR: 50
EOSINOPHILS: 0.2
GLOBULIN, TOTAL: 2.8
GLUCOSE: 243
HEMATOCRIT: 38.3
HEMOGLOBIN: 12.1
HEPATITIS B SURFACE ANTIGEN: (no result)
LYMPHOCYTES: 7.9
MCH: 25.5
MCHC: 31.6
MCV: 80.6
MITOGEN-NIL: 0.27
MONOCYTES: 6.1
MPV: 11
NEUTROPHILS: 85.6
PLATELET COUNT: 333
POTASSIUM: 3.6
PROTEIN, TOTAL: 6.6
QUANTIFERON NIL VALUE: 0.03
QUANTIFERON TB1 AG VALUE: 0
QUANTIFERON TB2 AG VALUE: <0
QUANTIFERON-TB GOLD PLUS: (no result)
RDW: 15.9
RED BLOOD CELL COUNT: 4.75
SED RATE BY MODIFIED: 28
SODIUM: 141
WHITE BLOOD CELL COUNT: 10.1

## 2023-06-27 ENCOUNTER — TELEPHONE ENCOUNTER (OUTPATIENT)
Dept: URBAN - NONMETROPOLITAN AREA CLINIC 2 | Facility: CLINIC | Age: 73
End: 2023-06-27

## 2023-06-27 ENCOUNTER — WEB ENCOUNTER (OUTPATIENT)
Dept: URBAN - NONMETROPOLITAN AREA CLINIC 2 | Facility: CLINIC | Age: 73
End: 2023-06-27

## 2023-07-01 LAB
MITOGEN-NIL: 0.05
QUANTIFERON NIL VALUE: 0.04
QUANTIFERON TB1 AG VALUE: 0.01
QUANTIFERON TB2 AG VALUE: 0
QUANTIFERON-TB GOLD PLUS: (no result)

## 2023-07-05 ENCOUNTER — TELEPHONE ENCOUNTER (OUTPATIENT)
Dept: URBAN - NONMETROPOLITAN AREA CLINIC 2 | Facility: CLINIC | Age: 73
End: 2023-07-05

## 2023-07-05 ENCOUNTER — LAB OUTSIDE AN ENCOUNTER (OUTPATIENT)
Dept: URBAN - NONMETROPOLITAN AREA CLINIC 2 | Facility: CLINIC | Age: 73
End: 2023-07-05

## 2023-07-07 ENCOUNTER — WEB ENCOUNTER (OUTPATIENT)
Dept: URBAN - NONMETROPOLITAN AREA CLINIC 2 | Facility: CLINIC | Age: 73
End: 2023-07-07

## 2023-07-21 ENCOUNTER — ERX REFILL RESPONSE (OUTPATIENT)
Dept: URBAN - NONMETROPOLITAN AREA CLINIC 2 | Facility: CLINIC | Age: 73
End: 2023-07-21

## 2023-07-21 RX ORDER — BUDESONIDE 3 MG/1
3 CAP PO DAILY CAPSULE, DELAYED RELEASE PELLETS ORAL ONCE A DAY
Qty: 90 CAPSULE | Refills: 1 | OUTPATIENT

## 2023-10-03 ENCOUNTER — ERX REFILL RESPONSE (OUTPATIENT)
Dept: URBAN - NONMETROPOLITAN AREA CLINIC 2 | Facility: CLINIC | Age: 73
End: 2023-10-03

## 2023-10-03 RX ORDER — BUDESONIDE 3 MG/1
3 CAP PO DAILY CAPSULE, DELAYED RELEASE PELLETS ORAL ONCE A DAY
Qty: 90 CAPSULE | Refills: 1 | OUTPATIENT

## 2023-10-10 ENCOUNTER — WEB ENCOUNTER (OUTPATIENT)
Dept: URBAN - NONMETROPOLITAN AREA CLINIC 2 | Facility: CLINIC | Age: 73
End: 2023-10-10

## 2023-10-13 ENCOUNTER — OFFICE VISIT (OUTPATIENT)
Dept: URBAN - NONMETROPOLITAN AREA SURGERY CENTER 1 | Facility: SURGERY CENTER | Age: 73
End: 2023-10-13

## 2023-11-15 ENCOUNTER — ERX REFILL RESPONSE (OUTPATIENT)
Dept: URBAN - NONMETROPOLITAN AREA CLINIC 2 | Facility: CLINIC | Age: 73
End: 2023-11-15

## 2023-11-15 RX ORDER — MESALAMINE 800 MG/1
TAKE 1 TABLET BY MOUTH 3 TIMES A DAY TABLET, DELAYED RELEASE ORAL
Qty: 90 TABLET | Refills: 3 | OUTPATIENT

## 2023-12-12 ENCOUNTER — TELEPHONE ENCOUNTER (OUTPATIENT)
Dept: URBAN - NONMETROPOLITAN AREA CLINIC 2 | Facility: CLINIC | Age: 73
End: 2023-12-12

## 2023-12-12 RX ORDER — PREDNISONE 1 MG/1
1 TABLET TABLET ORAL EVERY OTHER DAY
Qty: 90 TABLETS | Refills: 3
Start: 2023-05-22 | End: 2024-12-06

## 2023-12-13 ENCOUNTER — OFFICE VISIT (OUTPATIENT)
Dept: URBAN - NONMETROPOLITAN AREA CLINIC 13 | Facility: CLINIC | Age: 73
End: 2023-12-13

## 2024-01-31 ENCOUNTER — TELEPHONE ENCOUNTER (OUTPATIENT)
Dept: URBAN - NONMETROPOLITAN AREA CLINIC 2 | Facility: CLINIC | Age: 74
End: 2024-01-31

## 2024-01-31 ENCOUNTER — OFFICE VISIT (OUTPATIENT)
Dept: URBAN - METROPOLITAN AREA TELEHEALTH 2 | Facility: TELEHEALTH | Age: 74
End: 2024-01-31
Payer: MEDICARE

## 2024-01-31 DIAGNOSIS — R19.7 ACUTE DIARRHEA: ICD-10-CM

## 2024-01-31 DIAGNOSIS — K21.9 ACID REFLUX: ICD-10-CM

## 2024-01-31 DIAGNOSIS — R74.8 ELEVATED LIVER ENZYMES: ICD-10-CM

## 2024-01-31 DIAGNOSIS — K51.018 ULCERATIVE PANCOLITIS WITH OTHER COMPLICATION: ICD-10-CM

## 2024-01-31 PROBLEM — 442618008: Status: ACTIVE | Noted: 2023-07-05

## 2024-01-31 PROBLEM — 707724006: Status: ACTIVE | Noted: 2023-01-19

## 2024-01-31 PROBLEM — 62315008: Status: ACTIVE | Noted: 2021-09-16

## 2024-01-31 PROBLEM — 404223003: Status: ACTIVE | Noted: 2024-01-31

## 2024-01-31 PROBLEM — 444548001: Status: ACTIVE | Noted: 2021-09-16

## 2024-01-31 PROBLEM — 305058001: Status: ACTIVE | Noted: 2021-09-16

## 2024-01-31 PROBLEM — 5891000119102: Status: ACTIVE | Noted: 2021-12-06

## 2024-01-31 PROCEDURE — 99214 OFFICE O/P EST MOD 30 MIN: CPT | Performed by: NURSE PRACTITIONER

## 2024-01-31 RX ORDER — BUDESONIDE 3 MG/1
3 CAP PO DAILY CAPSULE, DELAYED RELEASE PELLETS ORAL ONCE A DAY
Qty: 90 CAPSULE | Refills: 1 | Status: ACTIVE | COMMUNITY

## 2024-01-31 RX ORDER — COLESTIPOL HYDROCHLORIDE 1 G/1
1 PO BID TABLET, FILM COATED ORAL BID
Qty: 180 TABLET | Refills: 3 | Status: ACTIVE | COMMUNITY
Start: 2023-02-24

## 2024-01-31 RX ORDER — PREDNISONE 1 MG/1
1 TABLET TABLET ORAL EVERY OTHER DAY
Qty: 90 TABLETS | Refills: 3 | Status: ACTIVE | COMMUNITY
Start: 2023-05-22 | End: 2024-12-06

## 2024-01-31 RX ORDER — MIRIKIZUMAB-MRKZ 100 MG/ML
AS DIRECTED INJECTION, SOLUTION SUBCUTANEOUS
Qty: 2 UNSPECIFIED | Refills: 11 | OUTPATIENT
Start: 2024-02-07 | End: 2025-01-08

## 2024-01-31 RX ORDER — METRONIDAZOLE 500 MG/1
1 TABLET TABLET ORAL THREE TIMES A DAY
Qty: 30 TABLETS | Refills: 0 | Status: ACTIVE | COMMUNITY

## 2024-01-31 RX ORDER — MIRIKIZUMAB-MRKZ 20 MG/ML
300MG/15ML IV AT WEEK 0,4,8, THEN 200MGSUBCUTANEOUSEVERY 4 WEEKS, 4 WEEKS AFTER LAST INFUSION INJECTION, SOLUTION INTRAVENOUS
Qty: 1 UNSPECIFIED | Refills: 2 | OUTPATIENT
Start: 2024-02-07 | End: 2024-05-07

## 2024-01-31 RX ORDER — MESALAMINE 800 MG/1
TAKE 1 TABLET BY MOUTH 3 TIMES A DAY TABLET, DELAYED RELEASE ORAL
Qty: 90 TABLET | Refills: 3 | Status: ACTIVE | COMMUNITY

## 2024-01-31 RX ORDER — FAMOTIDINE 40 MG/1
TAKE 1 TABLET BY MOUTH TWICE A DAY TABLET, FILM COATED ORAL TWICE A DAY
Qty: 180 TABLET | Refills: 3 | Status: ACTIVE | COMMUNITY

## 2024-01-31 RX ORDER — USTEKINUMAB 90 MG/ML
AS DIRECTED INJECTION, SOLUTION SUBCUTANEOUS
Qty: 1 PEN NEEDLE | Refills: 6 | Status: ACTIVE | COMMUNITY
Start: 2023-02-15 | End: 2024-03-13

## 2024-01-31 RX ORDER — PROPRANOLOL HYDROCHLORIDE 40 MG/1
AS DIRECTED TABLET ORAL
Status: ACTIVE | COMMUNITY

## 2024-01-31 NOTE — HPI-TODAY'S VISIT:
Mr. Pavon is a 71-year-old male who is referred by Dr. Justin Hinson for consultation of ulcerative colitis, PSC, and diarrhea.  A copy of this note will be sent to the referring physician.  He states all of his issues began in January 2020.  He developed a viral upper respiratory disease which was likely Covid at the time but not diagnosed.  He has had a downhill drop of his health since.  Over the course of the following months he develops gastrointestinal distress.  He underwent cholecystectomy at [facility] in August 2020 with no relief.  He then had a syncopal episode later that fall and was found to be profoundly anemic.  He had an EGD and colonoscopy by Dr. Becerril and was diagnosed with ulcerative colitis in November 2020.  He was treated with steroids, mesalamine, and Humira.  He has been on and off of prednisone since.  He states so long as he is on prednisone he has 1-3 formed bowel movements daily, however anytime he comes off he goes up to 30 bowel movements daily.  His last flare started 6 to 8 weeks ago, he called Dr. Becerril's office and was placed on 20 mg of prednisone with a slow taper.  He is down to 10 mg daily and having 3-4 urgent bowel movements with some incontinence.  He does report mucus but no bleeding, he does occasionally have nocturnal diarrhea.  This spring he continued to have elevated liver enzymes, he underwent an evaluation for questionable IPMN versus PSC.  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  He was placed on azathioprine along with ursodiol.  Since this summer he continues to struggle with his GI complaints.  He is on Florastor daily.  He is taking mesalamine 2 capsules 3 times daily.  He is on Humira every other week.  He has seen no improvement in his symptoms since starting azathioprine this spring.  Unfortunately we have none of Dr. Becerril's records including his EGD, colonoscopy, pathology, liver biopsy, or imaging.  He is also undergoing neurologic work-up for tremor, however looking back this all seemed to start post viral, and has worsened with steroids.  His blood sugars have been labile along with his blood pressure.  Today he is not doing well and is here for a second opinion.  MB   1/11/2022 Bony presents for follow-up of ulcerative colitis, questionable history of primary sclerosing cholangitis, and C. difficile. Since his last visit he underwent colonoscopy in September by Dr. Willis with moderate pancolitis. He was transitioned off Humira and started on steroids and Inflectra. After receiving 2 of his induction doses he developed progressive diarrhea. He was tested and found to be C. difficile positive. He completed a course of vancomycin with recurrent symptoms and treated twice. He underwent his third infusion with a flare of severe diarrhea in the third week in December. He was admitted and found to be toxin negative but antigen and PCR positive. He underwent flexible sigmoidoscopy with no significant pseudomembranous colitis. His steroids were discontinued and he was placed on a vancomycin taper by Dr. Carrillo. Today his diarrhea is slowing down. Yesterday he states he had around 15 loose bowel movements. Today he has only had approximately 6. He is not taking Florastor. He has been treated with colestipol in the past but is not taking this recently. I was able to review his liver biopsy ordered by Dr. Becerril, it is unclear that he has PSC from his liver biopsy. He does agree to a second opinion with hepatology at [facility]. 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 He does agree to pursue Dificid plus or minus monoclonal antibody infusion if he has recurrence. He does agree to see infectious disease. We had a long discussion regarding dehydration. He does agree to fluids and repeat blood work this week. He understands that if he develops severe diarrhea abdominal cramping fever or distention that he is to proceed back to the emergency department. MB 1/25/2022 The patient presents today for follow-up of his ulcerative colitis and C. difficile with severe diarrhea.  Since our last visit, he has not been doing well.  He has been readmitted to the hospital secondary to diarrhea.  He was found to have severe C. difficile colitis.  He is having 10-20 bowel movements daily.  He denies any blood in his stool.  He denies any abdominal pain.  On flexible sigmoidoscopy, this was felt to be secondary to C. difficile.  He is tapering down his vancomycin, and he is taking colestipol twice a day.  We have discussed starting on cholestyramine 4 times a day and changing him to Dificid.  He does have a follow-up with infectious disease.  We are also going to check his labs today to make sure he is not dehydrated, and to make sure he does not need to be admitted for IV fluids.  At this point, we have discussed advancing his diet as tolerated.  Increasing his cholestyramine and adding Dificid.  Until the Dificid is improved, I do want him to take the vancomycin 4 times a day.  I have discussed this plan with the patient and his wife, and they are in agreement.  3/2/2022 Bony presents for follow up of UC and c. diff. SInce his last visit he did see ID. He never went on dificid and never had the monoclonal antibody as his repeat c. diff was negative, he completed the vanc taper and had the monoclonal ab infusion. He is doing better during the day, still loose urgent stools after meals. However at night he is having a BM every 45 minutes to an hour with tensmus. His last dose of inflectra 12/21 at 5mg/kg, that completed his induction. He is currently taking orally mesalamine 800mg TID. He is not taking the cholestyramine. His diarrhea at night is the worse. He doesn't feel like this c. diff diarrhea. Today he is still struggling with diarrhea at night and is due for infliximab. MB  This televisit was performed at the patient's home. 3/30/2022 Bony presents for follow-up of ulcerative colitis, C. difficile, reflux, diarrhea.  Since his last visit he continues to slowly improve.  He did follow-up with Dr. Landau with no indication of monoclonal antibody and repeat negative stool for C. difficile.  Currently he got his second maintenance dose of infliximab last week.  He is down to 4-5 loose bowel movements daily.  He is struggling with tenesmus but this is slowly improving.  He is almost done with his 8-week course of budesonide.  He is on cholestyramine twice daily, Florastor twice daily, and mesalamine 800 p.o. 3 times daily.  His reflux has been flaring since being off pantoprazole.  He does have some odynophagia.  I am concerned he may have a component of Candida.  He agrees to empiric treatment of Diflucan for 14 days.  His last EGD was in 2020 with reflux and gastritis by Dr. Becerril.  He is still awaiting his appointment with hepatology.  He is off azathioprine and Miles for now.  He is due for repeat blood work today.  I do want him to keep a stool study at home in case he has further symptoms of C. difficile as he lives an hour away.  We will see each other again after his next maintenance dose infusion and continue to monitor his symptoms closely.  MB   5/4/2022 Bony presents for follow up of UC, C. diff, reflux and diarrhea. He states last week he states he was feeling well. He was have a soft BM a few times a day. 6 days ago he developed acute urgent and watery diarrhea with a BM 20-30 times daily. He says he has incredible brain fog and incredible fatigue. He slept for 26 hours. He is up all night using the restroom. He is due for his next infliximab on 5/15. He denies any abdominal pain. He is very confused and is halucinating. Today he is very weak, he is down to 176lb. He is eating but not able to keep much down in regard to his diarrhea. MB 6/22/2022 Bony presents for follow-up of ulcerative colitis.  Since his last visit his repeat drug titers show elevated serum antibody and 0 serum drug level.  We increased his infliximab dose to 10 mg/kg and have scheduled this every 6 weeks.  He received a dose on May 25 and had also started a prednisone taper which she is finishing up this week down to 5 mg.  His flexible sigmoidoscopy in June shows moderate left-sided disease, his biopsies actually showed mildly active disease.  Today we have discussed his work-up.  He agrees to repeat his infliximab titers at trough to see if he has any further antibody development and to see if his serum drug level is appropriate at the higher dose.  If not he agrees to consider via Zaria Jules.  If so he agrees to give the infliximab a few more doses to see if things improve.  He can continues the mesalamine 3 times daily, the Florastor twice daily, and the cholestyramine twice daily.  MB 9/2/2022 Bony presents for follow-up of ulcerative colitis.  Since his last visit his repeat infliximab titers done June 29 at trough shows a low titer of antibody development and a low serum drug level even despite 10 mg/kg every 6-week dosing.  He did receive 2 doses at the high dose at the increased interval.  His repeat flexible sigmoidoscopy in June reveals left-sided moderate disease.  We made the decision to transition to Entyvio.  Since his last visit he has gotten 1 dose of Entyvio.  He did have significant arthralgias after the infusion however he also stopped his prednisone taper 2 days prior to starting the Entyvio.  He contacted me earlier this week with up to 10 watery bowel movements again daily.  We restarted a prednisone taper and his diarrhea is better today.  His arthralgias are also better.  His blood sugars remain labile however he has been managing this with insulin.  Today we have discussed his plan.  We will plan to proceed with induction of Entyvio.  I will see him after induction and check his titers.  If he does not have appropriate serum drug level would consider every 4 week dosing depending on his sequela.  MB 9/20/2022 The patient presents today for follow-up of his ulcerative colitis.  Since her last visit, we switched him from infliximab to Entyvio.  He has gotten 2 doses of Entyvio.  After both of his doses, he developed significant arthralgias.  Also, over the past 5 days, he has been off prednisone.  He is having up to 20 loose watery bowel movements daily.  He feels extremely weak and lethargic.  On his last colonoscopy, he was found to have active colitis.  At this point, we have discussed several options.  We have discussed proceeding with a Yuan inhibitor with Rinvoq.  We are going to try to get this approved through his insurance.  In the meantime, I am going to give him another prednisone taper.  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  They could be a component of coming off prednisone.  He also feels that he has long COVID, and he is wondering if this is contributing to his symptoms as well.  Fortunately, his labs are fairly normal other than his creatinine has been mildly elevated.  Today, we are going to switch him from Entyvio to Rinvoq and proceed with prednisone taper.  We have discussed the other option would possibly be Stelara. 11/16/2022 Bony presents follow up of UC. He has not gotten the Rinvoq because he has not uploaded his medicare card to their system. He and his wife will perform this today. Per haja's discussion with fadia kapoor he will be approved once they recieve this information. He is down to 5mg of prednisone and doing fairly well. He is having 1-3 urgent BMs most days. He is fatigued. His blood sugars are still labile. Today he agrees to pursue uploading the required documentation to obtain assistance and let me know when he receives therapy. MB  This telehealth visit was provided at the patient's home. 2/8/2023 Bony presents for follow-up of ulcerative colitis.  Since his last visit he started Cristela Skyler December 2.  He started at 45 mg daily which she took for 8 weeks.  On February 3 he decreased down to 15 mg daily for his maintenance dosing.  He subsequently developed increased urgent loose watery diarrhea.  He was having up to 15 bowel movements typically through the night.  His stool studies are negative for C. difficile but positive fecal calprotectin over 500.  His colonoscopy earlier this year shows pancolitis, his flex sig in June shows moderate persistent disease despite infliximab.  He was transition to Entyvio which we discontinued in December for invoke.  Today he continues to complain of loose urgent bowel movements.  Earlier this week his wife messaged me with lower extremity swelling, given the concern for DVT on" we sent him to the emergency room with no evidence of DVT.  He denies any abdominal pain but does report a low-grade fever.  He is following with [facility] on a trial for a long haul COVID.  He is on prednisone 5 mg every other day.  He feels that the Rinvoq did help his ulcerative colitis symptoms, he was on a long prednisone taper during this induction as well.  Today he is still having 10-15 loose bowel movements daily.  He is on mesalamine 800 twice daily.  He is taking Imodium as well.  His symptoms are predominantly at night.  He does agree to repeat colonoscopy, and will consider Stelara induction plus or minus written RINVOQ dosing to 30 mg daily depending on his results.  MB  3/15/2023 Bony presents for [facility] of UC. Since his last visit he started colestipol 1 gm twice daily and budesonide 9mg daily. He has also been going on [facility] Tuesdays and Fridays to get fluids. He states in the past 8 days he is feeling great. He is currently on 15mg of Rinvoq. He is down to 3 BMs daily with no bleeding or mucous. He is occasionally having loose stools which he relates to eating a high sugar meal. He is on prednisone 5mg every other day. He would like to try and wean off prednisone. He feels like the Rinvoq is really helping. He wants to continue Rinvoq for now. He wants to hold off on surgery referral. He agrees to hold off on fluids unless he is having 6-8 watery stools for 3 days in a row. Today he is doing better overall. MB  This telehealth visit was provided at the patient's home. 6/21/2023 Bony presents for follow-up of ulcerative colitis and possible history of PSC.  Today he is doing better than he has in years.  He is down to a soft bowel movement once or twice daily.  He is on mesalamine 800 mg 3 times daily, colestipol twice daily, budesonide 9 mg daily, and prednisone 2 mg every other day weaning down.  His blood sugars have stabilized.  He is struggling with severe fatigue which we have discussed is likely secondary to his prednisone weaning.  Today we have discussed weaning off of his prednisone and then weaning off the budesonide.  His colonoscopy in February shows moderate to severe pancolitis.  He was originally due for surveillance in September of this year, he would like to pursue repeat colonoscopy given significant improvement in his symptoms to evaluate the reflux effect.  He is due for repeat labs, he has a questionable history of PSC although his imaging and biopsy in the past have not suggested this.  He said he did see Dr. La.  We will repeat his labs along with MRI imaging as his alkaline phosphatase has bumped up a hair.  Today he is doing well otherwise with no new GI complaints.  MB This telehealth visit was provided at the patient's home. 1/31/2024 Bony presents for follow-up of ulcerative colitis, history of indeterminate QuantiFERON, history of C. difficile, and questionable history of PSC.  Since his last visit he has been lost to follow-up.  In July he developed acute heart failure and subsequent DVT.  He was placed on Xarelto.  Unfortunately he was discharged after this hospitalization and fell and broke his hip.  He is going through orthopedic surgery for his hip and continues to struggle with chronic pain.  He continues to struggle with weakness and shortness of breath and is recently been diagnosed with aortic valve stenosis and is being worked up by cardiology and pulmonology.  He has limited mobility.  He is on Rinvoq 15 mg daily, mesalamine 800 mg 3 times daily, budesonide 9 mg daily, and 1 mg of prednisone every other day.  When he stops the budesonide his diarrhea returns.  When he is on this regimen he has 1-2 soft bowel movements daily.  He did not have his repeat surveillance colonoscopy as previously ordered due to his multiple health conditions.  He feels that his colitis is actually under control and is hesitant to discontinue", we have discussed the significant risk of cardiovascular death related to Yuan inhibitors particularly in patients with cardiovascular risk factors.  He has been evaluated by palliative care and is undergoing intake today.  Today overall he feels fairly well on his current regimen.  I will review with Dr. Lilly and we will determine if we need to transition him off of Yuan inhibitors given his part cardiovascular risk factors.  He is not a candidate for sedation at this point given his aortic stenosis, shortness of breath, and worsening cardiovascular and pulmonary condition.  Will discuss at his follow-up depending on his clinical course.  MB

## 2024-03-06 ENCOUNTER — TELPHEP (OUTPATIENT)
Dept: URBAN - METROPOLITAN AREA TELEHEALTH 2 | Facility: TELEHEALTH | Age: 74
End: 2024-03-06
Payer: MEDICARE

## 2024-03-06 DIAGNOSIS — R19.7 DIARRHEA, UNSPECIFIED TYPE: ICD-10-CM

## 2024-03-06 DIAGNOSIS — K51.018 ULCERATIVE PANCOLITIS WITH OTHER COMPLICATION: ICD-10-CM

## 2024-03-06 DIAGNOSIS — K21.9 GASTROESOPHAGEAL REFLUX DISEASE, UNSPECIFIED WHETHER ESOPHAGITIS PRESENT: ICD-10-CM

## 2024-03-06 DIAGNOSIS — K83.01 PSC (PRIMARY SCLEROSING CHOLANGITIS): ICD-10-CM

## 2024-03-06 PROCEDURE — 99443 PHONE E/M BY PHYS 21-30 MIN: CPT | Performed by: NURSE PRACTITIONER

## 2024-03-06 PROCEDURE — 99214 OFFICE O/P EST MOD 30 MIN: CPT | Performed by: NURSE PRACTITIONER

## 2024-03-06 RX ORDER — USTEKINUMAB 90 MG/ML
AS DIRECTED INJECTION, SOLUTION SUBCUTANEOUS
Qty: 1 PEN NEEDLE | Refills: 6 | Status: ACTIVE | COMMUNITY
Start: 2023-02-15 | End: 2024-03-13

## 2024-03-06 RX ORDER — COLESTIPOL HYDROCHLORIDE 1 G/1
1 PO BID TABLET, FILM COATED ORAL BID
Qty: 180 TABLET | Refills: 3 | Status: ACTIVE | COMMUNITY
Start: 2023-02-24

## 2024-03-06 RX ORDER — MIRIKIZUMAB-MRKZ 100 MG/ML
AS DIRECTED INJECTION, SOLUTION SUBCUTANEOUS
Qty: 2 UNSPECIFIED | Refills: 11
Start: 2024-02-07 | End: 2025-02-05

## 2024-03-06 RX ORDER — METRONIDAZOLE 500 MG/1
1 TABLET TABLET ORAL THREE TIMES A DAY
Qty: 30 TABLETS | Refills: 0 | Status: ACTIVE | COMMUNITY

## 2024-03-06 RX ORDER — PROPRANOLOL HYDROCHLORIDE 40 MG/1
AS DIRECTED TABLET ORAL
Status: ACTIVE | COMMUNITY

## 2024-03-06 RX ORDER — FAMOTIDINE 40 MG/1
TAKE 1 TABLET BY MOUTH TWICE A DAY TABLET, FILM COATED ORAL TWICE A DAY
Qty: 180 TABLET | Refills: 3 | Status: ACTIVE | COMMUNITY

## 2024-03-06 RX ORDER — MESALAMINE 800 MG/1
TAKE 1 TABLET BY MOUTH 3 TIMES A DAY TABLET, DELAYED RELEASE ORAL
Qty: 90 TABLET | Refills: 3 | Status: ACTIVE | COMMUNITY

## 2024-03-06 RX ORDER — PREDNISONE 1 MG/1
1 TABLET TABLET ORAL EVERY OTHER DAY
Qty: 90 TABLETS | Refills: 3
Start: 2023-05-22 | End: 2025-03-01

## 2024-03-06 RX ORDER — MIRIKIZUMAB-MRKZ 20 MG/ML
300MG/15ML IV AT WEEK 0,4,8, THEN 200MGSUBCUTANEOUSEVERY 4 WEEKS, 4 WEEKS AFTER LAST INFUSION INJECTION, SOLUTION INTRAVENOUS
Qty: 1 UNSPECIFIED | Refills: 2 | Status: ACTIVE | COMMUNITY
Start: 2024-02-07 | End: 2024-05-07

## 2024-03-06 RX ORDER — PREDNISONE 1 MG/1
1 TABLET TABLET ORAL EVERY OTHER DAY
Qty: 90 TABLETS | Refills: 3 | Status: ACTIVE | COMMUNITY
Start: 2023-05-22 | End: 2024-12-06

## 2024-03-06 RX ORDER — MIRIKIZUMAB-MRKZ 20 MG/ML
300MG/15ML IV AT WEEK 0,4,8, THEN 200MGSUBCUTANEOUSEVERY 4 WEEKS, 4 WEEKS AFTER LAST INFUSION INJECTION, SOLUTION INTRAVENOUS
Qty: 1 UNSPECIFIED | Refills: 2
Start: 2024-02-07 | End: 2024-06-04

## 2024-03-06 RX ORDER — MIRIKIZUMAB-MRKZ 100 MG/ML
AS DIRECTED INJECTION, SOLUTION SUBCUTANEOUS
Qty: 2 UNSPECIFIED | Refills: 11 | Status: ACTIVE | COMMUNITY
Start: 2024-02-07 | End: 2025-01-08

## 2024-03-06 RX ORDER — BUDESONIDE 3 MG/1
3 CAP PO DAILY CAPSULE, DELAYED RELEASE PELLETS ORAL ONCE A DAY
Qty: 90 CAPSULE | Refills: 1 | Status: ACTIVE | COMMUNITY

## 2024-03-06 NOTE — HPI-TODAY'S VISIT:
Mr. Pavon is a 71-year-old male who is referred by Dr. Justin Hinson for consultation of ulcerative colitis, PSC, and diarrhea.  A copy of this note will be sent to the referring physician.  He states all of his issues began in January 2020.  He developed a viral upper respiratory disease which was likely Covid at the time but not diagnosed.  He has had a downhill drop of his health since.  Over the course of the following months he develops gastrointestinal distress.  He underwent cholecystectomy at [facility] in August 2020 with no relief.  He then had a syncopal episode later that fall and was found to be profoundly anemic.  He had an EGD and colonoscopy by Dr. Becerril and was diagnosed with ulcerative colitis in November 2020.  He was treated with steroids, mesalamine, and Humira.  He has been on and off of prednisone since.  He states so long as he is on prednisone he has 1-3 formed bowel movements daily, however anytime he comes off he goes up to 30 bowel movements daily.  His last flare started 6 to 8 weeks ago, he called Dr. Becerril's office and was placed on 20 mg of prednisone with a slow taper.  He is down to 10 mg daily and having 3-4 urgent bowel movements with some incontinence.  He does report mucus but no bleeding, he does occasionally have nocturnal diarrhea.  This spring he continued to have elevated liver enzymes, he underwent an evaluation for questionable IPMN versus PSC.  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  He was placed on azathioprine along with ursodiol.  Since this summer he continues to struggle with his GI complaints.  He is on Florastor daily.  He is taking mesalamine 2 capsules 3 times daily.  He is on Humira every other week.  He has seen no improvement in his symptoms since starting azathioprine this spring.  Unfortunately we have none of Dr. Becerril's records including his EGD, colonoscopy, pathology, liver biopsy, or imaging.  He is also undergoing neurologic work-up for tremor, however looking back this all seemed to start post viral, and has worsened with steroids.  His blood sugars have been labile along with his blood pressure.  Today he is not doing well and is here for a second opinion.  MB   1/11/2022 Bony presents for follow-up of ulcerative colitis, questionable history of primary sclerosing cholangitis, and C. difficile. Since his last visit he underwent colonoscopy in September by Dr. Willis with moderate pancolitis. He was transitioned off Humira and started on steroids and Inflectra. After receiving 2 of his induction doses he developed progressive diarrhea. He was tested and found to be C. difficile positive. He completed a course of vancomycin with recurrent symptoms and treated twice. He underwent his third infusion with a flare of severe diarrhea in the third week in December. He was admitted and found to be toxin negative but antigen and PCR positive. He underwent flexible sigmoidoscopy with no significant pseudomembranous colitis. His steroids were discontinued and he was placed on a vancomycin taper by Dr. Carrillo. Today his diarrhea is slowing down. Yesterday he states he had around 15 loose bowel movements. Today he has only had approximately 6. He is not taking Florastor. He has been treated with colestipol in the past but is not taking this recently. I was able to review his liver biopsy ordered by Dr. Becerril, it is unclear that he has PSC from his liver biopsy. He does agree to a second opinion with hepatology at [facility]. 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 He does agree to pursue Dificid plus or minus monoclonal antibody infusion if he has recurrence. He does agree to see infectious disease. We had a long discussion regarding dehydration. He does agree to fluids and repeat blood work this week. He understands that if he develops severe diarrhea abdominal cramping fever or distention that he is to proceed back to the emergency department. MB 1/25/2022 The patient presents today for follow-up of his ulcerative colitis and C. difficile with severe diarrhea.  Since our last visit, he has not been doing well.  He has been readmitted to the hospital secondary to diarrhea.  He was found to have severe C. difficile colitis.  He is having 10-20 bowel movements daily.  He denies any blood in his stool.  He denies any abdominal pain.  On flexible sigmoidoscopy, this was felt to be secondary to C. difficile.  He is tapering down his vancomycin, and he is taking colestipol twice a day.  We have discussed starting on cholestyramine 4 times a day and changing him to Dificid.  He does have a follow-up with infectious disease.  We are also going to check his labs today to make sure he is not dehydrated, and to make sure he does not need to be admitted for IV fluids.  At this point, we have discussed advancing his diet as tolerated.  Increasing his cholestyramine and adding Dificid.  Until the Dificid is improved, I do want him to take the vancomycin 4 times a day.  I have discussed this plan with the patient and his wife, and they are in agreement.  3/2/2022 Bony presents for follow up of UC and c. diff. SInce his last visit he did see ID. He never went on dificid and never had the monoclonal antibody as his repeat c. diff was negative, he completed the vanc taper and had the monoclonal ab infusion. He is doing better during the day, still loose urgent stools after meals. However at night he is having a BM every 45 minutes to an hour with tensmus. His last dose of inflectra 12/21 at 5mg/kg, that completed his induction. He is currently taking orally mesalamine 800mg TID. He is not taking the cholestyramine. His diarrhea at night is the worse. He doesn't feel like this c. diff diarrhea. Today he is still struggling with diarrhea at night and is due for infliximab. MB  This televisit was performed at the patient's home. 3/30/2022 Bony presents for follow-up of ulcerative colitis, C. difficile, reflux, diarrhea.  Since his last visit he continues to slowly improve.  He did follow-up with Dr. Landau with no indication of monoclonal antibody and repeat negative stool for C. difficile.  Currently he got his second maintenance dose of infliximab last week.  He is down to 4-5 loose bowel movements daily.  He is struggling with tenesmus but this is slowly improving.  He is almost done with his 8-week course of budesonide.  He is on cholestyramine twice daily, Florastor twice daily, and mesalamine 800 p.o. 3 times daily.  His reflux has been flaring since being off pantoprazole.  He does have some odynophagia.  I am concerned he may have a component of Candida.  He agrees to empiric treatment of Diflucan for 14 days.  His last EGD was in 2020 with reflux and gastritis by Dr. Becerril.  He is still awaiting his appointment with hepatology.  He is off azathioprine and Miles for now.  He is due for repeat blood work today.  I do want him to keep a stool study at home in case he has further symptoms of C. difficile as he lives an hour away.  We will see each other again after his next maintenance dose infusion and continue to monitor his symptoms closely.  MB   5/4/2022 Bony presents for follow up of UC, C. diff, reflux and diarrhea. He states last week he states he was feeling well. He was have a soft BM a few times a day. 6 days ago he developed acute urgent and watery diarrhea with a BM 20-30 times daily. He says he has incredible brain fog and incredible fatigue. He slept for 26 hours. He is up all night using the restroom. He is due for his next infliximab on 5/15. He denies any abdominal pain. He is very confused and is halucinating. Today he is very weak, he is down to 176lb. He is eating but not able to keep much down in regard to his diarrhea. MB 6/22/2022 Bony presents for follow-up of ulcerative colitis.  Since his last visit his repeat drug titers show elevated serum antibody and 0 serum drug level.  We increased his infliximab dose to 10 mg/kg and have scheduled this every 6 weeks.  He received a dose on May 25 and had also started a prednisone taper which she is finishing up this week down to 5 mg.  His flexible sigmoidoscopy in June shows moderate left-sided disease, his biopsies actually showed mildly active disease.  Today we have discussed his work-up.  He agrees to repeat his infliximab titers at trough to see if he has any further antibody development and to see if his serum drug level is appropriate at the higher dose.  If not he agrees to consider via Zaria Jules.  If so he agrees to give the infliximab a few more doses to see if things improve.  He can continues the mesalamine 3 times daily, the Florastor twice daily, and the cholestyramine twice daily.  MB 9/2/2022 Bony presents for follow-up of ulcerative colitis.  Since his last visit his repeat infliximab titers done June 29 at trough shows a low titer of antibody development and a low serum drug level even despite 10 mg/kg every 6-week dosing.  He did receive 2 doses at the high dose at the increased interval.  His repeat flexible sigmoidoscopy in June reveals left-sided moderate disease.  We made the decision to transition to Entyvio.  Since his last visit he has gotten 1 dose of Entyvio.  He did have significant arthralgias after the infusion however he also stopped his prednisone taper 2 days prior to starting the Entyvio.  He contacted me earlier this week with up to 10 watery bowel movements again daily.  We restarted a prednisone taper and his diarrhea is better today.  His arthralgias are also better.  His blood sugars remain labile however he has been managing this with insulin.  Today we have discussed his plan.  We will plan to proceed with induction of Entyvio.  I will see him after induction and check his titers.  If he does not have appropriate serum drug level would consider every 4 week dosing depending on his sequela.  MB 9/20/2022 The patient presents today for follow-up of his ulcerative colitis.  Since her last visit, we switched him from infliximab to Entyvio.  He has gotten 2 doses of Entyvio.  After both of his doses, he developed significant arthralgias.  Also, over the past 5 days, he has been off prednisone.  He is having up to 20 loose watery bowel movements daily.  He feels extremely weak and lethargic.  On his last colonoscopy, he was found to have active colitis.  At this point, we have discussed several options.  We have discussed proceeding with a Yuan inhibitor with Rinvoq.  We are going to try to get this approved through his insurance.  In the meantime, I am going to give him another prednisone taper.  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  They could be a component of coming off prednisone.  He also feels that he has long COVID, and he is wondering if this is contributing to his symptoms as well.  Fortunately, his labs are fairly normal other than his creatinine has been mildly elevated.  Today, we are going to switch him from Entyvio to Rinvoq and proceed with prednisone taper.  We have discussed the other option would possibly be Stelara. 11/16/2022 Bony presents follow up of UC. He has not gotten the Rinvoq because he has not uploaded his medicare card to their system. He and his wife will perform this today. Per haja's discussion with fadia kapoor he will be approved once they recieve this information. He is down to 5mg of prednisone and doing fairly well. He is having 1-3 urgent BMs most days. He is fatigued. His blood sugars are still labile. Today he agrees to pursue uploading the required documentation to obtain assistance and let me know when he receives therapy. MB  This telehealth visit was provided at the patient's home. 2/8/2023 Bony presents for follow-up of ulcerative colitis.  Since his last visit he started Cristela Skyler December 2.  He started at 45 mg daily which she took for 8 weeks.  On February 3 he decreased down to 15 mg daily for his maintenance dosing.  He subsequently developed increased urgent loose watery diarrhea.  He was having up to 15 bowel movements typically through the night.  His stool studies are negative for C. difficile but positive fecal calprotectin over 500.  His colonoscopy earlier this year shows pancolitis, his flex sig in June shows moderate persistent disease despite infliximab.  He was transition to Entyvio which we discontinued in December for invoke.  Today he continues to complain of loose urgent bowel movements.  Earlier this week his wife messaged me with lower extremity swelling, given the concern for DVT on" we sent him to the emergency room with no evidence of DVT.  He denies any abdominal pain but does report a low-grade fever.  He is following with [facility] on a trial for a long haul COVID.  He is on prednisone 5 mg every other day.  He feels that the Rinvoq did help his ulcerative colitis symptoms, he was on a long prednisone taper during this induction as well.  Today he is still having 10-15 loose bowel movements daily.  He is on mesalamine 800 twice daily.  He is taking Imodium as well.  His symptoms are predominantly at night.  He does agree to repeat colonoscopy, and will consider Stelara induction plus or minus written RINVOQ dosing to 30 mg daily depending on his results.  MB  3/15/2023 Bony presents for [facility] UC. Since his last visit he started colestipol 1 gm twice daily and budesonide 9mg daily. He has also been going on [facility] Tuesdays and Fridays to get fluids. He states in the past 8 days he is feeling great. He is currently on 15mg of Rinvoq. He is down to 3 BMs daily with no bleeding or mucous. He is occasionally having loose stools which he relates to eating a high sugar meal. He is on prednisone 5mg every other day. He would like to try and wean off prednisone. He feels like the Rinvoq is really helping. He wants to continue Rinvoq for now. He wants to hold off on surgery referral. He agrees to hold off on fluids unless he is having 6-8 watery stools for 3 days in a row. Today he is doing better overall. MB  This telehealth visit was provided at the patient's home. 6/21/2023 Bony presents for follow-up of ulcerative colitis and possible history of PSC.  Today he is doing better than he has in years.  He is down to a soft bowel movement once or twice daily.  He is on mesalamine 800 mg 3 times daily, colestipol twice daily, budesonide 9 mg daily, and prednisone 2 mg every other day weaning down.  His blood sugars have stabilized.  He is struggling with severe fatigue which we have discussed is likely secondary to his prednisone weaning.  Today we have discussed weaning off of his prednisone and then weaning off the budesonide.  His colonoscopy in February shows moderate to severe pancolitis.  He was originally due for surveillance in September of this year, he would like to pursue repeat colonoscopy given significant improvement in his symptoms to evaluate the reflux effect.  He is due for repeat labs, he has a questionable history of PSC although his imaging and biopsy in the past have not suggested this.  He said he did see Dr. La.  We will repeat his labs along with MRI imaging as his alkaline phosphatase has bumped up a hair.  Today he is doing well otherwise with no new GI complaints.  MB This telehealth visit was provided at the patient's home. 1/31/2024 Bony presents for follow-up of ulcerative colitis, history of indeterminate QuantiFERON, history of C. difficile, and questionable history of PSC.  Since his last visit he has been lost to follow-up.  In July he developed acute heart failure and subsequent DVT.  He was placed on Xarelto.  Unfortunately he was discharged after this hospitalization and fell and broke his hip.  He is going through orthopedic surgery for his hip and continues to struggle with chronic pain.  He continues to struggle with weakness and shortness of breath and is recently been diagnosed with aortic valve stenosis and is being worked up by cardiology and pulmonology.  He has limited mobility.  He is on Rinvoq 15 mg daily, mesalamine 800 mg 3 times daily, budesonide 9 mg daily, and 1 mg of prednisone every other day.  When he stops the budesonide his diarrhea returns.  When he is on this regimen he has 1-2 soft bowel movements daily.  He did not have his repeat surveillance colonoscopy as previously ordered due to his multiple health conditions.  He feels that his colitis is actually under control and is hesitant to discontinue", we have discussed the significant risk of cardiovascular death related to Yuan inhibitors particularly in patients with cardiovascular risk factors.  He has been evaluated by palliative care and is undergoing intake today.  Today overall he feels fairly well on his current regimen.  I will review with Dr. Lilly and we will determine if we need to transition him off of Yuan inhibitors given his part cardiovascular risk factors.  He is not a candidate for sedation at this point given his aortic stenosis, shortness of breath, and worsening cardiovascular and pulmonary condition.  Will discuss at his follow-up depending on his clinical course.  MB 3/6/2024 Bony presents for follow-up of ulcerative colitis.  Since his last visit we had a long discussion regarding his cardiovascular risks.  He is scheduled for hip surgery in 3 weeks.  I do not want him on written Skyler during or after the surgery.  He does have a high cardiovascular risk profile.  He and his wife agree to discontinue them both once he is approved for Omvoh.  We will plan for his first infusion to be done preop to his hip replacement and then his second infusion to be postop.  She does not like we can push out the hip replacement surgery.  Today they agree to pursue Omvoh.  MB

## 2024-05-01 ENCOUNTER — OFFICE VISIT (OUTPATIENT)
Dept: URBAN - METROPOLITAN AREA TELEHEALTH 2 | Facility: TELEHEALTH | Age: 74
End: 2024-05-01
Payer: MEDICARE

## 2024-05-01 ENCOUNTER — DASHBOARD ENCOUNTERS (OUTPATIENT)
Age: 74
End: 2024-05-01

## 2024-05-01 DIAGNOSIS — R74.8 ELEVATED LIVER ENZYMES: ICD-10-CM

## 2024-05-01 DIAGNOSIS — K83.01 PSC (PRIMARY SCLEROSING CHOLANGITIS): ICD-10-CM

## 2024-05-01 DIAGNOSIS — A04.72 CLOSTRIDIUM DIFFICILE DIARRHEA: ICD-10-CM

## 2024-05-01 DIAGNOSIS — K51.018 ULCERATIVE PANCOLITIS WITH OTHER COMPLICATION: ICD-10-CM

## 2024-05-01 PROCEDURE — 99443 PHONE E/M BY PHYS 21-30 MIN: CPT | Performed by: NURSE PRACTITIONER

## 2024-05-01 RX ORDER — FAMOTIDINE 40 MG/1
TAKE 1 TABLET BY MOUTH TWICE A DAY TABLET, FILM COATED ORAL TWICE A DAY
Qty: 180 TABLET | Refills: 3 | Status: ACTIVE | COMMUNITY

## 2024-05-01 RX ORDER — BUDESONIDE 3 MG/1
TAKE THREE CAPSULES BY MOUTH EVERY DAY CAPSULE, COATED PELLETS ORAL
Qty: 270 CAPSULE | Refills: 3 | Status: ACTIVE | COMMUNITY

## 2024-05-01 RX ORDER — COLESTIPOL HYDROCHLORIDE 1 G/1
TAKE 1 TABLET BY MOUTH TWICE A DAY TABLET, FILM COATED ORAL
Qty: 180 TABLET | Refills: 3 | Status: ACTIVE | COMMUNITY

## 2024-05-01 RX ORDER — PREDNISONE 1 MG/1
1 TABLET TABLET ORAL EVERY OTHER DAY
Qty: 90 TABLETS | Refills: 3 | Status: ACTIVE | COMMUNITY
Start: 2023-05-22 | End: 2025-03-01

## 2024-05-01 RX ORDER — PROPRANOLOL HYDROCHLORIDE 40 MG/1
AS DIRECTED TABLET ORAL
Status: ACTIVE | COMMUNITY

## 2024-05-01 RX ORDER — PREDNISONE 5 MG/1
1 TABLET TABLET ORAL ONCE A DAY
Qty: 30 | Refills: 3 | Status: ACTIVE | COMMUNITY
Start: 2024-04-03 | End: 2024-08-01

## 2024-05-01 RX ORDER — MIRIKIZUMAB-MRKZ 100 MG/ML
AS DIRECTED INJECTION, SOLUTION SUBCUTANEOUS
Qty: 2 UNSPECIFIED | Refills: 11 | Status: ACTIVE | COMMUNITY
Start: 2024-02-07 | End: 2025-02-05

## 2024-05-01 RX ORDER — MIRIKIZUMAB-MRKZ 20 MG/ML
300MG/15ML IV AT WEEK 0,4,8, THEN 200MGSUBCUTANEOUSEVERY 4 WEEKS, 4 WEEKS AFTER LAST INFUSION INJECTION, SOLUTION INTRAVENOUS
Qty: 1 UNSPECIFIED | Refills: 2 | Status: ACTIVE | COMMUNITY
Start: 2024-02-07 | End: 2024-06-04

## 2024-05-01 RX ORDER — METRONIDAZOLE 500 MG/1
1 TABLET TABLET ORAL THREE TIMES A DAY
Qty: 30 TABLETS | Refills: 0 | Status: ON HOLD | COMMUNITY

## 2024-05-01 RX ORDER — MESALAMINE 800 MG/1
TAKE 1 TABLET BY MOUTH 3 TIMES A DAY TABLET, DELAYED RELEASE ORAL
Qty: 90 TABLET | Refills: 3 | Status: ACTIVE | COMMUNITY

## 2024-05-08 ENCOUNTER — TELEPHONE ENCOUNTER (OUTPATIENT)
Dept: URBAN - NONMETROPOLITAN AREA CLINIC 2 | Facility: CLINIC | Age: 74
End: 2024-05-08

## 2024-07-02 ENCOUNTER — TELEPHONE ENCOUNTER (OUTPATIENT)
Dept: URBAN - NONMETROPOLITAN AREA CLINIC 2 | Facility: CLINIC | Age: 74
End: 2024-07-02

## 2024-08-01 ENCOUNTER — ERX REFILL RESPONSE (OUTPATIENT)
Dept: URBAN - NONMETROPOLITAN AREA CLINIC 2 | Facility: CLINIC | Age: 74
End: 2024-08-01

## 2024-08-01 RX ORDER — PREDNISONE 5 MG/1
TAKE 1 TABLET BY MOUTH EVERY DAY TABLET ORAL
Qty: 30 TABLET | Refills: 3 | OUTPATIENT

## 2024-08-01 RX ORDER — PREDNISONE 5 MG/1
1 TABLET TABLET ORAL ONCE A DAY
Qty: 30 | Refills: 3 | OUTPATIENT

## 2024-08-07 ENCOUNTER — OFFICE VISIT (OUTPATIENT)
Dept: URBAN - METROPOLITAN AREA TELEHEALTH 2 | Facility: TELEHEALTH | Age: 74
End: 2024-08-07
Payer: COMMERCIAL

## 2024-08-07 DIAGNOSIS — K83.01 PSC (PRIMARY SCLEROSING CHOLANGITIS): ICD-10-CM

## 2024-08-07 DIAGNOSIS — R76.12 (QFT) QUANTIFERON-TB TEST REACTION WITHOUT ACTIVE TUBERCULOSIS: ICD-10-CM

## 2024-08-07 DIAGNOSIS — K51.018 ULCERATIVE PANCOLITIS WITH OTHER COMPLICATION: ICD-10-CM

## 2024-08-07 DIAGNOSIS — R19.7 DIARRHEA, UNSPECIFIED TYPE: ICD-10-CM

## 2024-08-07 PROCEDURE — 99443 PHONE E/M BY PHYS 21-30 MIN: CPT | Performed by: NURSE PRACTITIONER

## 2024-08-07 PROCEDURE — 99214 OFFICE O/P EST MOD 30 MIN: CPT | Performed by: NURSE PRACTITIONER

## 2024-08-07 RX ORDER — PREDNISONE 1 MG/1
1 TABLET TABLET ORAL EVERY OTHER DAY
Qty: 90 TABLETS | Refills: 3 | Status: ACTIVE | COMMUNITY
Start: 2023-05-22 | End: 2025-03-01

## 2024-08-07 RX ORDER — PREDNISONE 5 MG/1
TAKE 1 TABLET BY MOUTH EVERY DAY TABLET ORAL
Qty: 30 TABLET | Refills: 3 | Status: ACTIVE | COMMUNITY

## 2024-08-07 RX ORDER — MIRIKIZUMAB-MRKZ 100 MG/ML
AS DIRECTED INJECTION, SOLUTION SUBCUTANEOUS
Qty: 2 UNSPECIFIED | Refills: 11 | Status: ACTIVE | COMMUNITY
Start: 2024-02-07 | End: 2025-02-05

## 2024-08-07 RX ORDER — PROPRANOLOL HYDROCHLORIDE 40 MG/1
AS DIRECTED TABLET ORAL
Status: ACTIVE | COMMUNITY

## 2024-08-07 RX ORDER — COLESTIPOL HYDROCHLORIDE 1 G/1
TAKE 1 TABLET BY MOUTH TWICE A DAY TABLET, FILM COATED ORAL
Qty: 180 TABLET | Refills: 3 | Status: ACTIVE | COMMUNITY

## 2024-08-07 RX ORDER — FAMOTIDINE 40 MG/1
TAKE 1 TABLET BY MOUTH TWICE A DAY TABLET, FILM COATED ORAL TWICE A DAY
Qty: 180 TABLET | Refills: 3 | Status: ACTIVE | COMMUNITY

## 2024-08-07 RX ORDER — MESALAMINE 800 MG/1
TAKE 1 TABLET BY MOUTH 3 TIMES A DAY TABLET, DELAYED RELEASE ORAL
Qty: 90 TABLET | Refills: 3 | Status: ACTIVE | COMMUNITY

## 2024-08-07 RX ORDER — METRONIDAZOLE 500 MG/1
1 TABLET TABLET ORAL THREE TIMES A DAY
Qty: 30 TABLETS | Refills: 0 | Status: ON HOLD | COMMUNITY

## 2024-08-07 RX ORDER — PREDNISONE 1 MG/1
4 TABLETS DAILY TABLET ORAL ONCE A DAY
Qty: 360 TABLET | Refills: 1 | OUTPATIENT
Start: 2024-08-07 | End: 2025-02-02

## 2024-08-07 RX ORDER — BUDESONIDE 3 MG/1
TAKE THREE CAPSULES BY MOUTH EVERY DAY CAPSULE, COATED PELLETS ORAL
Qty: 270 CAPSULE | Refills: 3 | Status: ACTIVE | COMMUNITY

## 2024-08-07 NOTE — HPI-TODAY'S VISIT:
Mr. Pavon is a 71-year-old male who is referred by Dr. Justin Hinson for consultation of ulcerative colitis, PSC, and diarrhea.  A copy of this note will be sent to the referring physician.  He states all of his issues began in January 2020.  He developed a viral upper respiratory disease which was likely Covid at the time but not diagnosed.  He has had a downhill drop of his health since.  Over the course of the following months he develops gastrointestinal distress.  He underwent cholecystectomy at [facility] in August 2020 with no relief.  He then had a syncopal episode later that fall and was found to be profoundly anemic.  He had an EGD and colonoscopy by Dr. Becerril and was diagnosed with ulcerative colitis in November 2020.  He was treated with steroids, mesalamine, and Humira.  He has been on and off of prednisone since.  He states so long as he is on prednisone he has 1-3 formed bowel movements daily, however anytime he comes off he goes up to 30 bowel movements daily.  His last flare started 6 to 8 weeks ago, he called Dr. Becerril's office and was placed on 20 mg of prednisone with a slow taper.  He is down to 10 mg daily and having 3-4 urgent bowel movements with some incontinence.  He does report mucus but no bleeding, he does occasionally have nocturnal diarrhea.  This spring he continued to have elevated liver enzymes, he underwent an evaluation for questionable IPMN versus PSC.  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  He was placed on azathioprine along with ursodiol.  Since this summer he continues to struggle with his GI complaints.  He is on Florastor daily.  He is taking mesalamine 2 capsules 3 times daily.  He is on Humira every other week.  He has seen no improvement in his symptoms since starting azathioprine this spring.  Unfortunately we have none of Dr. Becerril's records including his EGD, colonoscopy, pathology, liver biopsy, or imaging.  He is also undergoing neurologic work-up for tremor, however looking back this all seemed to start post viral, and has worsened with steroids.  His blood sugars have been labile along with his blood pressure.  Today he is not doing well and is here for a second opinion.  MB   1/11/2022 Bony presents for follow-up of ulcerative colitis, questionable history of primary sclerosing cholangitis, and C. difficile. Since his last visit he underwent colonoscopy in September by Dr. Willis with moderate pancolitis. He was transitioned off Humira and started on steroids and Inflectra. After receiving 2 of his induction doses he developed progressive diarrhea. He was tested and found to be C. difficile positive. He completed a course of vancomycin with recurrent symptoms and treated twice. He underwent his third infusion with a flare of severe diarrhea in the third week in December. He was admitted and found to be toxin negative but antigen and PCR positive. He underwent flexible sigmoidoscopy with no significant pseudomembranous colitis. His steroids were discontinued and he was placed on a vancomycin taper by Dr. Carrillo. Today his diarrhea is slowing down. Yesterday he states he had around 15 loose bowel movements. Today he has only had approximately 6. He is not taking Florastor. He has been treated with colestipol in the past but is not taking this recently. I was able to review his liver biopsy ordered by Dr. Becerril, it is unclear that he has PSC from his liver biopsy. He does agree to a second opinion with hepatology at [facility]. 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 He does agree to pursue Dificid plus or minus monoclonal antibody infusion if he has recurrence. He does agree to see infectious disease. We had a long discussion regarding dehydration. He does agree to fluids and repeat blood work this week. He understands that if he develops severe diarrhea abdominal cramping fever or distention that he is to proceed back to the emergency department. MB 1/25/2022 The patient presents today for follow-up of his ulcerative colitis and C. difficile with severe diarrhea.  Since our last visit, he has not been doing well.  He has been readmitted to the hospital secondary to diarrhea.  He was found to have severe C. difficile colitis.  He is having 10-20 bowel movements daily.  He denies any blood in his stool.  He denies any abdominal pain.  On flexible sigmoidoscopy, this was felt to be secondary to C. difficile.  He is tapering down his vancomycin, and he is taking colestipol twice a day.  We have discussed starting on cholestyramine 4 times a day and changing him to Dificid.  He does have a follow-up with infectious disease.  We are also going to check his labs today to make sure he is not dehydrated, and to make sure he does not need to be admitted for IV fluids.  At this point, we have discussed advancing his diet as tolerated.  Increasing his cholestyramine and adding Dificid.  Until the Dificid is improved, I do want him to take the vancomycin 4 times a day.  I have discussed this plan with the patient and his wife, and they are in agreement.  3/2/2022 Bony presents for follow up of UC and c. diff. SInce his last visit he did see ID. He never went on dificid and never had the monoclonal antibody as his repeat c. diff was negative, he completed the vanc taper and had the monoclonal ab infusion. He is doing better during the day, still loose urgent stools after meals. However at night he is having a BM every 45 minutes to an hour with tensmus. His last dose of inflectra 12/21 at 5mg/kg, that completed his induction. He is currently taking orally mesalamine 800mg TID. He is not taking the cholestyramine. His diarrhea at night is the worse. He doesn't feel like this c. diff diarrhea. Today he is still struggling with diarrhea at night and is due for infliximab. MB  This televisit was performed at the patient's home. 3/30/2022 Bony presents for follow-up of ulcerative colitis, C. difficile, reflux, diarrhea.  Since his last visit he continues to slowly improve.  He did follow-up with Dr. Landau with no indication of monoclonal antibody and repeat negative stool for C. difficile.  Currently he got his second maintenance dose of infliximab last week.  He is down to 4-5 loose bowel movements daily.  He is struggling with tenesmus but this is slowly improving.  He is almost done with his 8-week course of budesonide.  He is on cholestyramine twice daily, Florastor twice daily, and mesalamine 800 p.o. 3 times daily.  His reflux has been flaring since being off pantoprazole.  He does have some odynophagia.  I am concerned he may have a component of Candida.  He agrees to empiric treatment of Diflucan for 14 days.  His last EGD was in 2020 with reflux and gastritis by Dr. Becerril.  He is still awaiting his appointment with hepatology.  He is off azathioprine and Miles for now.  He is due for repeat blood work today.  I do want him to keep a stool study at home in case he has further symptoms of C. difficile as he lives an hour away.  We will see each other again after his next maintenance dose infusion and continue to monitor his symptoms closely.  MB   5/4/2022 Bony presents for follow up of UC, C. diff, reflux and diarrhea. He states last week he states he was feeling well. He was have a soft BM a few times a day. 6 days ago he developed acute urgent and watery diarrhea with a BM 20-30 times daily. He says he has incredible brain fog and incredible fatigue. He slept for 26 hours. He is up all night using the restroom. He is due for his next infliximab on 5/15. He denies any abdominal pain. He is very confused and is halucinating. Today he is very weak, he is down to 176lb. He is eating but not able to keep much down in regard to his diarrhea. MB 6/22/2022 Bony presents for follow-up of ulcerative colitis.  Since his last visit his repeat drug titers show elevated serum antibody and 0 serum drug level.  We increased his infliximab dose to 10 mg/kg and have scheduled this every 6 weeks.  He received a dose on May 25 and had also started a prednisone taper which she is finishing up this week down to 5 mg.  His flexible sigmoidoscopy in June shows moderate left-sided disease, his biopsies actually showed mildly active disease.  Today we have discussed his work-up.  He agrees to repeat his infliximab titers at trough to see if he has any further antibody development and to see if his serum drug level is appropriate at the higher dose.  If not he agrees to consider via Zaria Jules.  If so he agrees to give the infliximab a few more doses to see if things improve.  He can continues the mesalamine 3 times daily, the Florastor twice daily, and the cholestyramine twice daily.  MB 9/2/2022 Bony presents for follow-up of ulcerative colitis.  Since his last visit his repeat infliximab titers done June 29 at trough shows a low titer of antibody development and a low serum drug level even despite 10 mg/kg every 6-week dosing.  He did receive 2 doses at the high dose at the increased interval.  His repeat flexible sigmoidoscopy in June reveals left-sided moderate disease.  We made the decision to transition to Entyvio.  Since his last visit he has gotten 1 dose of Entyvio.  He did have significant arthralgias after the infusion however he also stopped his prednisone taper 2 days prior to starting the Entyvio.  He contacted me earlier this week with up to 10 watery bowel movements again daily.  We restarted a prednisone taper and his diarrhea is better today.  His arthralgias are also better.  His blood sugars remain labile however he has been managing this with insulin.  Today we have discussed his plan.  We will plan to proceed with induction of Entyvio.  I will see him after induction and check his titers.  If he does not have appropriate serum drug level would consider every 4 week dosing depending on his sequela.  MB 9/20/2022 The patient presents today for follow-up of his ulcerative colitis.  Since her last visit, we switched him from infliximab to Entyvio.  He has gotten 2 doses of Entyvio.  After both of his doses, he developed significant arthralgias.  Also, over the past 5 days, he has been off prednisone.  He is having up to 20 loose watery bowel movements daily.  He feels extremely weak and lethargic.  On his last colonoscopy, he was found to have active colitis.  At this point, we have discussed several options.  We have discussed proceeding with a Yuan inhibitor with Rinvoq.  We are going to try to get this approved through his insurance.  In the meantime, I am going to give him another prednisone taper.  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  They could be a component of coming off prednisone.  He also feels that he has long COVID, and he is wondering if this is contributing to his symptoms as well.  Fortunately, his labs are fairly normal other than his creatinine has been mildly elevated.  Today, we are going to switch him from Entyvio to Rinvoq and proceed with prednisone taper.  We have discussed the other option would possibly be Stelara. 11/16/2022 Bony presents follow up of UC. He has not gotten the Rinvoq because he has not uploaded his medicare card to their system. He and his wife will perform this today. Per haja's discussion with fadia kapoor he will be approved once they recieve this information. He is down to 5mg of prednisone and doing fairly well. He is having 1-3 urgent BMs most days. He is fatigued. His blood sugars are still labile. Today he agrees to pursue uploading the required documentation to obtain assistance and let me know when he receives therapy. MB  This telehealth visit was provided at the patient's home. 2/8/2023 Bony presents for follow-up of ulcerative colitis.  Since his last visit he started Cristela Skyler December 2.  He started at 45 mg daily which she took for 8 weeks.  On February 3 he decreased down to 15 mg daily for his maintenance dosing.  He subsequently developed increased urgent loose watery diarrhea.  He was having up to 15 bowel movements typically through the night.  His stool studies are negative for C. difficile but positive fecal calprotectin over 500.  His colonoscopy earlier this year shows pancolitis, his flex sig in June shows moderate persistent disease despite infliximab.  He was transition to Entyvio which we discontinued in December for invoke.  Today he continues to complain of loose urgent bowel movements.  Earlier this week his wife messaged me with lower extremity swelling, given the concern for DVT on" we sent him to the emergency room with no evidence of DVT.  He denies any abdominal pain but does report a low-grade fever.  He is following with [facility] on a trial for a long haul COVID.  He is on prednisone 5 mg every other day.  He feels that the Rinvoq did help his ulcerative colitis symptoms, he was on a long prednisone taper during this induction as well.  Today he is still having 10-15 loose bowel movements daily.  He is on mesalamine 800 twice daily.  He is taking Imodium as well.  His symptoms are predominantly at night.  He does agree to repeat colonoscopy, and will consider Stelara induction plus or minus written RINVOQ dosing to 30 mg daily depending on his results.  MB  3/15/2023 Bony presents for [facility]. Since his last visit he started colestipol 1 gm twice daily and budesonide 9mg daily. He has also been going on [facility] Tuesdays and Fridays to get fluids. He states in the past 8 days he is feeling great. He is currently on 15mg of Rinvoq. He is down to 3 BMs daily with no bleeding or mucous. He is occasionally having loose stools which he relates to eating a high sugar meal. He is on prednisone 5mg every other day. He would like to try and wean off prednisone. He feels like the Rinvoq is really helping. He wants to continue Rinvoq for now. He wants to hold off on surgery referral. He agrees to hold off on fluids unless he is having 6-8 watery stools for 3 days in a row. Today he is doing better overall. MB  This telehealth visit was provided at the patient's home. 6/21/2023 Bony presents for follow-up of ulcerative colitis and possible history of PSC.  Today he is doing better than he has in years.  He is down to a soft bowel movement once or twice daily.  He is on mesalamine 800 mg 3 times daily, colestipol twice daily, budesonide 9 mg daily, and prednisone 2 mg every other day weaning down.  His blood sugars have stabilized.  He is struggling with severe fatigue which we have discussed is likely secondary to his prednisone weaning.  Today we have discussed weaning off of his prednisone and then weaning off the budesonide.  His colonoscopy in February shows moderate to severe pancolitis.  He was originally due for surveillance in September of this year, he would like to pursue repeat colonoscopy given significant improvement in his symptoms to evaluate the reflux effect.  He is due for repeat labs, he has a questionable history of PSC although his imaging and biopsy in the past have not suggested this.  He said he did see Dr. La.  We will repeat his labs along with MRI imaging as his alkaline phosphatase has bumped up a hair.  Today he is doing well otherwise with no new GI complaints.  MB This telehealth visit was provided at the patient's home. 1/31/2024 Bony presents for follow-up of ulcerative colitis, history of indeterminate QuantiFERON, history of C. difficile, and questionable history of PSC.  Since his last visit he has been lost to follow-up.  In July he developed acute heart failure and subsequent DVT.  He was placed on Xarelto.  Unfortunately he was discharged after this hospitalization and fell and broke his hip.  He is going through orthopedic surgery for his hip and continues to struggle with chronic pain.  He continues to struggle with weakness and shortness of breath and is recently been diagnosed with aortic valve stenosis and is being worked up by cardiology and pulmonology.  He has limited mobility.  He is on Rinvoq 15 mg daily, mesalamine 800 mg 3 times daily, budesonide 9 mg daily, and 1 mg of prednisone every other day.  When he stops the budesonide his diarrhea returns.  When he is on this regimen he has 1-2 soft bowel movements daily.  He did not have his repeat surveillance colonoscopy as previously ordered due to his multiple health conditions.  He feels that his colitis is actually under control and is hesitant to discontinue", we have discussed the significant risk of cardiovascular death related to Yuan inhibitors particularly in patients with cardiovascular risk factors.  He has been evaluated by palliative care and is undergoing intake today.  Today overall he feels fairly well on his current regimen.  I will review with Dr. Lilly and we will determine if we need to transition him off of Yuan inhibitors given his part cardiovascular risk factors.  He is not a candidate for sedation at this point given his aortic stenosis, shortness of breath, and worsening cardiovascular and pulmonary condition.  Will discuss at his follow-up depending on his clinical course.  MB 3/6/2024 Bony presents for follow-up of ulcerative colitis.  Since his last visit we had a long discussion regarding his cardiovascular risks.  He is scheduled for hip surgery in 3 weeks.  I do not want him on written Skyler during or after the surgery.  He does have a high cardiovascular risk profile.  He and his wife agree to discontinue them both once he is approved for Omvoh.  We will plan for his first infusion to be done preop to his hip replacement and then his second infusion to be postop.  She does not like we can push out the hip replacement surgery.  Today they agree to pursue Omvoh.  MB 5/1/2024 Bony presents for follow-up of ulcerative colitis.  Since his last visit he discontinued her invoke and started on though 3 weeks ago status post his first induction infusion.  He is doing well postoperatively.  He is back up to prednisone 5 mg daily, he is on budesonide 9 mg daily, colestipol twice daily, and 4 mesalamine daily.  He is having 1-2 soft bowel movements daily.  He would like to start weaning off some of his medications.  We will wait until he gets through induction dosing before we try and wean his prednisone, in the meantime he can try and wean off the mesalamine first.  He had 2 indeterminate TB test last year, he has still not had his PPD, he has had multiple chest chest x-rays.  He did not follow-up with infectious disease as ordered.  He does understand the risk of tuberculosis infection on biologic therapy.  Today we will repeat his QuantiFERON and PPD.  We will follow-up pending his results.  He is actually doing well and in much less pain.  MB 8/7/2024 Bony presents for follow-up of ulcerative colitis.  Since his last visit he has been doing well on Omvoh monthly.  He has actually been constipated.  He is still on colestipol twice daily and 1 mesalamine along with 5 mg of prednisone.  He has discontinued budesonide.  Today he agrees to stop the mesalamine, we will drop his colestipol to 1 daily and then try and wean off.  He would like to try and wean down on his prednisone, we have discussed a very long taper weaning by 1 mg monthly.  He is due for his colonoscopy next year.  He is on famotidine twice daily.  He has had no further flares of diarrhea.  He is still not had his repeat QuantiFERON and PPD.  We have discussed the importance and the risk of TB on biologic therapy.  He agrees to have this done tomorrow.  Today he is doing well with no new GI complaints.  MB

## 2024-08-29 ENCOUNTER — TELEPHONE ENCOUNTER (OUTPATIENT)
Dept: URBAN - NONMETROPOLITAN AREA CLINIC 2 | Facility: CLINIC | Age: 74
End: 2024-08-29

## 2024-09-03 ENCOUNTER — TELEPHONE ENCOUNTER (OUTPATIENT)
Dept: URBAN - NONMETROPOLITAN AREA CLINIC 2 | Facility: CLINIC | Age: 74
End: 2024-09-03

## 2024-09-03 ENCOUNTER — LAB OUTSIDE AN ENCOUNTER (OUTPATIENT)
Dept: URBAN - NONMETROPOLITAN AREA CLINIC 2 | Facility: CLINIC | Age: 74
End: 2024-09-03

## 2024-09-13 ENCOUNTER — LAB OUTSIDE AN ENCOUNTER (OUTPATIENT)
Dept: URBAN - NONMETROPOLITAN AREA CLINIC 2 | Facility: CLINIC | Age: 74
End: 2024-09-13

## 2024-09-19 ENCOUNTER — TELEPHONE ENCOUNTER (OUTPATIENT)
Dept: URBAN - NONMETROPOLITAN AREA CLINIC 2 | Facility: CLINIC | Age: 74
End: 2024-09-19

## 2024-09-20 LAB
A/G RATIO: 1.2
ABSOLUTE BASOPHILS: 28
ABSOLUTE EOSINOPHILS: 84
ABSOLUTE LYMPHOCYTES: 1200
ABSOLUTE MONOCYTES: 921
ABSOLUTE NEUTROPHILS: 7068
ACTIN (SMOOTH MUSCLE) ANTIBODY (IGG): <20
ALBUMIN: 3.5
ALKALINE PHOSPHATASE: 232
ALPHA-1-ANTITRYPSIN (AAT) PHENOTYPE: (no result)
ALT (SGPT): 62
ANA SCREEN, IFA: POSITIVE
AST (SGOT): 43
BASOPHILS: 0.3
BILIRUBIN, TOTAL: 0.5
BUN/CREATININE RATIO: (no result)
BUN: 16
C-REACTIVE PROTEIN, QUANT: 11.8
CALCIUM: 9
CARBON DIOXIDE, TOTAL: 27
CBC MORPHOLOGY: (no result)
CERULOPLASMIN: 28
CHLORIDE: 103
CHOL/HDLC RATIO: 2.7
CHOLESTEROL, TOTAL: 132
CREATININE: 1.21
EGFR: 63
EOSINOPHILS: 0.9
FERRITIN, SERUM: 109
GGT: 293
GLOBULIN, TOTAL: 2.9
GLUCOSE: 218
HDL CHOLESTEROL: 49
HEMATOCRIT: 43.4
HEMOGLOBIN: 13.3
INR: 1.3
LDL CHOLESTEROL CALC: 60
LYMPHOCYTES: 12.9
MCH: 26.4
MCHC: 30.6
MCV: 86.3
MITOCHONDRIAL AB SCREEN: NEGATIVE
MONOCYTES: 9.9
MPV: 11.8
NEUTROPHILS: 76
NON HDL CHOLESTEROL: 83
PLATELET COUNT: 293
POTASSIUM: 3.3
PROTEIN, TOTAL: 6.4
PT: 13.6
RDW: 23
RED BLOOD CELL COUNT: 5.03
SED RATE BY MODIFIED: 51
SODIUM: 141
T4, FREE: 0.9
TRIGLYCERIDES: 157
TSH: 3.69
WHITE BLOOD CELL COUNT: 9.3

## 2024-10-02 ENCOUNTER — TELEPHONE ENCOUNTER (OUTPATIENT)
Dept: URBAN - NONMETROPOLITAN AREA CLINIC 2 | Facility: CLINIC | Age: 74
End: 2024-10-02

## 2024-10-07 ENCOUNTER — TELEPHONE ENCOUNTER (OUTPATIENT)
Dept: URBAN - NONMETROPOLITAN AREA CLINIC 2 | Facility: CLINIC | Age: 74
End: 2024-10-07

## 2024-10-07 ENCOUNTER — WEB ENCOUNTER (OUTPATIENT)
Dept: URBAN - NONMETROPOLITAN AREA CLINIC 2 | Facility: CLINIC | Age: 74
End: 2024-10-07

## 2024-10-08 ENCOUNTER — WEB ENCOUNTER (OUTPATIENT)
Dept: URBAN - NONMETROPOLITAN AREA CLINIC 2 | Facility: CLINIC | Age: 74
End: 2024-10-08

## 2024-10-09 ENCOUNTER — WEB ENCOUNTER (OUTPATIENT)
Dept: URBAN - NONMETROPOLITAN AREA CLINIC 2 | Facility: CLINIC | Age: 74
End: 2024-10-09

## 2024-10-11 ENCOUNTER — WEB ENCOUNTER (OUTPATIENT)
Dept: URBAN - NONMETROPOLITAN AREA CLINIC 2 | Facility: CLINIC | Age: 74
End: 2024-10-11

## 2024-10-14 ENCOUNTER — WEB ENCOUNTER (OUTPATIENT)
Dept: URBAN - NONMETROPOLITAN AREA CLINIC 2 | Facility: CLINIC | Age: 74
End: 2024-10-14

## 2024-10-17 ENCOUNTER — OFFICE VISIT (OUTPATIENT)
Dept: URBAN - NONMETROPOLITAN AREA CLINIC 2 | Facility: CLINIC | Age: 74
End: 2024-10-17
Payer: COMMERCIAL

## 2024-10-17 VITALS
WEIGHT: 170 LBS | SYSTOLIC BLOOD PRESSURE: 130 MMHG | DIASTOLIC BLOOD PRESSURE: 86 MMHG | HEIGHT: 72 IN | BODY MASS INDEX: 23.03 KG/M2 | HEART RATE: 102 BPM

## 2024-10-17 DIAGNOSIS — K86.2 PANCREATIC CYST: ICD-10-CM

## 2024-10-17 DIAGNOSIS — Z12.11 COLON CANCER SCREENING: ICD-10-CM

## 2024-10-17 DIAGNOSIS — R19.7 DIARRHEA, UNSPECIFIED TYPE: ICD-10-CM

## 2024-10-17 DIAGNOSIS — K51.018 ULCERATIVE PANCOLITIS WITH OTHER COMPLICATION: ICD-10-CM

## 2024-10-17 DIAGNOSIS — I82.409 DEEP VEIN THROMBOSIS (DVT) OF LOWER EXTREMITY, UNSPECIFIED CHRONICITY, UNSPECIFIED LATERALITY, UNSPECIFIED VEIN: ICD-10-CM

## 2024-10-17 DIAGNOSIS — R76.12 (QFT) QUANTIFERON-TB TEST REACTION WITHOUT ACTIVE TUBERCULOSIS: ICD-10-CM

## 2024-10-17 DIAGNOSIS — R74.8 ELEVATED LIVER ENZYMES: ICD-10-CM

## 2024-10-17 DIAGNOSIS — K83.01 PSC (PRIMARY SCLEROSING CHOLANGITIS): ICD-10-CM

## 2024-10-17 DIAGNOSIS — A04.72 CLOSTRIDIUM DIFFICILE DIARRHEA: ICD-10-CM

## 2024-10-17 DIAGNOSIS — K21.9 GASTROESOPHAGEAL REFLUX DISEASE, UNSPECIFIED WHETHER ESOPHAGITIS PRESENT: ICD-10-CM

## 2024-10-17 PROBLEM — 31258000: Status: ACTIVE | Noted: 2024-10-09

## 2024-10-17 PROCEDURE — 99214 OFFICE O/P EST MOD 30 MIN: CPT | Performed by: NURSE PRACTITIONER

## 2024-10-17 RX ORDER — MESALAMINE 800 MG/1
TAKE 1 TABLET BY MOUTH 3 TIMES A DAY TABLET, DELAYED RELEASE ORAL
Qty: 90 TABLET | Refills: 3 | Status: ACTIVE | COMMUNITY

## 2024-10-17 RX ORDER — COLESTIPOL HYDROCHLORIDE 1 G/1
TAKE 1 TABLET BY MOUTH TWICE A DAY TABLET, FILM COATED ORAL
Qty: 180 TABLET | Refills: 3 | Status: ACTIVE | COMMUNITY

## 2024-10-17 RX ORDER — FAMOTIDINE 40 MG/1
TAKE 1 TABLET BY MOUTH TWICE A DAY TABLET, FILM COATED ORAL TWICE A DAY
Qty: 180 TABLET | Refills: 3 | Status: ACTIVE | COMMUNITY

## 2024-10-17 RX ORDER — COLESTIPOL HYDROCHLORIDE 1 G/1
1 TABLET TABLET, FILM COATED ORAL ONCE A DAY
Qty: 90 TABLET | Refills: 3 | OUTPATIENT
Start: 2024-10-17

## 2024-10-17 RX ORDER — PREDNISONE 1 MG/1
4 TABLETS DAILY TABLET ORAL ONCE A DAY
Qty: 360 TABLET | Refills: 1 | Status: ACTIVE | COMMUNITY
Start: 2024-08-07 | End: 2025-02-02

## 2024-10-17 RX ORDER — MIRIKIZUMAB-MRKZ 100 MG/ML
AS DIRECTED INJECTION, SOLUTION SUBCUTANEOUS
Qty: 2 UNSPECIFIED | Refills: 11 | Status: ACTIVE | COMMUNITY
Start: 2024-02-07 | End: 2025-02-05

## 2024-10-17 RX ORDER — BUDESONIDE 3 MG/1
TAKE THREE CAPSULES BY MOUTH EVERY DAY CAPSULE, COATED PELLETS ORAL
Qty: 270 CAPSULE | Refills: 3 | Status: ACTIVE | COMMUNITY

## 2024-10-17 RX ORDER — METRONIDAZOLE 500 MG/1
1 TABLET TABLET ORAL THREE TIMES A DAY
Qty: 30 TABLETS | Refills: 0 | Status: ON HOLD | COMMUNITY

## 2024-10-17 RX ORDER — PREDNISONE 5 MG/1
TAKE 1 TABLET BY MOUTH EVERY DAY TABLET ORAL
Qty: 30 TABLET | Refills: 3 | Status: ACTIVE | COMMUNITY

## 2024-10-17 RX ORDER — PREDNISONE 5 MG/1
1 TABLET TABLET ORAL ONCE A DAY
Qty: 30 | Refills: 11 | OUTPATIENT
Start: 2024-10-17 | End: 2025-10-12

## 2024-10-17 RX ORDER — PREDNISONE 1 MG/1
1 TABLET TABLET ORAL EVERY OTHER DAY
Qty: 90 TABLETS | Refills: 3 | Status: ACTIVE | COMMUNITY
Start: 2023-05-22 | End: 2025-03-01

## 2024-10-17 RX ORDER — PROPRANOLOL HYDROCHLORIDE 40 MG/1
AS DIRECTED TABLET ORAL
Status: ACTIVE | COMMUNITY

## 2024-10-17 NOTE — HPI-TODAY'S VISIT:
Mr. Pavon is a 71-year-old male who is referred by Dr. Justin Hinson for consultation of ulcerative colitis, PSC, and diarrhea.  A copy of this note will be sent to the referring physician.  He states all of his issues began in January 2020.  He developed a viral upper respiratory disease which was likely Covid at the time but not diagnosed.  He has had a downhill drop of his health since.  Over the course of the following months he develops gastrointestinal distress.  He underwent cholecystectomy at [facility] in August 2020 with no relief.  He then had a syncopal episode later that fall and was found to be profoundly anemic.  He had an EGD and colonoscopy by Dr. Becerril and was diagnosed with ulcerative colitis in November 2020.  He was treated with steroids, mesalamine, and Humira.  He has been on and off of prednisone since.  He states so long as he is on prednisone he has 1-3 formed bowel movements daily, however anytime he comes off he goes up to 30 bowel movements daily.  His last flare started 6 to 8 weeks ago, he called Dr. Becerril's office and was placed on 20 mg of prednisone with a slow taper.  He is down to 10 mg daily and having 3-4 urgent bowel movements with some incontinence.  He does report mucus but no bleeding, he does occasionally have nocturnal diarrhea.  This spring he continued to have elevated liver enzymes, he underwent an evaluation for questionable IPMN versus PSC.  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  He was placed on azathioprine along with ursodiol.  Since this summer he continues to struggle with his GI complaints.  He is on Florastor daily.  He is taking mesalamine 2 capsules 3 times daily.  He is on Humira every other week.  He has seen no improvement in his symptoms since starting azathioprine this spring.  Unfortunately we have none of Dr. Becerril's records including his EGD, colonoscopy, pathology, liver biopsy, or imaging.  He is also undergoing neurologic work-up for tremor, however looking back this all seemed to start post viral, and has worsened with steroids.  His blood sugars have been labile along with his blood pressure.  Today he is not doing well and is here for a second opinion.  MB   1/11/2022 Bony presents for follow-up of ulcerative colitis, questionable history of primary sclerosing cholangitis, and C. difficile. Since his last visit he underwent colonoscopy in September by Dr. Willis with moderate pancolitis. He was transitioned off Humira and started on steroids and Inflectra. After receiving 2 of his induction doses he developed progressive diarrhea. He was tested and found to be C. difficile positive. He completed a course of vancomycin with recurrent symptoms and treated twice. He underwent his third infusion with a flare of severe diarrhea in the third week in December. He was admitted and found to be toxin negative but antigen and PCR positive. He underwent flexible sigmoidoscopy with no significant pseudomembranous colitis. His steroids were discontinued and he was placed on a vancomycin taper by Dr. Carrillo. Today his diarrhea is slowing down. Yesterday he states he had around 15 loose bowel movements. Today he has only had approximately 6. He is not taking Florastor. He has been treated with colestipol in the past but is not taking this recently. I was able to review his liver biopsy ordered by Dr. Becerril, it is unclear that he has PSC from his liver biopsy. He does agree to a second opinion with hepatology at [facility]. 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 He does agree to pursue Dificid plus or minus monoclonal antibody infusion if he has recurrence. He does agree to see infectious disease. We had a long discussion regarding dehydration. He does agree to fluids and repeat blood work this week. He understands that if he develops severe diarrhea abdominal cramping fever or distention that he is to proceed back to the emergency department. MB 1/25/2022 The patient presents today for follow-up of his ulcerative colitis and C. difficile with severe diarrhea.  Since our last visit, he has not been doing well.  He has been readmitted to the hospital secondary to diarrhea.  He was found to have severe C. difficile colitis.  He is having 10-20 bowel movements daily.  He denies any blood in his stool.  He denies any abdominal pain.  On flexible sigmoidoscopy, this was felt to be secondary to C. difficile.  He is tapering down his vancomycin, and he is taking colestipol twice a day.  We have discussed starting on cholestyramine 4 times a day and changing him to Dificid.  He does have a follow-up with infectious disease.  We are also going to check his labs today to make sure he is not dehydrated, and to make sure he does not need to be admitted for IV fluids.  At this point, we have discussed advancing his diet as tolerated.  Increasing his cholestyramine and adding Dificid.  Until the Dificid is improved, I do want him to take the vancomycin 4 times a day.  I have discussed this plan with the patient and his wife, and they are in agreement.  3/2/2022 Bony presents for follow up of UC and c. diff. SInce his last visit he did see ID. He never went on dificid and never had the monoclonal antibody as his repeat c. diff was negative, he completed the vanc taper and had the monoclonal ab infusion. He is doing better during the day, still loose urgent stools after meals. However at night he is having a BM every 45 minutes to an hour with tensmus. His last dose of inflectra 12/21 at 5mg/kg, that completed his induction. He is currently taking orally mesalamine 800mg TID. He is not taking the cholestyramine. His diarrhea at night is the worse. He doesn't feel like this c. diff diarrhea. Today he is still struggling with diarrhea at night and is due for infliximab. MB  This televisit was performed at the patient's home. 3/30/2022 Bony presents for follow-up of ulcerative colitis, C. difficile, reflux, diarrhea.  Since his last visit he continues to slowly improve.  He did follow-up with Dr. Landau with no indication of monoclonal antibody and repeat negative stool for C. difficile.  Currently he got his second maintenance dose of infliximab last week.  He is down to 4-5 loose bowel movements daily.  He is struggling with tenesmus but this is slowly improving.  He is almost done with his 8-week course of budesonide.  He is on cholestyramine twice daily, Florastor twice daily, and mesalamine 800 p.o. 3 times daily.  His reflux has been flaring since being off pantoprazole.  He does have some odynophagia.  I am concerned he may have a component of Candida.  He agrees to empiric treatment of Diflucan for 14 days.  His last EGD was in 2020 with reflux and gastritis by Dr. Becerril.  He is still awaiting his appointment with hepatology.  He is off azathioprine and Miles for now.  He is due for repeat blood work today.  I do want him to keep a stool study at home in case he has further symptoms of C. difficile as he lives an hour away.  We will see each other again after his next maintenance dose infusion and continue to monitor his symptoms closely.  MB   5/4/2022 Bony presents for follow up of UC, C. diff, reflux and diarrhea. He states last week he states he was feeling well. He was have a soft BM a few times a day. 6 days ago he developed acute urgent and watery diarrhea with a BM 20-30 times daily. He says he has incredible brain fog and incredible fatigue. He slept for 26 hours. He is up all night using the restroom. He is due for his next infliximab on 5/15. He denies any abdominal pain. He is very confused and is halucinating. Today he is very weak, he is down to 176lb. He is eating but not able to keep much down in regard to his diarrhea. MB 6/22/2022 Bony presents for follow-up of ulcerative colitis.  Since his last visit his repeat drug titers show elevated serum antibody and 0 serum drug level.  We increased his infliximab dose to 10 mg/kg and have scheduled this every 6 weeks.  He received a dose on May 25 and had also started a prednisone taper which she is finishing up this week down to 5 mg.  His flexible sigmoidoscopy in June shows moderate left-sided disease, his biopsies actually showed mildly active disease.  Today we have discussed his work-up.  He agrees to repeat his infliximab titers at trough to see if he has any further antibody development and to see if his serum drug level is appropriate at the higher dose.  If not he agrees to consider via [facility] Jules.  If so he agrees to give the infliximab a few more doses to see if things improve.  He can continues the mesalamine 3 times daily, the Florastor twice daily, and the cholestyramine twice daily.  MB 9/2/2022 Bony presents for follow-up of ulcerative colitis.  Since his last visit his repeat infliximab titers done June 29 at trough shows a low titer of antibody development and a low serum drug level even despite 10 mg/kg every 6-week dosing.  He did receive 2 doses at the high dose at the increased interval.  His repeat flexible sigmoidoscopy in June reveals left-sided moderate disease.  We made the decision to transition to Entyvio.  Since his last visit he has gotten 1 dose of Entyvio.  He did have significant arthralgias after the infusion however he also stopped his prednisone taper 2 days prior to starting the Entyvio.  He contacted me earlier this week with up to 10 watery bowel movements again daily.  We restarted a prednisone taper and his diarrhea is better today.  His arthralgias are also better.  His blood sugars remain labile however he has been managing this with insulin.  Today we have discussed his plan.  We will plan to proceed with induction of Entyvio.  I will see him after induction and check his titers.  If he does not have appropriate serum drug level would consider every 4 week dosing depending on his sequela.  MB 9/20/2022 The patient presents today for follow-up of his ulcerative colitis.  Since her last visit, we switched him from infliximab to Entyvio.  He has gotten 2 doses of Entyvio.  After both of his doses, he developed significant arthralgias.  Also, over the past 5 days, he has been off prednisone.  He is having up to 20 loose watery bowel movements daily.  He feels extremely weak and lethargic.  On his last colonoscopy, he was found to have active colitis.  At this point, we have discussed several options.  We have discussed proceeding with a Yuan inhibitor with Rinvoq.  We are going to try to get this approved through his insurance.  In the meantime, I am going to give him another prednisone taper.  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  They could be a component of coming off prednisone.  He also feels that he has long COVID, and he is wondering if this is contributing to his symptoms as well.  Fortunately, his labs are fairly normal other than his creatinine has been mildly elevated.  Today, we are going to switch him from Entyvio to Rinvoq and proceed with prednisone taper.  We have discussed the other option would possibly be Stelara. 11/16/2022 Bony presents follow up of UC. He has not gotten the Rinvoq because he has not uploaded his medicare card to their system. He and his wife will perform this today. Per haja's discussion with fadia kapoor he will be approved once they recieve this information. He is down to 5mg of prednisone and doing fairly well. He is having 1-3 urgent BMs most days. He is fatigued. His blood sugars are still labile. Today he agrees to pursue uploading the required documentation to obtain assistance and let me know when he receives therapy. MB  This telehealth visit was provided at the patient's home. 2/8/2023 Bony presents for follow-up of ulcerative colitis.  Since his last visit he started Cristela Skyler December 2.  He started at 45 mg daily which she took for 8 weeks.  On February 3 he decreased down to 15 mg daily for his maintenance dosing.  He subsequently developed increased urgent loose watery diarrhea.  He was having up to 15 bowel movements typically through the night.  His stool studies are negative for C. difficile but positive fecal calprotectin over 500.  His colonoscopy earlier this year shows pancolitis, his flex sig in June shows moderate persistent disease despite infliximab.  He was transition to Entyvio which we discontinued in December for invoke.  Today he continues to complain of loose urgent bowel movements.  Earlier this week his wife messaged me with lower extremity swelling, given the concern for DVT on" we sent him to the emergency room with no evidence of DVT.  He denies any abdominal pain but does report a low-grade fever.  He is following with [facility] on a trial for a long haul COVID.  He is on prednisone 5 mg every other day.  He feels that the Rinvoq did help his ulcerative colitis symptoms, he was on a long prednisone taper during this induction as well.  Today he is still having 10-15 loose bowel movements daily.  He is on mesalamine 800 twice daily.  He is taking Imodium as well.  His symptoms are predominantly at night.  He does agree to repeat colonoscopy, and will consider Stelara induction plus or minus written RINVOQ dosing to 30 mg daily depending on his results.  MB  3/15/2023 Bony presents for [place] of UC. Since his last visit he started colestipol 1 gm twice daily and budesonide 9mg daily. He has also been going on [facility] Tuesdays and Fridays to get fluids. He states in the past 8 days he is feeling great. He is currently on 15mg of Rinvoq. He is down to 3 BMs daily with no bleeding or mucous. He is occasionally having loose stools which he relates to eating a high sugar meal. He is on prednisone 5mg every other day. He would like to try and wean off prednisone. He feels like the Rinvoq is really helping. He wants to continue Rinvoq for now. He wants to hold off on surgery referral. He agrees to hold off on fluids unless he is having 6-8 watery stools for 3 days in a row. Today he is doing better overall. MB  This telehealth visit was provided at the patient's home. 6/21/2023 Bony presents for follow-up of ulcerative colitis and possible history of PSC.  Today he is doing better than he has in years.  He is down to a soft bowel movement once or twice daily.  He is on mesalamine 800 mg 3 times daily, colestipol twice daily, budesonide 9 mg daily, and prednisone 2 mg every other day weaning down.  His blood sugars have stabilized.  He is struggling with severe fatigue which we have discussed is likely secondary to his prednisone weaning.  Today we have discussed weaning off of his prednisone and then weaning off the budesonide.  His colonoscopy in February shows moderate to severe pancolitis.  He was originally due for surveillance in September of this year, he would like to pursue repeat colonoscopy given significant improvement in his symptoms to evaluate the reflux effect.  He is due for repeat labs, he has a questionable history of PSC although his imaging and biopsy in the past have not suggested this.  He said he did see Dr. La.  We will repeat his labs along with MRI imaging as his alkaline phosphatase has bumped up a hair.  Today he is doing well otherwise with no new GI complaints.  MB This telehealth visit was provided at the patient's home. 1/31/2024 Bony presents for follow-up of ulcerative colitis, history of indeterminate QuantiFERON, history of C. difficile, and questionable history of PSC.  Since his last visit he has been lost to follow-up.  In July he developed acute heart failure and subsequent DVT.  He was placed on Xarelto.  Unfortunately he was discharged after this hospitalization and fell and broke his hip.  He is going through orthopedic surgery for his hip and continues to struggle with chronic pain.  He continues to struggle with weakness and shortness of breath and is recently been diagnosed with aortic valve stenosis and is being worked up by cardiology and pulmonology.  He has limited mobility.  He is on Rinvoq 15 mg daily, mesalamine 800 mg 3 times daily, budesonide 9 mg daily, and 1 mg of prednisone every other day.  When he stops the budesonide his diarrhea returns.  When he is on this regimen he has 1-2 soft bowel movements daily.  He did not have his repeat surveillance colonoscopy as previously ordered due to his multiple health conditions.  He feels that his colitis is actually under control and is hesitant to discontinue", we have discussed the significant risk of cardiovascular death related to Yuan inhibitors particularly in patients with cardiovascular risk factors.  He has been evaluated by palliative care and is undergoing intake today.  Today overall he feels fairly well on his current regimen.  I will review with Dr. Lilly and we will determine if we need to transition him off of Yuan inhibitors given his part cardiovascular risk factors.  He is not a candidate for sedation at this point given his aortic stenosis, shortness of breath, and worsening cardiovascular and pulmonary condition.  Will discuss at his follow-up depending on his clinical course.  MB 3/6/2024 Bony presents for follow-up of ulcerative colitis.  Since his last visit we had a long discussion regarding his cardiovascular risks.  He is scheduled for hip surgery in 3 weeks.  I do not want him on written Skyler during or after the surgery.  He does have a high cardiovascular risk profile.  He and his wife agree to discontinue them both once he is approved for Omvoh.  We will plan for his first infusion to be done preop to his hip replacement and then his second infusion to be postop.  She does not like we can push out the hip replacement surgery.  Today they agree to pursue Omvoh.  MB 5/1/2024 Bony presents for follow-up of ulcerative colitis.  Since his last visit he discontinued her invoke and started on though 3 weeks ago status post his first induction infusion.  He is doing well postoperatively.  He is back up to prednisone 5 mg daily, he is on budesonide 9 mg daily, colestipol twice daily, and 4 mesalamine daily.  He is having 1-2 soft bowel movements daily.  He would like to start weaning off some of his medications.  We will wait until he gets through induction dosing before we try and wean his prednisone, in the meantime he can try and wean off the mesalamine first.  He had 2 indeterminate TB test last year, he has still not had his PPD, he has had multiple chest chest x-rays.  He did not follow-up with infectious disease as ordered.  He does understand the risk of tuberculosis infection on biologic therapy.  Today we will repeat his QuantiFERON and PPD.  We will follow-up pending his results.  He is actually doing well and in much less pain.  MB 8/7/2024 Bony presents for follow-up of ulcerative colitis.  Since his last visit he has been doing well on Omvoh monthly.  He has actually been constipated.  He is still on colestipol twice daily and 1 mesalamine along with 5 mg of prednisone.  He has discontinued budesonide.  Today he agrees to stop the mesalamine, we will drop his colestipol to 1 daily and then try and wean off.  He would like to try and wean down on his prednisone, we have discussed a very long taper weaning by 1 mg monthly.  He is due for his colonoscopy next year.  He is on famotidine twice daily.  He has had no further flares of diarrhea.  He is still not had his repeat QuantiFERON and PPD.  We have discussed the importance and the risk of TB on biologic therapy.  He agrees to have this done tomorrow.  Today he is doing well with no new GI complaints.  MB 10/17/2024 Bony presents for follow-up of ulcerative colitis, elevated liver enzymes, questionable history of PSC, weight loss, and failure to thrive.  Since his last visit his symptoms overall have taken a turn.  Mid-September he developed a decline in his health with severe fatigue.  His liver enzymes did increase, his repeat numbers show an secondary increase with positive CHARLI, his IgG is normal.  He has been compliant with his Omvoh every 4 weeks.  We pulled him off his budesonide, off the colestipol, off the mesalamine, and dropped his prednisone from 5 mg to 4 mg daily.  Since then he has had severe fatigue dizziness and a profound weight loss of over 10 pounds.  His CT scan showed slightly enlarged pancreatic cyst and fatty deposition of the liver but no acute process.  He has had some increased loose stools.  He denies any rectal bleeding tenesmus or mucus.  Today he agrees to increase his prednisone back to 5 mg daily, will restart at 1 colestipol daily.  He does not want to hold his umbo as discussed, he agrees to take his dose in late October and repeat labs 1 week later.  He does have an indeterminate TB test and will get his PPD from home health care with [facility].  His liver enzymes have all normalized except for his alkaline phosphatase on labs done this Sunday.  He is working with physical therapy.  I am concerned his deconditioning may be related to his prednisone dose decrease versus a viral etiology with a spike in his liver enzymes.  We will follow-up pending his clinical course.  MB

## 2024-10-28 ENCOUNTER — WEB ENCOUNTER (OUTPATIENT)
Dept: URBAN - NONMETROPOLITAN AREA CLINIC 2 | Facility: CLINIC | Age: 74
End: 2024-10-28

## 2024-10-30 ENCOUNTER — WEB ENCOUNTER (OUTPATIENT)
Dept: URBAN - NONMETROPOLITAN AREA CLINIC 2 | Facility: CLINIC | Age: 74
End: 2024-10-30

## 2024-10-31 ENCOUNTER — WEB ENCOUNTER (OUTPATIENT)
Dept: URBAN - NONMETROPOLITAN AREA CLINIC 2 | Facility: CLINIC | Age: 74
End: 2024-10-31

## 2024-11-01 ENCOUNTER — WEB ENCOUNTER (OUTPATIENT)
Dept: URBAN - NONMETROPOLITAN AREA CLINIC 2 | Facility: CLINIC | Age: 74
End: 2024-11-01

## 2024-11-04 ENCOUNTER — WEB ENCOUNTER (OUTPATIENT)
Dept: URBAN - NONMETROPOLITAN AREA CLINIC 2 | Facility: CLINIC | Age: 74
End: 2024-11-04

## 2024-11-05 ENCOUNTER — WEB ENCOUNTER (OUTPATIENT)
Dept: URBAN - NONMETROPOLITAN AREA CLINIC 2 | Facility: CLINIC | Age: 74
End: 2024-11-05

## 2024-11-05 RX ORDER — PREDNISONE 10 MG/1
1 TABLET TABLET ORAL ONCE A DAY
Qty: 30 | Refills: 11
Start: 2024-10-17 | End: 2025-10-31

## 2024-11-13 ENCOUNTER — WEB ENCOUNTER (OUTPATIENT)
Dept: URBAN - NONMETROPOLITAN AREA CLINIC 2 | Facility: CLINIC | Age: 74
End: 2024-11-13

## 2024-11-20 ENCOUNTER — OFFICE VISIT (OUTPATIENT)
Dept: URBAN - METROPOLITAN AREA TELEHEALTH 2 | Facility: TELEHEALTH | Age: 74
End: 2024-11-20
Payer: COMMERCIAL

## 2024-11-20 DIAGNOSIS — R19.7 DIARRHEA, UNSPECIFIED TYPE: ICD-10-CM

## 2024-11-20 DIAGNOSIS — K83.01 PSC (PRIMARY SCLEROSING CHOLANGITIS): ICD-10-CM

## 2024-11-20 DIAGNOSIS — R76.12 (QFT) QUANTIFERON-TB TEST REACTION WITHOUT ACTIVE TUBERCULOSIS: ICD-10-CM

## 2024-11-20 DIAGNOSIS — K21.9 GASTROESOPHAGEAL REFLUX DISEASE, UNSPECIFIED WHETHER ESOPHAGITIS PRESENT: ICD-10-CM

## 2024-11-20 DIAGNOSIS — K86.2 PANCREATIC CYST: ICD-10-CM

## 2024-11-20 DIAGNOSIS — R74.8 ELEVATED LIVER ENZYMES: ICD-10-CM

## 2024-11-20 DIAGNOSIS — K51.018 ULCERATIVE PANCOLITIS WITH OTHER COMPLICATION: ICD-10-CM

## 2024-11-20 DIAGNOSIS — I82.409 DEEP VEIN THROMBOSIS (DVT) OF LOWER EXTREMITY, UNSPECIFIED CHRONICITY, UNSPECIFIED LATERALITY, UNSPECIFIED VEIN: ICD-10-CM

## 2024-11-20 DIAGNOSIS — A04.72 CLOSTRIDIUM DIFFICILE DIARRHEA: ICD-10-CM

## 2024-11-20 DIAGNOSIS — Z12.11 COLON CANCER SCREENING: ICD-10-CM

## 2024-11-20 DIAGNOSIS — R63.4 WEIGHT LOSS: ICD-10-CM

## 2024-11-20 PROBLEM — 89362005: Status: ACTIVE | Noted: 2024-11-20

## 2024-11-20 PROCEDURE — 992 APS NON BILLABLE: Performed by: NURSE PRACTITIONER

## 2024-11-20 RX ORDER — BUDESONIDE 3 MG/1
TAKE THREE CAPSULES BY MOUTH EVERY DAY CAPSULE, COATED PELLETS ORAL
Qty: 270 CAPSULE | Refills: 3 | Status: ACTIVE | COMMUNITY

## 2024-11-20 RX ORDER — PREDNISONE 10 MG/1
1 TABLET TABLET ORAL ONCE A DAY
Qty: 30 | Refills: 11 | Status: ACTIVE | COMMUNITY
Start: 2024-10-17 | End: 2025-10-31

## 2024-11-20 RX ORDER — PREDNISONE 1 MG/1
4 TABLETS DAILY TABLET ORAL ONCE A DAY
Qty: 360 TABLET | Refills: 1 | Status: ACTIVE | COMMUNITY
Start: 2024-08-07 | End: 2025-02-02

## 2024-11-20 RX ORDER — FAMOTIDINE 40 MG/1
TAKE 1 TABLET BY MOUTH TWICE A DAY TABLET, FILM COATED ORAL TWICE A DAY
Qty: 180 TABLET | Refills: 3 | Status: ACTIVE | COMMUNITY

## 2024-11-20 RX ORDER — MIRIKIZUMAB-MRKZ 100 MG/ML
AS DIRECTED INJECTION, SOLUTION SUBCUTANEOUS
Qty: 2 UNSPECIFIED | Refills: 11 | Status: ACTIVE | COMMUNITY
Start: 2024-02-07 | End: 2025-02-05

## 2024-11-20 RX ORDER — MESALAMINE 800 MG/1
TAKE 1 TABLET BY MOUTH 3 TIMES A DAY TABLET, DELAYED RELEASE ORAL
Qty: 90 TABLET | Refills: 3 | Status: ACTIVE | COMMUNITY

## 2024-11-20 RX ORDER — PREDNISONE 5 MG/1
TAKE 1 TABLET BY MOUTH EVERY DAY TABLET ORAL
Qty: 30 TABLET | Refills: 3 | Status: ACTIVE | COMMUNITY

## 2024-11-20 RX ORDER — METRONIDAZOLE 500 MG/1
1 TABLET TABLET ORAL THREE TIMES A DAY
Qty: 30 TABLETS | Refills: 0 | Status: ON HOLD | COMMUNITY

## 2024-11-20 RX ORDER — COLESTIPOL HYDROCHLORIDE 1 G/1
1 TABLET TABLET, FILM COATED ORAL ONCE A DAY
Qty: 90 TABLET | Refills: 3 | Status: ACTIVE | COMMUNITY
Start: 2024-10-17

## 2024-11-20 RX ORDER — PROPRANOLOL HYDROCHLORIDE 40 MG/1
AS DIRECTED TABLET ORAL
Status: ACTIVE | COMMUNITY

## 2024-11-20 RX ORDER — COLESTIPOL HYDROCHLORIDE 1 G/1
TAKE 1 TABLET BY MOUTH TWICE A DAY TABLET, FILM COATED ORAL
Qty: 180 TABLET | Refills: 3 | Status: ACTIVE | COMMUNITY

## 2024-11-20 RX ORDER — PREDNISONE 1 MG/1
1 TABLET TABLET ORAL EVERY OTHER DAY
Qty: 90 TABLETS | Refills: 3 | Status: ACTIVE | COMMUNITY
Start: 2023-05-22 | End: 2025-03-01

## 2024-11-20 NOTE — HPI-TODAY'S VISIT:
Mr. Pavon is a 71-year-old male who is referred by Dr. Justin Hinson for consultation of ulcerative colitis, PSC, and diarrhea.  A copy of this note will be sent to the referring physician.  He states all of his issues began in January 2020.  He developed a viral upper respiratory disease which was likely Covid at the time but not diagnosed.  He has had a downhill drop of his health since.  Over the course of the following months he develops gastrointestinal distress.  He underwent cholecystectomy at [place] in August 2020 with no relief.  He then had a syncopal episode later that fall and was found to be profoundly anemic.  He had an EGD and colonoscopy by Dr. Becerril and was diagnosed with ulcerative colitis in November 2020.  He was treated with steroids, mesalamine, and Humira.  He has been on and off of prednisone since.  He states so long as he is on prednisone he has 1-3 formed bowel movements daily, however anytime he comes off he goes up to 30 bowel movements daily.  His last flare started 6 to 8 weeks ago, he called Dr. Becerril's office and was placed on 20 mg of prednisone with a slow taper.  He is down to 10 mg daily and having 3-4 urgent bowel movements with some incontinence.  He does report mucus but no bleeding, he does occasionally have nocturnal diarrhea.  This spring he continued to have elevated liver enzymes, he underwent an evaluation for questionable IPMN versus PSC.  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  He was placed on azathioprine along with ursodiol.  Since this summer he continues to struggle with his GI complaints.  He is on Florastor daily.  He is taking mesalamine 2 capsules 3 times daily.  He is on Humira every other week.  He has seen no improvement in his symptoms since starting azathioprine this spring.  Unfortunately we have none of Dr. Becerril's records including his EGD, colonoscopy, pathology, liver biopsy, or imaging.  He is also undergoing neurologic work-up for tremor, however looking back this all seemed to start post viral, and has worsened with steroids.  His blood sugars have been labile along with his blood pressure.  Today he is not doing well and is here for a second opinion.  MB   1/11/2022 Bony presents for follow-up of ulcerative colitis, questionable history of primary sclerosing cholangitis, and C. difficile. Since his last visit he underwent colonoscopy in September by Dr. Willis with moderate pancolitis. He was transitioned off Humira and started on steroids and Inflectra. After receiving 2 of his induction doses he developed progressive diarrhea. He was tested and found to be C. difficile positive. He completed a course of vancomycin with recurrent symptoms and treated twice. He underwent his third infusion with a flare of severe diarrhea in the third week in December. He was admitted and found to be toxin negative but antigen and PCR positive. He underwent flexible sigmoidoscopy with no significant pseudomembranous colitis. His steroids were discontinued and he was placed on a vancomycin taper by Dr. Carrillo. Today his diarrhea is slowing down. Yesterday he states he had around 15 loose bowel movements. Today he has only had approximately 6. He is not taking Florastor. He has been treated with colestipol in the past but is not taking this recently. I was able to review his liver biopsy ordered by Dr. Becerril, it is unclear that he has PSC from his liver biopsy. He does agree to a second opinion with hepatology at [facility]. 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 He does agree to pursue Dificid plus or minus monoclonal antibody infusion if he has recurrence. He does agree to see infectious disease. We had a long discussion regarding dehydration. He does agree to fluids and repeat blood work this week. He understands that if he develops severe diarrhea abdominal cramping fever or distention that he is to proceed back to the emergency department. MB 1/25/2022 The patient presents today for follow-up of his ulcerative colitis and C. difficile with severe diarrhea.  Since our last visit, he has not been doing well.  He has been readmitted to the hospital secondary to diarrhea.  He was found to have severe C. difficile colitis.  He is having 10-20 bowel movements daily.  He denies any blood in his stool.  He denies any abdominal pain.  On flexible sigmoidoscopy, this was felt to be secondary to C. difficile.  He is tapering down his vancomycin, and he is taking colestipol twice a day.  We have discussed starting on cholestyramine 4 times a day and changing him to Dificid.  He does have a follow-up with infectious disease.  We are also going to check his labs today to make sure he is not dehydrated, and to make sure he does not need to be admitted for IV fluids.  At this point, we have discussed advancing his diet as tolerated.  Increasing his cholestyramine and adding Dificid.  Until the Dificid is improved, I do want him to take the vancomycin 4 times a day.  I have discussed this plan with the patient and his wife, and they are in agreement.  3/2/2022 Bony presents for follow up of UC and c. diff. SInce his last visit he did see ID. He never went on dificid and never had the monoclonal antibody as his repeat c. diff was negative, he completed the vanc taper and had the monoclonal ab infusion. He is doing better during the day, still loose urgent stools after meals. However at night he is having a BM every 45 minutes to an hour with tensmus. His last dose of inflectra 12/21 at 5mg/kg, that completed his induction. He is currently taking orally mesalamine 800mg TID. He is not taking the cholestyramine. His diarrhea at night is the worse. He doesn't feel like this c. diff diarrhea. Today he is still struggling with diarrhea at night and is due for infliximab. MB  This televisit was performed at the patient's home. 3/30/2022 Bony presents for follow-up of ulcerative colitis, C. difficile, reflux, diarrhea.  Since his last visit he continues to slowly improve.  He did follow-up with Dr. Landau with no indication of monoclonal antibody and repeat negative stool for C. difficile.  Currently he got his second maintenance dose of infliximab last week.  He is down to 4-5 loose bowel movements daily.  He is struggling with tenesmus but this is slowly improving.  He is almost done with his 8-week course of budesonide.  He is on cholestyramine twice daily, Florastor twice daily, and mesalamine 800 p.o. 3 times daily.  His reflux has been flaring since being off pantoprazole.  He does have some odynophagia.  I am concerned he may have a component of Candida.  He agrees to empiric treatment of Diflucan for 14 days.  His last EGD was in 2020 with reflux and gastritis by Dr. Becerril.  He is still awaiting his appointment with hepatology.  He is off azathioprine and Miles for now.  He is due for repeat blood work today.  I do want him to keep a stool study at home in case he has further symptoms of C. difficile as he lives an hour away.  We will see each other again after his next maintenance dose infusion and continue to monitor his symptoms closely.  MB   5/4/2022 Bony presents for follow up of UC, C. diff, reflux and diarrhea. He states last week he states he was feeling well. He was have a soft BM a few times a day. 6 days ago he developed acute urgent and watery diarrhea with a BM 20-30 times daily. He says he has incredible brain fog and incredible fatigue. He slept for 26 hours. He is up all night using the restroom. He is due for his next infliximab on 5/15. He denies any abdominal pain. He is very confused and is halucinating. Today he is very weak, he is down to 176lb. He is eating but not able to keep much down in regard to his diarrhea. MB 6/22/2022 Bony presents for follow-up of ulcerative colitis.  Since his last visit his repeat drug titers show elevated serum antibody and 0 serum drug level.  We increased his infliximab dose to 10 mg/kg and have scheduled this every 6 weeks.  He received a dose on May 25 and had also started a prednisone taper which she is finishing up this week down to 5 mg.  His flexible sigmoidoscopy in June shows moderate left-sided disease, his biopsies actually showed mildly active disease.  Today we have discussed his work-up.  He agrees to repeat his infliximab titers at trough to see if he has any further antibody development and to see if his serum drug level is appropriate at the higher dose.  If not he agrees to consider via Zaria Jules.  If so he agrees to give the infliximab a few more doses to see if things improve.  He can continues the mesalamine 3 times daily, the Florastor twice daily, and the cholestyramine twice daily.  MB 9/2/2022 Bnoy presents for follow-up of ulcerative colitis.  Since his last visit his repeat infliximab titers done June 29 at trough shows a low titer of antibody development and a low serum drug level even despite 10 mg/kg every 6-week dosing.  He did receive 2 doses at the high dose at the increased interval.  His repeat flexible sigmoidoscopy in June reveals left-sided moderate disease.  We made the decision to transition to Entyvio.  Since his last visit he has gotten 1 dose of Entyvio.  He did have significant arthralgias after the infusion however he also stopped his prednisone taper 2 days prior to starting the Entyvio.  He contacted me earlier this week with up to 10 watery bowel movements again daily.  We restarted a prednisone taper and his diarrhea is better today.  His arthralgias are also better.  His blood sugars remain labile however he has been managing this with insulin.  Today we have discussed his plan.  We will plan to proceed with induction of Entyvio.  I will see him after induction and check his titers.  If he does not have appropriate serum drug level would consider every 4 week dosing depending on his sequela.  MB 9/20/2022 The patient presents today for follow-up of his ulcerative colitis.  Since her last visit, we switched him from infliximab to Entyvio.  He has gotten 2 doses of Entyvio.  After both of his doses, he developed significant arthralgias.  Also, over the past 5 days, he has been off prednisone.  He is having up to 20 loose watery bowel movements daily.  He feels extremely weak and lethargic.  On his last colonoscopy, he was found to have active colitis.  At this point, we have discussed several options.  We have discussed proceeding with a Yuan inhibitor with Rinvoq.  We are going to try to get this approved through his insurance.  In the meantime, I am going to give him another prednisone taper.  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  They could be a component of coming off prednisone.  He also feels that he has long COVID, and he is wondering if this is contributing to his symptoms as well.  Fortunately, his labs are fairly normal other than his creatinine has been mildly elevated.  Today, we are going to switch him from Entyvio to Rinvoq and proceed with prednisone taper.  We have discussed the other option would possibly be Stelara. 11/16/2022 Bony presents follow up of UC. He has not gotten the Rinvoq because he has not uploaded his medicare card to their system. He and his wife will perform this today. Per haja's discussion with fadia kapoor he will be approved once they recieve this information. He is down to 5mg of prednisone and doing fairly well. He is having 1-3 urgent BMs most days. He is fatigued. His blood sugars are still labile. Today he agrees to pursue uploading the required documentation to obtain assistance and let me know when he receives therapy. MB  This telehealth visit was provided at the patient's home. 2/8/2023 Bony presents for follow-up of ulcerative colitis.  Since his last visit he started Cristela Skyler December 2.  He started at 45 mg daily which she took for 8 weeks.  On February 3 he decreased down to 15 mg daily for his maintenance dosing.  He subsequently developed increased urgent loose watery diarrhea.  He was having up to 15 bowel movements typically through the night.  His stool studies are negative for C. difficile but positive fecal calprotectin over 500.  His colonoscopy earlier this year shows pancolitis, his flex sig in June shows moderate persistent disease despite infliximab.  He was transition to Entyvio which we discontinued in December for invoke.  Today he continues to complain of loose urgent bowel movements.  Earlier this week his wife messaged me with lower extremity swelling, given the concern for DVT on" we sent him to the emergency room with no evidence of DVT.  He denies any abdominal pain but does report a low-grade fever.  He is following with [facility] on a trial for a long haul COVID.  He is on prednisone 5 mg every other day.  He feels that the Rinvoq did help his ulcerative colitis symptoms, he was on a long prednisone taper during this induction as well.  Today he is still having 10-15 loose bowel movements daily.  He is on mesalamine 800 twice daily.  He is taking Imodium as well.  His symptoms are predominantly at night.  He does agree to repeat colonoscopy, and will consider Stelara induction plus or minus written RINVOQ dosing to 30 mg daily depending on his results.  MB  3/15/2023 Bony presents for [facility]. Since his last visit he started colestipol 1 gm twice daily and budesonide 9mg daily. He has also been going on [facility] Tuesdays and Fridays to get fluids. He states in the past 8 days he is feeling great. He is currently on 15mg of Rinvoq. He is down to 3 BMs daily with no bleeding or mucous. He is occasionally having loose stools which he relates to eating a high sugar meal. He is on prednisone 5mg every other day. He would like to try and wean off prednisone. He feels like the Rinvoq is really helping. He wants to continue Rinvoq for now. He wants to hold off on surgery referral. He agrees to hold off on fluids unless he is having 6-8 watery stools for 3 days in a row. Today he is doing better overall. MB  This telehealth visit was provided at the patient's home. 6/21/2023 Bony presents for follow-up of ulcerative colitis and possible history of PSC.  Today he is doing better than he has in years.  He is down to a soft bowel movement once or twice daily.  He is on mesalamine 800 mg 3 times daily, colestipol twice daily, budesonide 9 mg daily, and prednisone 2 mg every other day weaning down.  His blood sugars have stabilized.  He is struggling with severe fatigue which we have discussed is likely secondary to his prednisone weaning.  Today we have discussed weaning off of his prednisone and then weaning off the budesonide.  His colonoscopy in February shows moderate to severe pancolitis.  He was originally due for surveillance in September of this year, he would like to pursue repeat colonoscopy given significant improvement in his symptoms to evaluate the reflux effect.  He is due for repeat labs, he has a questionable history of PSC although his imaging and biopsy in the past have not suggested this.  He said he did see Dr. La.  We will repeat his labs along with MRI imaging as his alkaline phosphatase has bumped up a hair.  Today he is doing well otherwise with no new GI complaints.  MB This telehealth visit was provided at the patient's home. 1/31/2024 Bony presents for follow-up of ulcerative colitis, history of indeterminate QuantiFERON, history of C. difficile, and questionable history of PSC.  Since his last visit he has been lost to follow-up.  In July he developed acute heart failure and subsequent DVT.  He was placed on Xarelto.  Unfortunately he was discharged after this hospitalization and fell and broke his hip.  He is going through orthopedic surgery for his hip and continues to struggle with chronic pain.  He continues to struggle with weakness and shortness of breath and is recently been diagnosed with aortic valve stenosis and is being worked up by cardiology and pulmonology.  He has limited mobility.  He is on Rinvoq 15 mg daily, mesalamine 800 mg 3 times daily, budesonide 9 mg daily, and 1 mg of prednisone every other day.  When he stops the budesonide his diarrhea returns.  When he is on this regimen he has 1-2 soft bowel movements daily.  He did not have his repeat surveillance colonoscopy as previously ordered due to his multiple health conditions.  He feels that his colitis is actually under control and is hesitant to discontinue", we have discussed the significant risk of cardiovascular death related to Yuan inhibitors particularly in patients with cardiovascular risk factors.  He has been evaluated by palliative care and is undergoing intake today.  Today overall he feels fairly well on his current regimen.  I will review with Dr. Lilly and we will determine if we need to transition him off of Yuan inhibitors given his part cardiovascular risk factors.  He is not a candidate for sedation at this point given his aortic stenosis, shortness of breath, and worsening cardiovascular and pulmonary condition.  Will discuss at his follow-up depending on his clinical course.  MB 3/6/2024 Bony presents for follow-up of ulcerative colitis.  Since his last visit we had a long discussion regarding his cardiovascular risks.  He is scheduled for hip surgery in 3 weeks.  I do not want him on written Skyler during or after the surgery.  He does have a high cardiovascular risk profile.  He and his wife agree to discontinue them both once he is approved for Omvoh.  We will plan for his first infusion to be done preop to his hip replacement and then his second infusion to be postop.  She does not like we can push out the hip replacement surgery.  Today they agree to pursue Omvoh.  MB 5/1/2024 Bony presents for follow-up of ulcerative colitis.  Since his last visit he discontinued her invoke and started on though 3 weeks ago status post his first induction infusion.  He is doing well postoperatively.  He is back up to prednisone 5 mg daily, he is on budesonide 9 mg daily, colestipol twice daily, and 4 mesalamine daily.  He is having 1-2 soft bowel movements daily.  He would like to start weaning off some of his medications.  We will wait until he gets through induction dosing before we try and wean his prednisone, in the meantime he can try and wean off the mesalamine first.  He had 2 indeterminate TB test last year, he has still not had his PPD, he has had multiple chest chest x-rays.  He did not follow-up with infectious disease as ordered.  He does understand the risk of tuberculosis infection on biologic therapy.  Today we will repeat his QuantiFERON and PPD.  We will follow-up pending his results.  He is actually doing well and in much less pain.  MB 8/7/2024 Bony presents for follow-up of ulcerative colitis.  Since his last visit he has been doing well on Omvoh monthly.  He has actually been constipated.  He is still on colestipol twice daily and 1 mesalamine along with 5 mg of prednisone.  He has discontinued budesonide.  Today he agrees to stop the mesalamine, we will drop his colestipol to 1 daily and then try and wean off.  He would like to try and wean down on his prednisone, we have discussed a very long taper weaning by 1 mg monthly.  He is due for his colonoscopy next year.  He is on famotidine twice daily.  He has had no further flares of diarrhea.  He is still not had his repeat QuantiFERON and PPD.  We have discussed the importance and the risk of TB on biologic therapy.  He agrees to have this done tomorrow.  Today he is doing well with no new GI complaints.  MB 10/17/2024 Bony presents for follow-up of ulcerative colitis, elevated liver enzymes, questionable history of PSC, weight loss, and failure to thrive.  Since his last visit his symptoms overall have taken a turn.  Mid-September he developed a decline in his health with severe fatigue.  His liver enzymes did increase, his repeat numbers show an secondary increase with positive CHARLI, his IgG is normal.  He has been compliant with his Omvoh every 4 weeks.  We pulled him off his budesonide, off the colestipol, off the mesalamine, and dropped his prednisone from 5 mg to 4 mg daily.  Since then he has had severe fatigue dizziness and a profound weight loss of over 10 pounds.  His CT scan showed slightly enlarged pancreatic cyst and fatty deposition of the liver but no acute process.  He has had some increased loose stools.  He denies any rectal bleeding tenesmus or mucus.  Today he agrees to increase his prednisone back to 5 mg daily, will restart at 1 colestipol daily.  He does not want to hold his umbo as discussed, he agrees to take his dose in late October and repeat labs 1 week later.  He does have an indeterminate TB test and will get his PPD from home health care with [facility].  His liver enzymes have all normalized except for his alkaline phosphatase on labs done this Sunday.  He is working with physical therapy.  I am concerned his deconditioning may be related to his prednisone dose decrease versus a viral etiology with a spike in his liver enzymes.  We will follow-up pending his clinical course.  MB 11/20/2024 Bony presents for follow-up.  Since his last visit his diarrhea flared, his stool studies were negative for infection but his lactoferrin was elevated.  In reviewing his medicines he actually was not taking his prednisone or his colestipol consistently.  After restarting the prednisone and colestipol twice daily his diarrhea has improved to 2-3 soft bowel movements daily.  He continues to struggle with failure to thrive, he has very little appetite.  He has more Parkinson's-like symptoms.  He has recently been evaluated by hospice.  He does report some back pain.  He continues to lose weight.  He did continue the on diet.  His repeat liver enzymes did trend down in October.  Overall his main GI complaints are lack of appetite and weight loss.  We have discussed changes on his CT scan specifically the pancreatic cysts.  We have discussed repeating contrasted imaging given his profound weight loss down from the 190s this summer down to 160 today.  For now he and his wife want to hold off on any further evaluation but will consider further workup of his back pain or his weight loss progresses and they do not decide to pursue hospice.  MB

## 2024-11-21 ENCOUNTER — OFFICE VISIT (OUTPATIENT)
Dept: URBAN - NONMETROPOLITAN AREA CLINIC 2 | Facility: CLINIC | Age: 74
End: 2024-11-21

## 2025-01-10 ENCOUNTER — WEB ENCOUNTER (OUTPATIENT)
Dept: URBAN - NONMETROPOLITAN AREA CLINIC 2 | Facility: CLINIC | Age: 75
End: 2025-01-10

## 2025-01-16 ENCOUNTER — OFFICE VISIT (OUTPATIENT)
Dept: URBAN - NONMETROPOLITAN AREA CLINIC 2 | Facility: CLINIC | Age: 75
End: 2025-01-16

## 2025-02-05 ENCOUNTER — OFFICE VISIT (OUTPATIENT)
Dept: URBAN - METROPOLITAN AREA TELEHEALTH 2 | Facility: TELEHEALTH | Age: 75
End: 2025-02-05